# Patient Record
Sex: MALE | Race: BLACK OR AFRICAN AMERICAN | Employment: OTHER
[De-identification: names, ages, dates, MRNs, and addresses within clinical notes are randomized per-mention and may not be internally consistent; named-entity substitution may affect disease eponyms.]

---

## 2017-01-11 ENCOUNTER — OFFICE VISIT (OUTPATIENT)
Dept: INTERNAL MEDICINE | Facility: CLINIC | Age: 62
End: 2017-01-11

## 2017-01-11 VITALS
OXYGEN SATURATION: 99 % | SYSTOLIC BLOOD PRESSURE: 133 MMHG | HEIGHT: 70 IN | BODY MASS INDEX: 25.97 KG/M2 | WEIGHT: 181.4 LBS | DIASTOLIC BLOOD PRESSURE: 73 MMHG | RESPIRATION RATE: 12 BRPM | HEART RATE: 87 BPM

## 2017-01-11 DIAGNOSIS — I10 ESSENTIAL HYPERTENSION: ICD-10-CM

## 2017-01-11 DIAGNOSIS — I63.9 OCCIPITAL CEREBRAL INFARCTION (HCC): ICD-10-CM

## 2017-01-11 DIAGNOSIS — H54.2X22 VISUAL IMPAIRMENT SEVERE BILATERALLY: ICD-10-CM

## 2017-01-11 DIAGNOSIS — K92.0 GASTROINTESTINAL HEMORRHAGE WITH HEMATEMESIS: ICD-10-CM

## 2017-01-11 PROCEDURE — 99214 OFFICE O/P EST MOD 30 MIN: CPT | Performed by: INTERNAL MEDICINE

## 2017-01-11 ASSESSMENT — ENCOUNTER SYMPTOMS: BLURRED VISION: 1

## 2017-01-11 ASSESSMENT — PATIENT HEALTH QUESTIONNAIRE - PHQ9
2. FEELING DOWN, DEPRESSED OR HOPELESS: 1
1. LITTLE INTEREST OR PLEASURE IN DOING THINGS: 1
SUM OF ALL RESPONSES TO PHQ9 QUESTIONS 1 & 2: 2
SUM OF ALL RESPONSES TO PHQ QUESTIONS 1-9: 2

## 2017-01-14 PROBLEM — R94.31 PROLONGED Q-T INTERVAL ON ECG: Status: ACTIVE | Noted: 2017-01-14

## 2017-01-24 ENCOUNTER — TELEPHONE (OUTPATIENT)
Dept: INTERNAL MEDICINE | Facility: CLINIC | Age: 62
End: 2017-01-24

## 2017-02-03 ENCOUNTER — OFFICE VISIT (OUTPATIENT)
Dept: INTERNAL MEDICINE | Facility: CLINIC | Age: 62
End: 2017-02-03

## 2017-02-03 VITALS
HEART RATE: 84 BPM | BODY MASS INDEX: 25.8 KG/M2 | OXYGEN SATURATION: 94 % | RESPIRATION RATE: 12 BRPM | DIASTOLIC BLOOD PRESSURE: 63 MMHG | SYSTOLIC BLOOD PRESSURE: 106 MMHG | WEIGHT: 179.8 LBS

## 2017-02-03 DIAGNOSIS — K92.2 GASTROINTESTINAL HEMORRHAGE, UNSPECIFIED GASTROINTESTINAL HEMORRHAGE TYPE: Primary | ICD-10-CM

## 2017-02-03 DIAGNOSIS — D62 ACUTE BLOOD LOSS ANEMIA: ICD-10-CM

## 2017-02-03 PROCEDURE — G8484 FLU IMMUNIZE NO ADMIN: HCPCS | Performed by: INTERNAL MEDICINE

## 2017-02-03 PROCEDURE — G8598 ASA/ANTIPLAT THER USED: HCPCS | Performed by: INTERNAL MEDICINE

## 2017-02-03 PROCEDURE — G8427 DOCREV CUR MEDS BY ELIG CLIN: HCPCS | Performed by: INTERNAL MEDICINE

## 2017-02-03 PROCEDURE — 1111F DSCHRG MED/CURRENT MED MERGE: CPT | Performed by: INTERNAL MEDICINE

## 2017-02-03 PROCEDURE — 99214 OFFICE O/P EST MOD 30 MIN: CPT | Performed by: INTERNAL MEDICINE

## 2017-02-03 PROCEDURE — 4004F PT TOBACCO SCREEN RCVD TLK: CPT | Performed by: INTERNAL MEDICINE

## 2017-02-03 PROCEDURE — G8419 CALC BMI OUT NRM PARAM NOF/U: HCPCS | Performed by: INTERNAL MEDICINE

## 2017-02-03 PROCEDURE — 3017F COLORECTAL CA SCREEN DOC REV: CPT | Performed by: INTERNAL MEDICINE

## 2017-02-03 ASSESSMENT — PATIENT HEALTH QUESTIONNAIRE - PHQ9
SUM OF ALL RESPONSES TO PHQ9 QUESTIONS 1 & 2: 0
SUM OF ALL RESPONSES TO PHQ QUESTIONS 1-9: 0
2. FEELING DOWN, DEPRESSED OR HOPELESS: 0
1. LITTLE INTEREST OR PLEASURE IN DOING THINGS: 0

## 2017-02-03 ASSESSMENT — ENCOUNTER SYMPTOMS
ABDOMINAL PAIN: 0
CHOKING: 1

## 2017-02-06 ENCOUNTER — TELEPHONE (OUTPATIENT)
Dept: INTERNAL MEDICINE | Facility: CLINIC | Age: 62
End: 2017-02-06

## 2017-02-06 DIAGNOSIS — D50.0 BLOOD LOSS ANEMIA: Primary | ICD-10-CM

## 2017-02-14 ENCOUNTER — HOSPITAL ENCOUNTER (OUTPATIENT)
Age: 62
Setting detail: SPECIMEN
Discharge: HOME | End: 2017-02-14
Payer: COMMERCIAL

## 2017-02-14 LAB
HCT VFR BLD CALC: 24.8 % (ref 41–53)
HEMOGLOBIN: 7.5 G/DL (ref 13.5–17.5)
MCH RBC QN AUTO: 28.1 PG (ref 26–34)
MCHC RBC AUTO-ENTMCNC: 30.3 G/DL (ref 31–37)
MCV RBC AUTO: 92.8 FL (ref 80–100)
PDW BLD-RTO: 17.3 % (ref 12.5–15.4)
PLATELET # BLD: 316 K/UL (ref 140–450)
PMV BLD AUTO: 8.8 FL (ref 6–12)
RBC # BLD: 2.68 M/UL (ref 4.5–5.9)
WBC # BLD: 6.4 K/UL (ref 3.5–11)

## 2017-02-21 ENCOUNTER — HOSPITAL ENCOUNTER (OUTPATIENT)
Age: 62
Setting detail: SPECIMEN
Discharge: HOME | End: 2017-02-21
Payer: COMMERCIAL

## 2017-02-22 LAB
HCT VFR BLD CALC: 28 % (ref 41–53)
HEMOGLOBIN: 8.4 G/DL (ref 13.5–17.5)
MCH RBC QN AUTO: 27.6 PG (ref 26–34)
MCHC RBC AUTO-ENTMCNC: 30.1 G/DL (ref 31–37)
MCV RBC AUTO: 91.8 FL (ref 80–100)
PDW BLD-RTO: 18.1 % (ref 12.5–15.4)
PLATELET # BLD: 357 K/UL (ref 140–450)
PMV BLD AUTO: 8.6 FL (ref 6–12)
RBC # BLD: 3.05 M/UL (ref 4.5–5.9)
WBC # BLD: 5.9 K/UL (ref 3.5–11)

## 2017-03-02 ENCOUNTER — HOSPITAL ENCOUNTER (OUTPATIENT)
Age: 62
Setting detail: SPECIMEN
Discharge: HOME | End: 2017-03-02
Payer: COMMERCIAL

## 2017-03-02 LAB
HCT VFR BLD CALC: 32.4 % (ref 41–53)
HEMOGLOBIN: 9.9 G/DL (ref 13.5–17.5)
MCH RBC QN AUTO: 27.1 PG (ref 26–34)
MCHC RBC AUTO-ENTMCNC: 30.7 G/DL (ref 31–37)
MCV RBC AUTO: 88.1 FL (ref 80–100)
PDW BLD-RTO: 16.4 % (ref 12.5–15.4)
PLATELET # BLD: 308 K/UL (ref 140–450)
PMV BLD AUTO: 8.8 FL (ref 6–12)
RBC # BLD: 3.68 M/UL (ref 4.5–5.9)
WBC # BLD: 5.1 K/UL (ref 3.5–11)

## 2017-03-07 ENCOUNTER — HOSPITAL ENCOUNTER (OUTPATIENT)
Age: 62
Setting detail: SPECIMEN
Discharge: HOME | End: 2017-03-07
Payer: COMMERCIAL

## 2017-03-07 LAB
HCT VFR BLD CALC: 33.5 % (ref 41–53)
HEMOGLOBIN: 10.1 G/DL (ref 13.5–17.5)
MCH RBC QN AUTO: 26.8 PG (ref 26–34)
MCHC RBC AUTO-ENTMCNC: 30.1 G/DL (ref 31–37)
MCV RBC AUTO: 88.8 FL (ref 80–100)
PDW BLD-RTO: 17.2 % (ref 12.5–15.4)
PLATELET # BLD: 309 K/UL (ref 140–450)
PMV BLD AUTO: 8.9 FL (ref 6–12)
RBC # BLD: 3.78 M/UL (ref 4.5–5.9)
WBC # BLD: 5.5 K/UL (ref 3.5–11)

## 2017-03-14 ENCOUNTER — HOSPITAL ENCOUNTER (OUTPATIENT)
Age: 62
Setting detail: SPECIMEN
Discharge: HOME | End: 2017-03-14
Payer: COMMERCIAL

## 2017-03-14 LAB
HCT VFR BLD CALC: 32 % (ref 41–53)
HEMOGLOBIN: 9.9 G/DL (ref 13.5–17.5)
MCH RBC QN AUTO: 27.3 PG (ref 26–34)
MCHC RBC AUTO-ENTMCNC: 30.8 G/DL (ref 31–37)
MCV RBC AUTO: 88.5 FL (ref 80–100)
PDW BLD-RTO: 18.6 % (ref 12.5–15.4)
PLATELET # BLD: 260 K/UL (ref 140–450)
PMV BLD AUTO: 9.3 FL (ref 6–12)
RBC # BLD: 3.61 M/UL (ref 4.5–5.9)
WBC # BLD: 8.5 K/UL (ref 3.5–11)

## 2017-03-14 RX ORDER — OMEPRAZOLE 40 MG/1
40 CAPSULE, DELAYED RELEASE ORAL DAILY
Qty: 60 CAPSULE | Refills: 0 | Status: SHIPPED | OUTPATIENT
Start: 2017-03-14 | End: 2017-04-05 | Stop reason: SDUPTHER

## 2017-03-14 RX ORDER — ONDANSETRON HYDROCHLORIDE 8 MG/1
8 TABLET, FILM COATED ORAL EVERY 8 HOURS PRN
COMMUNITY
End: 2017-03-14 | Stop reason: SDUPTHER

## 2017-03-14 RX ORDER — ATORVASTATIN CALCIUM 80 MG/1
TABLET, FILM COATED ORAL
Qty: 30 TABLET | Refills: 3 | Status: SHIPPED | OUTPATIENT
Start: 2017-03-14 | End: 2017-04-05 | Stop reason: SDUPTHER

## 2017-03-14 RX ORDER — ONDANSETRON HYDROCHLORIDE 8 MG/1
8 TABLET, FILM COATED ORAL EVERY 8 HOURS PRN
Qty: 90 TABLET | Refills: 2 | Status: SHIPPED | OUTPATIENT
Start: 2017-03-14 | End: 2017-04-05 | Stop reason: SDUPTHER

## 2017-03-24 ENCOUNTER — TELEPHONE (OUTPATIENT)
Dept: INTERNAL MEDICINE | Age: 62
End: 2017-03-24

## 2017-03-25 ENCOUNTER — HOSPITAL ENCOUNTER (OUTPATIENT)
Age: 62
Setting detail: SPECIMEN
Discharge: HOME | End: 2017-03-25
Payer: COMMERCIAL

## 2017-03-25 LAB
ABSOLUTE EOS #: 0.2 K/UL (ref 0–0.4)
ABSOLUTE LYMPH #: 1.06 K/UL (ref 1–4.8)
ABSOLUTE MONO #: 0.2 K/UL (ref 0.1–0.8)
BASOPHILS # BLD: 0 % (ref 0–2)
BASOPHILS ABSOLUTE: 0 K/UL (ref 0–0.2)
DIFFERENTIAL TYPE: ABNORMAL
EOSINOPHILS RELATIVE PERCENT: 3 % (ref 1–4)
HCT VFR BLD CALC: 23.3 % (ref 41–53)
HEMOGLOBIN: 7.3 G/DL (ref 13.5–17.5)
LYMPHOCYTES # BLD: 16 % (ref 24–44)
MCH RBC QN AUTO: 28.4 PG (ref 26–34)
MCHC RBC AUTO-ENTMCNC: 31.3 G/DL (ref 31–37)
MCV RBC AUTO: 90.8 FL (ref 80–100)
MONOCYTES # BLD: 3 % (ref 1–7)
MORPHOLOGY: ABNORMAL
PDW BLD-RTO: 22.1 % (ref 12.5–15.4)
PLATELET # BLD: 322 K/UL (ref 140–450)
PLATELET ESTIMATE: ABNORMAL
PMV BLD AUTO: 7.9 FL (ref 6–12)
RBC # BLD: 2.57 M/UL (ref 4.5–5.9)
RBC # BLD: ABNORMAL 10*6/UL
SEG NEUTROPHILS: 78 % (ref 36–66)
SEGMENTED NEUTROPHILS ABSOLUTE COUNT: 5.14 K/UL (ref 1.8–7.7)
WBC # BLD: 6.6 K/UL (ref 3.5–11)
WBC # BLD: ABNORMAL 10*3/UL

## 2017-03-29 ENCOUNTER — HOSPITAL ENCOUNTER (OUTPATIENT)
Age: 62
Setting detail: SPECIMEN
Discharge: HOME | End: 2017-03-29
Payer: COMMERCIAL

## 2017-03-29 LAB
HCT VFR BLD CALC: 25.7 % (ref 41–53)
HEMOGLOBIN: 7.9 G/DL (ref 13.5–17.5)
MCH RBC QN AUTO: 28.2 PG (ref 26–34)
MCHC RBC AUTO-ENTMCNC: 30.6 G/DL (ref 31–37)
MCV RBC AUTO: 92.1 FL (ref 80–100)
PDW BLD-RTO: 21.9 % (ref 12.5–15.4)
PLATELET # BLD: 358 K/UL (ref 140–450)
PMV BLD AUTO: 8.3 FL (ref 6–12)
RBC # BLD: 2.79 M/UL (ref 4.5–5.9)
WBC # BLD: 6.1 K/UL (ref 3.5–11)

## 2017-04-05 RX ORDER — ONDANSETRON HYDROCHLORIDE 8 MG/1
8 TABLET, FILM COATED ORAL EVERY 8 HOURS PRN
Qty: 90 TABLET | Refills: 3 | Status: SHIPPED | OUTPATIENT
Start: 2017-04-05 | End: 2019-01-23

## 2017-04-05 RX ORDER — ATORVASTATIN CALCIUM 80 MG/1
TABLET, FILM COATED ORAL
Qty: 30 TABLET | Refills: 3 | Status: SHIPPED | OUTPATIENT
Start: 2017-04-05 | End: 2017-08-31 | Stop reason: SDUPTHER

## 2017-04-05 RX ORDER — OMEPRAZOLE 40 MG/1
40 CAPSULE, DELAYED RELEASE ORAL DAILY
Qty: 60 CAPSULE | Refills: 3 | Status: ON HOLD | OUTPATIENT
Start: 2017-04-05 | End: 2017-12-23 | Stop reason: HOSPADM

## 2017-04-05 RX ORDER — PANTOPRAZOLE SODIUM 40 MG/1
40 TABLET, DELAYED RELEASE ORAL 2 TIMES DAILY
Qty: 30 TABLET | Refills: 3 | Status: ON HOLD | OUTPATIENT
Start: 2017-04-05 | End: 2017-12-23

## 2017-04-19 ENCOUNTER — HOSPITAL ENCOUNTER (OUTPATIENT)
Age: 62
Setting detail: SPECIMEN
Discharge: HOME | End: 2017-04-19
Payer: COMMERCIAL

## 2017-04-19 DIAGNOSIS — D62 ACUTE BLOOD LOSS ANEMIA: ICD-10-CM

## 2017-04-19 DIAGNOSIS — K92.2 GASTROINTESTINAL HEMORRHAGE, UNSPECIFIED GASTROINTESTINAL HEMORRHAGE TYPE: ICD-10-CM

## 2017-04-19 LAB
HCT VFR BLD CALC: 26.6 % (ref 41–53)
HEMOGLOBIN: 7.7 G/DL (ref 13.5–17.5)
MCH RBC QN AUTO: 26.5 PG (ref 26–34)
MCHC RBC AUTO-ENTMCNC: 29 G/DL (ref 31–37)
MCV RBC AUTO: 91.5 FL (ref 80–100)
PDW BLD-RTO: 18.9 % (ref 12.5–15.4)
PLATELET # BLD: 346 K/UL (ref 140–450)
PMV BLD AUTO: 9 FL (ref 6–12)
RBC # BLD: 2.9 M/UL (ref 4.5–5.9)
WBC # BLD: 5.2 K/UL (ref 3.5–11)

## 2017-04-20 ENCOUNTER — TELEPHONE (OUTPATIENT)
Dept: INTERNAL MEDICINE | Age: 62
End: 2017-04-20

## 2017-04-26 ENCOUNTER — HOSPITAL ENCOUNTER (OUTPATIENT)
Age: 62
Setting detail: SPECIMEN
Discharge: HOME | End: 2017-04-26
Payer: COMMERCIAL

## 2017-04-26 LAB
HCT VFR BLD CALC: 30.5 % (ref 41–53)
HEMOGLOBIN: 9 G/DL (ref 13.5–17.5)
MCH RBC QN AUTO: 26.4 PG (ref 26–34)
MCHC RBC AUTO-ENTMCNC: 29.4 G/DL (ref 31–37)
MCV RBC AUTO: 89.7 FL (ref 80–100)
PDW BLD-RTO: 17.8 % (ref 12.5–15.4)
PLATELET # BLD: 322 K/UL (ref 140–450)
PMV BLD AUTO: 9.1 FL (ref 6–12)
RBC # BLD: 3.4 M/UL (ref 4.5–5.9)
WBC # BLD: 6.2 K/UL (ref 3.5–11)

## 2017-05-03 ENCOUNTER — HOSPITAL ENCOUNTER (OUTPATIENT)
Age: 62
Setting detail: SPECIMEN
Discharge: HOME | End: 2017-05-03
Payer: COMMERCIAL

## 2017-05-03 LAB
HCT VFR BLD CALC: 35.3 % (ref 41–53)
HEMOGLOBIN: 10.5 G/DL (ref 13.5–17.5)
MCH RBC QN AUTO: 26.7 PG (ref 26–34)
MCHC RBC AUTO-ENTMCNC: 29.7 G/DL (ref 31–37)
MCV RBC AUTO: 90.1 FL (ref 80–100)
PDW BLD-RTO: 19.4 % (ref 12.5–15.4)
PLATELET # BLD: 279 K/UL (ref 140–450)
PMV BLD AUTO: 9.4 FL (ref 6–12)
RBC # BLD: 3.92 M/UL (ref 4.5–5.9)
WBC # BLD: 5.4 K/UL (ref 3.5–11)

## 2017-05-08 ENCOUNTER — APPOINTMENT (OUTPATIENT)
Dept: GENERAL RADIOLOGY | Age: 62
End: 2017-05-08
Payer: COMMERCIAL

## 2017-05-08 ENCOUNTER — HOSPITAL ENCOUNTER (EMERGENCY)
Age: 62
Discharge: HOME | End: 2017-05-08
Attending: EMERGENCY MEDICINE
Payer: COMMERCIAL

## 2017-05-08 VITALS
SYSTOLIC BLOOD PRESSURE: 132 MMHG | WEIGHT: 195.6 LBS | TEMPERATURE: 99.1 F | OXYGEN SATURATION: 99 % | BODY MASS INDEX: 28.97 KG/M2 | HEIGHT: 69 IN | HEART RATE: 88 BPM | RESPIRATION RATE: 16 BRPM | DIASTOLIC BLOOD PRESSURE: 73 MMHG

## 2017-05-08 DIAGNOSIS — R07.89 RIGHT-SIDED CHEST WALL PAIN: Primary | ICD-10-CM

## 2017-05-08 LAB
ABSOLUTE EOS #: 0.1 K/UL (ref 0–0.4)
ABSOLUTE LYMPH #: 1.1 K/UL (ref 1–4.8)
ABSOLUTE MONO #: 0.7 K/UL (ref 0.2–0.8)
ANION GAP SERPL CALCULATED.3IONS-SCNC: 12 MMOL/L (ref 9–17)
BASOPHILS # BLD: 0 %
BASOPHILS ABSOLUTE: 0 K/UL (ref 0–0.2)
BUN BLDV-MCNC: 12 MG/DL (ref 8–23)
BUN/CREAT BLD: 11 (ref 9–20)
CALCIUM SERPL-MCNC: 8.8 MG/DL (ref 8.6–10.4)
CHLORIDE BLD-SCNC: 102 MMOL/L (ref 98–107)
CO2: 27 MMOL/L (ref 20–31)
CREAT SERPL-MCNC: 1.13 MG/DL (ref 0.7–1.2)
D-DIMER QUANTITATIVE: 0.67 MG/L FEU
DIFFERENTIAL TYPE: ABNORMAL
EOSINOPHILS RELATIVE PERCENT: 2 %
GFR AFRICAN AMERICAN: >60 ML/MIN
GFR NON-AFRICAN AMERICAN: >60 ML/MIN
GFR SERPL CREATININE-BSD FRML MDRD: ABNORMAL ML/MIN/{1.73_M2}
GFR SERPL CREATININE-BSD FRML MDRD: ABNORMAL ML/MIN/{1.73_M2}
GLUCOSE BLD-MCNC: 104 MG/DL (ref 70–99)
HCT VFR BLD CALC: 34.7 % (ref 41–53)
HEMOGLOBIN: 10.7 G/DL (ref 13.5–17.5)
LYMPHOCYTES # BLD: 16 %
MCH RBC QN AUTO: 26.9 PG (ref 26–34)
MCHC RBC AUTO-ENTMCNC: 30.8 G/DL (ref 31–37)
MCV RBC AUTO: 87.5 FL (ref 80–100)
MONOCYTES # BLD: 10 %
PDW BLD-RTO: 18.7 % (ref 11.5–14.5)
PLATELET # BLD: 251 K/UL (ref 130–400)
PLATELET ESTIMATE: ABNORMAL
PMV BLD AUTO: 8.6 FL (ref 6–12)
POTASSIUM SERPL-SCNC: 4.1 MMOL/L (ref 3.7–5.3)
RBC # BLD: 3.97 M/UL (ref 4.5–5.9)
RBC # BLD: ABNORMAL 10*6/UL
SEG NEUTROPHILS: 72 %
SEGMENTED NEUTROPHILS ABSOLUTE COUNT: 5 K/UL (ref 1.8–7.7)
SODIUM BLD-SCNC: 141 MMOL/L (ref 135–144)
WBC # BLD: 7 K/UL (ref 3.5–11)
WBC # BLD: ABNORMAL 10*3/UL

## 2017-05-08 PROCEDURE — 71101 X-RAY EXAM UNILAT RIBS/CHEST: CPT

## 2017-05-08 PROCEDURE — 85379 FIBRIN DEGRADATION QUANT: CPT

## 2017-05-08 PROCEDURE — 36415 COLL VENOUS BLD VENIPUNCTURE: CPT

## 2017-05-08 PROCEDURE — 99283 EMERGENCY DEPT VISIT LOW MDM: CPT

## 2017-05-08 PROCEDURE — 80048 BASIC METABOLIC PNL TOTAL CA: CPT

## 2017-05-08 PROCEDURE — 85025 COMPLETE CBC W/AUTO DIFF WBC: CPT

## 2017-05-08 RX ORDER — HYDROCODONE BITARTRATE AND ACETAMINOPHEN 5; 325 MG/1; MG/1
1 TABLET ORAL EVERY 6 HOURS PRN
Qty: 20 TABLET | Refills: 0 | Status: SHIPPED | OUTPATIENT
Start: 2017-05-08 | End: 2017-05-15

## 2017-05-08 RX ORDER — CYCLOBENZAPRINE HCL 10 MG
10 TABLET ORAL 3 TIMES DAILY PRN
Qty: 30 TABLET | Refills: 0 | Status: SHIPPED | OUTPATIENT
Start: 2017-05-08 | End: 2017-05-18

## 2017-05-08 ASSESSMENT — PAIN SCALES - GENERAL: PAINLEVEL_OUTOF10: 10

## 2017-05-09 ASSESSMENT — ENCOUNTER SYMPTOMS
ABDOMINAL PAIN: 0
RHINORRHEA: 0
COUGH: 0
WHEEZING: 0
DIARRHEA: 0
SORE THROAT: 0
SINUS PRESSURE: 0
SHORTNESS OF BREATH: 0
COLOR CHANGE: 0
NAUSEA: 0
CONSTIPATION: 0
VOMITING: 0

## 2017-09-01 RX ORDER — ATORVASTATIN CALCIUM 80 MG/1
TABLET, FILM COATED ORAL
Qty: 30 TABLET | Refills: 3 | Status: ON HOLD | OUTPATIENT
Start: 2017-09-01 | End: 2017-12-23

## 2017-09-01 RX ORDER — FERROUS SULFATE 325(65) MG
325 TABLET ORAL DAILY
Qty: 30 TABLET | Refills: 3 | Status: ON HOLD | OUTPATIENT
Start: 2017-09-01 | End: 2017-12-23

## 2017-12-21 ENCOUNTER — HOSPITAL ENCOUNTER (INPATIENT)
Age: 62
LOS: 2 days | Discharge: HOME | DRG: 378 | End: 2017-12-23
Attending: EMERGENCY MEDICINE | Admitting: INTERNAL MEDICINE
Payer: COMMERCIAL

## 2017-12-21 ENCOUNTER — APPOINTMENT (OUTPATIENT)
Dept: GENERAL RADIOLOGY | Age: 62
DRG: 378 | End: 2017-12-21
Payer: COMMERCIAL

## 2017-12-21 ENCOUNTER — ANESTHESIA EVENT (OUTPATIENT)
Dept: OPERATING ROOM | Age: 62
DRG: 378 | End: 2017-12-21
Payer: COMMERCIAL

## 2017-12-21 DIAGNOSIS — K29.01 ACUTE GASTRITIS WITH HEMORRHAGE, UNSPECIFIED GASTRITIS TYPE: ICD-10-CM

## 2017-12-21 DIAGNOSIS — D64.9 ANEMIA, UNSPECIFIED TYPE: ICD-10-CM

## 2017-12-21 DIAGNOSIS — K25.4 GASTROINTESTINAL HEMORRHAGE ASSOCIATED WITH GASTRIC ULCER: ICD-10-CM

## 2017-12-21 DIAGNOSIS — K92.2 UPPER GI BLEED: Primary | ICD-10-CM

## 2017-12-21 LAB
ABSOLUTE EOS #: 0.1 K/UL (ref 0–0.4)
ABSOLUTE IMMATURE GRANULOCYTE: ABNORMAL K/UL (ref 0–0.3)
ABSOLUTE LYMPH #: 1 K/UL (ref 1–4.8)
ABSOLUTE MONO #: 0.9 K/UL (ref 0.2–0.8)
ACETAMINOPHEN LEVEL: <10 UG/ML (ref 10–30)
ALBUMIN SERPL-MCNC: 4.1 G/DL (ref 3.5–5.2)
ALBUMIN/GLOBULIN RATIO: NORMAL (ref 1–2.5)
ALP BLD-CCNC: 78 U/L (ref 40–129)
ALT SERPL-CCNC: 12 U/L (ref 5–41)
ANION GAP SERPL CALCULATED.3IONS-SCNC: 13 MMOL/L (ref 9–17)
AST SERPL-CCNC: 24 U/L
BASOPHILS # BLD: 0 % (ref 0–2)
BASOPHILS ABSOLUTE: 0 K/UL (ref 0–0.2)
BILIRUB SERPL-MCNC: 0.34 MG/DL (ref 0.3–1.2)
BILIRUBIN DIRECT: 0.1 MG/DL
BILIRUBIN, INDIRECT: 0.24 MG/DL (ref 0–1)
BUN BLDV-MCNC: 15 MG/DL (ref 8–23)
BUN/CREAT BLD: 10 (ref 9–20)
CALCIUM SERPL-MCNC: 8.6 MG/DL (ref 8.6–10.4)
CHLORIDE BLD-SCNC: 98 MMOL/L (ref 98–107)
CO2: 24 MMOL/L (ref 20–31)
CREAT SERPL-MCNC: 1.51 MG/DL (ref 0.7–1.2)
DIFFERENTIAL TYPE: ABNORMAL
EOSINOPHILS RELATIVE PERCENT: 1 % (ref 1–4)
ETHANOL PERCENT: <0.01 %
ETHANOL: <10 MG/DL
GFR AFRICAN AMERICAN: 57 ML/MIN
GFR NON-AFRICAN AMERICAN: 47 ML/MIN
GFR SERPL CREATININE-BSD FRML MDRD: ABNORMAL ML/MIN/{1.73_M2}
GFR SERPL CREATININE-BSD FRML MDRD: ABNORMAL ML/MIN/{1.73_M2}
GLOBULIN: NORMAL G/DL (ref 1.5–3.8)
GLUCOSE BLD-MCNC: 140 MG/DL (ref 70–99)
HCT VFR BLD CALC: 20.9 % (ref 41–53)
HEMOGLOBIN: 6.3 G/DL (ref 13.5–17.5)
HEMOGLOBIN: 7.4 G/DL (ref 13.5–17.5)
HEMOGLOBIN: 7.6 G/DL (ref 13.5–17.5)
IMMATURE GRANULOCYTES: ABNORMAL %
INR BLD: 1
LACTIC ACID: 1.3 MMOL/L (ref 0.5–2.2)
LIPASE: 15 U/L (ref 13–60)
LYMPHOCYTES # BLD: 10 % (ref 24–44)
MAGNESIUM: 2.3 MG/DL (ref 1.6–2.6)
MCH RBC QN AUTO: 25.9 PG (ref 26–34)
MCHC RBC AUTO-ENTMCNC: 30.2 G/DL (ref 31–37)
MCV RBC AUTO: 85.7 FL (ref 80–100)
MONOCYTES # BLD: 9 % (ref 1–7)
MORPHOLOGY: ABNORMAL
PARTIAL THROMBOPLASTIN TIME: 24 SEC (ref 23–31)
PDW BLD-RTO: 20.4 % (ref 11.5–14.5)
PLATELET # BLD: 399 K/UL (ref 130–400)
PLATELET ESTIMATE: ABNORMAL
PMV BLD AUTO: ABNORMAL FL (ref 6–12)
POTASSIUM SERPL-SCNC: 3.5 MMOL/L (ref 3.7–5.3)
PROTHROMBIN TIME: 10.6 SEC (ref 9.7–11.6)
RBC # BLD: 2.43 M/UL (ref 4.5–5.9)
RBC # BLD: ABNORMAL 10*6/UL
SALICYLATE LEVEL: <1 MG/DL (ref 3–10)
SEG NEUTROPHILS: 80 % (ref 36–66)
SEGMENTED NEUTROPHILS ABSOLUTE COUNT: 8 K/UL (ref 1.8–7.7)
SODIUM BLD-SCNC: 135 MMOL/L (ref 135–144)
TOTAL PROTEIN: 7.2 G/DL (ref 6.4–8.3)
TOXIC TRICYCLIC SC,BLOOD: NEGATIVE
WBC # BLD: 10 K/UL (ref 3.5–11)
WBC # BLD: ABNORMAL 10*3/UL

## 2017-12-21 PROCEDURE — 36430 TRANSFUSION BLD/BLD COMPNT: CPT

## 2017-12-21 PROCEDURE — 83690 ASSAY OF LIPASE: CPT

## 2017-12-21 PROCEDURE — 74022 RADEX COMPL AQT ABD SERIES: CPT

## 2017-12-21 PROCEDURE — 6360000002 HC RX W HCPCS: Performed by: NURSE PRACTITIONER

## 2017-12-21 PROCEDURE — 85018 HEMOGLOBIN: CPT

## 2017-12-21 PROCEDURE — 86901 BLOOD TYPING SEROLOGIC RH(D): CPT

## 2017-12-21 PROCEDURE — 86920 COMPATIBILITY TEST SPIN: CPT

## 2017-12-21 PROCEDURE — 2580000003 HC RX 258: Performed by: NURSE PRACTITIONER

## 2017-12-21 PROCEDURE — 1200000000 HC SEMI PRIVATE

## 2017-12-21 PROCEDURE — 6360000002 HC RX W HCPCS: Performed by: EMERGENCY MEDICINE

## 2017-12-21 PROCEDURE — 99285 EMERGENCY DEPT VISIT HI MDM: CPT

## 2017-12-21 PROCEDURE — 6370000000 HC RX 637 (ALT 250 FOR IP): Performed by: NURSE PRACTITIONER

## 2017-12-21 PROCEDURE — 85025 COMPLETE CBC W/AUTO DIFF WBC: CPT

## 2017-12-21 PROCEDURE — 36415 COLL VENOUS BLD VENIPUNCTURE: CPT

## 2017-12-21 PROCEDURE — 2580000003 HC RX 258: Performed by: INTERNAL MEDICINE

## 2017-12-21 PROCEDURE — 80307 DRUG TEST PRSMV CHEM ANLYZR: CPT

## 2017-12-21 PROCEDURE — 83735 ASSAY OF MAGNESIUM: CPT

## 2017-12-21 PROCEDURE — 83605 ASSAY OF LACTIC ACID: CPT

## 2017-12-21 PROCEDURE — 6370000000 HC RX 637 (ALT 250 FOR IP): Performed by: INTERNAL MEDICINE

## 2017-12-21 PROCEDURE — 99254 IP/OBS CNSLTJ NEW/EST MOD 60: CPT | Performed by: INTERNAL MEDICINE

## 2017-12-21 PROCEDURE — C9113 INJ PANTOPRAZOLE SODIUM, VIA: HCPCS | Performed by: NURSE PRACTITIONER

## 2017-12-21 PROCEDURE — 96375 TX/PRO/DX INJ NEW DRUG ADDON: CPT

## 2017-12-21 PROCEDURE — 85730 THROMBOPLASTIN TIME PARTIAL: CPT

## 2017-12-21 PROCEDURE — 85610 PROTHROMBIN TIME: CPT

## 2017-12-21 PROCEDURE — G0480 DRUG TEST DEF 1-7 CLASSES: HCPCS

## 2017-12-21 PROCEDURE — P9016 RBC LEUKOCYTES REDUCED: HCPCS

## 2017-12-21 PROCEDURE — 99222 1ST HOSP IP/OBS MODERATE 55: CPT | Performed by: INTERNAL MEDICINE

## 2017-12-21 PROCEDURE — C9113 INJ PANTOPRAZOLE SODIUM, VIA: HCPCS | Performed by: EMERGENCY MEDICINE

## 2017-12-21 PROCEDURE — 86850 RBC ANTIBODY SCREEN: CPT

## 2017-12-21 PROCEDURE — 80048 BASIC METABOLIC PNL TOTAL CA: CPT

## 2017-12-21 PROCEDURE — 86900 BLOOD TYPING SEROLOGIC ABO: CPT

## 2017-12-21 PROCEDURE — 2580000003 HC RX 258: Performed by: EMERGENCY MEDICINE

## 2017-12-21 PROCEDURE — 80076 HEPATIC FUNCTION PANEL: CPT

## 2017-12-21 PROCEDURE — 96365 THER/PROPH/DIAG IV INF INIT: CPT

## 2017-12-21 RX ORDER — ONDANSETRON 2 MG/ML
4 INJECTION INTRAMUSCULAR; INTRAVENOUS ONCE
Status: COMPLETED | OUTPATIENT
Start: 2017-12-21 | End: 2017-12-21

## 2017-12-21 RX ORDER — ACETAMINOPHEN 325 MG/1
650 TABLET ORAL EVERY 4 HOURS PRN
Status: DISCONTINUED | OUTPATIENT
Start: 2017-12-21 | End: 2017-12-23 | Stop reason: HOSPADM

## 2017-12-21 RX ORDER — SODIUM CHLORIDE 9 MG/ML
INJECTION, SOLUTION INTRAVENOUS CONTINUOUS
Status: DISCONTINUED | OUTPATIENT
Start: 2017-12-21 | End: 2017-12-23 | Stop reason: HOSPADM

## 2017-12-21 RX ORDER — SODIUM CHLORIDE 9 MG/ML
INJECTION, SOLUTION INTRAVENOUS CONTINUOUS
Status: CANCELLED | OUTPATIENT
Start: 2017-12-21

## 2017-12-21 RX ORDER — SODIUM CHLORIDE 0.9 % (FLUSH) 0.9 %
10 SYRINGE (ML) INJECTION EVERY 12 HOURS SCHEDULED
Status: CANCELLED | OUTPATIENT
Start: 2017-12-21

## 2017-12-21 RX ORDER — SODIUM CHLORIDE 0.9 % (FLUSH) 0.9 %
10 SYRINGE (ML) INJECTION EVERY 12 HOURS SCHEDULED
Status: DISCONTINUED | OUTPATIENT
Start: 2017-12-21 | End: 2017-12-23 | Stop reason: HOSPADM

## 2017-12-21 RX ORDER — 0.9 % SODIUM CHLORIDE 0.9 %
1000 INTRAVENOUS SOLUTION INTRAVENOUS ONCE
Status: COMPLETED | OUTPATIENT
Start: 2017-12-21 | End: 2017-12-21

## 2017-12-21 RX ORDER — SODIUM CHLORIDE 0.9 % (FLUSH) 0.9 %
10 SYRINGE (ML) INJECTION PRN
Status: DISCONTINUED | OUTPATIENT
Start: 2017-12-21 | End: 2017-12-23 | Stop reason: HOSPADM

## 2017-12-21 RX ORDER — CYANOCOBALAMIN 1000 UG/ML
1000 INJECTION INTRAMUSCULAR; SUBCUTANEOUS ONCE
Status: COMPLETED | OUTPATIENT
Start: 2017-12-21 | End: 2017-12-21

## 2017-12-21 RX ORDER — LANOLIN ALCOHOL/MO/W.PET/CERES
1000 CREAM (GRAM) TOPICAL DAILY
Status: DISCONTINUED | OUTPATIENT
Start: 2017-12-21 | End: 2017-12-23 | Stop reason: HOSPADM

## 2017-12-21 RX ORDER — MORPHINE SULFATE 4 MG/ML
4 INJECTION, SOLUTION INTRAMUSCULAR; INTRAVENOUS
Status: DISCONTINUED | OUTPATIENT
Start: 2017-12-21 | End: 2017-12-23 | Stop reason: HOSPADM

## 2017-12-21 RX ORDER — LIDOCAINE HYDROCHLORIDE 10 MG/ML
1 INJECTION, SOLUTION EPIDURAL; INFILTRATION; INTRACAUDAL; PERINEURAL
Status: CANCELLED | OUTPATIENT
Start: 2017-12-21 | End: 2017-12-21

## 2017-12-21 RX ORDER — ATORVASTATIN CALCIUM 80 MG/1
80 TABLET, FILM COATED ORAL DAILY
Status: DISCONTINUED | OUTPATIENT
Start: 2017-12-21 | End: 2017-12-23 | Stop reason: HOSPADM

## 2017-12-21 RX ORDER — SODIUM CHLORIDE 0.9 % (FLUSH) 0.9 %
10 SYRINGE (ML) INJECTION PRN
Status: CANCELLED | OUTPATIENT
Start: 2017-12-21

## 2017-12-21 RX ORDER — LANOLIN ALCOHOL/MO/W.PET/CERES
325 CREAM (GRAM) TOPICAL DAILY
Status: DISCONTINUED | OUTPATIENT
Start: 2017-12-21 | End: 2017-12-23 | Stop reason: HOSPADM

## 2017-12-21 RX ORDER — ONDANSETRON 2 MG/ML
4 INJECTION INTRAMUSCULAR; INTRAVENOUS EVERY 6 HOURS PRN
Status: DISCONTINUED | OUTPATIENT
Start: 2017-12-21 | End: 2017-12-23 | Stop reason: HOSPADM

## 2017-12-21 RX ORDER — MORPHINE SULFATE 2 MG/ML
2 INJECTION, SOLUTION INTRAMUSCULAR; INTRAVENOUS
Status: DISCONTINUED | OUTPATIENT
Start: 2017-12-21 | End: 2017-12-23 | Stop reason: HOSPADM

## 2017-12-21 RX ORDER — 0.9 % SODIUM CHLORIDE 0.9 %
250 INTRAVENOUS SOLUTION INTRAVENOUS ONCE
Status: COMPLETED | OUTPATIENT
Start: 2017-12-21 | End: 2017-12-21

## 2017-12-21 RX ORDER — SODIUM CHLORIDE, SODIUM LACTATE, POTASSIUM CHLORIDE, CALCIUM CHLORIDE 600; 310; 30; 20 MG/100ML; MG/100ML; MG/100ML; MG/100ML
INJECTION, SOLUTION INTRAVENOUS CONTINUOUS
Status: CANCELLED | OUTPATIENT
Start: 2017-12-21

## 2017-12-21 RX ADMIN — METOPROLOL TARTRATE 12.5 MG: 25 TABLET ORAL at 20:33

## 2017-12-21 RX ADMIN — POLYETHYLENE GLYCOL-3350 AND ELECTROLYTES 4000 ML: 236; 6.74; 5.86; 2.97; 22.74 POWDER, FOR SOLUTION ORAL at 15:26

## 2017-12-21 RX ADMIN — SODIUM CHLORIDE 80 MG: 9 INJECTION, SOLUTION INTRAVENOUS at 03:11

## 2017-12-21 RX ADMIN — METOPROLOL TARTRATE 12.5 MG: 25 TABLET ORAL at 08:23

## 2017-12-21 RX ADMIN — CYANOCOBALAMIN 1000 MCG: 1000 INJECTION, SOLUTION INTRAMUSCULAR at 04:00

## 2017-12-21 RX ADMIN — SODIUM CHLORIDE 250 ML: 9 INJECTION, SOLUTION INTRAVENOUS at 06:15

## 2017-12-21 RX ADMIN — MAGNESIUM OXIDE TAB 400 MG (241.3 MG ELEMENTAL MG) 400 MG: 400 (241.3 MG) TAB at 08:17

## 2017-12-21 RX ADMIN — FERROUS SULFATE TAB EC 325 MG (65 MG FE EQUIVALENT) 325 MG: 325 (65 FE) TABLET DELAYED RESPONSE at 15:26

## 2017-12-21 RX ADMIN — SODIUM CHLORIDE 8 MG/HR: 9 INJECTION, SOLUTION INTRAVENOUS at 03:58

## 2017-12-21 RX ADMIN — ATORVASTATIN CALCIUM 80 MG: 80 TABLET, FILM COATED ORAL at 08:17

## 2017-12-21 RX ADMIN — SODIUM CHLORIDE 1000 ML: 9 INJECTION, SOLUTION INTRAVENOUS at 03:11

## 2017-12-21 RX ADMIN — SODIUM CHLORIDE 8 MG/HR: 9 INJECTION, SOLUTION INTRAVENOUS at 19:26

## 2017-12-21 RX ADMIN — Medication 10 ML: at 08:16

## 2017-12-21 RX ADMIN — SODIUM CHLORIDE: 9 INJECTION, SOLUTION INTRAVENOUS at 20:12

## 2017-12-21 RX ADMIN — SODIUM CHLORIDE 8 MG/HR: 9 INJECTION, SOLUTION INTRAVENOUS at 07:00

## 2017-12-21 RX ADMIN — ONDANSETRON 4 MG: 2 INJECTION INTRAMUSCULAR; INTRAVENOUS at 03:10

## 2017-12-21 RX ADMIN — CYANOCOBALAMIN TAB 1000 MCG 1000 MCG: 1000 TAB at 08:17

## 2017-12-21 RX ADMIN — SODIUM CHLORIDE: 9 INJECTION, SOLUTION INTRAVENOUS at 08:13

## 2017-12-21 ASSESSMENT — ENCOUNTER SYMPTOMS
BLURRED VISION: 0
SORE THROAT: 0
SHORTNESS OF BREATH: 0
EYE DISCHARGE: 0
WHEEZING: 0
COUGH: 0
NAUSEA: 1
VOMITING: 1
DIARRHEA: 0
CONSTIPATION: 0
ABDOMINAL PAIN: 1
RESPIRATORY NEGATIVE: 1
BACK PAIN: 0

## 2017-12-21 NOTE — ED PROVIDER NOTES
 MI (myocardial infarction)     Poor historian        SURGICAL HISTORY           Procedure Laterality Date    CARDIAC SURGERY      COLON SURGERY Right 12/15/15    COLONOSCOPY  07/25/15    HYPERPLASTIC AND TUBULAR ADENOMA     COLONOSCOPY  12/15/15    SERRATED ADENOMA    COLONOSCOPY  03-10-16    Severe diverticulosis , adenomatous polyp    ENDOSCOPY, COLON, DIAGNOSTIC  2015    egd    ENDOSCOPY, COLON, DIAGNOSTIC  2016    EGD WITH BIOPYS    HEMICOLECTOMY Right 12/15/15    Dr. Andres Clark ENDOSCOPY  16    UPPER GASTROINTESTINAL ENDOSCOPY  11/13/15    NORMAL SMALL BOWEL MUCOSA    UPPER GASTROINTESTINAL ENDOSCOPY  3/8/16    Small gastric ulcer less than 1 cm in the body of the stomach, no visible vessel, no blood staining the stomach, we injected 5 cc of 1/80930 epi around it Duodenal avm, cauterized using bicab gold probe, not actively bleeding. those two lesion could cause bleeding but the fact that bile is very clean with no blood old or fresh make that less likely the source of bleeding          FAMILY HISTORY     History reviewed. No pertinent family history. Family Status   Relation Status    Mother Alive    Father         SOCIAL HISTORY      reports that he has been smoking Cigarettes. He has a 30.00 pack-year smoking history. He has never used smokeless tobacco. He reports that he drinks alcohol. He reports that he does not use drugs. REVIEW OF SYSTEMS    (2-9 systems for level 4, 10 or more for level 5)     Review of Systems   Constitutional: Positive for appetite change. HENT: Negative. Respiratory: Negative. Cardiovascular: Negative. Gastrointestinal: Positive for abdominal pain, nausea and vomiting. Genitourinary: Negative. Musculoskeletal: Negative. Skin: Negative. Neurological: Negative. Hematological: Negative. Except as noted above the remainder of the review of systems was reviewed and negative. Potassium 3.5 (*)     GFR Non- 47 (*)     GFR  57 (*)     All other components within normal limits   CBC WITH AUTO DIFFERENTIAL - Abnormal; Notable for the following:     RBC 2.43 (*)     Hemoglobin 6.3 (*)     Hematocrit 20.9 (*)     MCH 25.9 (*)     MCHC 30.2 (*)     RDW 20.4 (*)     All other components within normal limits   TOX SCR, BLD, ED - Abnormal; Notable for the following:     Salicylate Lvl <1 (*)     Acetaminophen Level <10 (*)     All other components within normal limits   LIPASE   HEPATIC FUNCTION PANEL   PROTIME-INR   APTT   MAGNESIUM   LACTIC ACID   TYPE AND SCREEN   PREPARE RBC (CROSSMATCH)   Results for Killian Pozo (MRN [id number]) as of 12/21/2017 03:30   Ref.  Range 12/21/2017 02:49   Sodium Latest Ref Range: 135 - 144 mmol/L 135   Potassium Latest Ref Range: 3.7 - 5.3 mmol/L 3.5 (L)   Chloride Latest Ref Range: 98 - 107 mmol/L 98   CO2 Latest Ref Range: 20 - 31 mmol/L 24   BUN Latest Ref Range: 8 - 23 mg/dL 15   Creatinine Latest Ref Range: 0.70 - 1.20 mg/dL 1.51 (H)   Bun/Cre Ratio Latest Ref Range: 9 - 20  10   Anion Gap Latest Ref Range: 9 - 17 mmol/L 13   GFR Non- Latest Ref Range: >60 mL/min 47 (L)   GFR  Latest Ref Range: >60 mL/min 57 (L)   Magnesium Latest Ref Range: 1.6 - 2.6 mg/dL 2.3   Lactic Acid Latest Ref Range: 0.5 - 2.2 mmol/L 1.3   Glucose Latest Ref Range: 70 - 99 mg/dL 140 (H)   Calcium Latest Ref Range: 8.6 - 10.4 mg/dL 8.6   Albumin/Globulin Ratio Latest Ref Range: 1.0 - 2.5  NOT REPORTED   Total Protein Latest Ref Range: 6.4 - 8.3 g/dL 7.2   GFR Comment Unknown Pend   GFR Staging Unknown NOT REPORTED   Albumin Latest Ref Range: 3.5 - 5.2 g/dL 4.1   Globulin Latest Ref Range: 1.5 - 3.8 g/dL NOT REPORTED   Alk Phos Latest Ref Range: 40 - 129 U/L 78   ALT Latest Ref Range: 5 - 41 U/L 12   AST Latest Ref Range: <40 U/L 24   Bilirubin Latest Ref Range: 0.3 - 1.2 mg/dL 0.34   Bilirubin, Direct Latest Ref Range: <0.31 mg/dL 0.10   Bilirubin, Indirect Latest Ref Range: 0.00 - 1.00 mg/dL 0.24   Lipase Latest Ref Range: 13 - 60 U/L 15   Toxic Tricyclic Sc,Blood Latest Ref Range: NEG  Pending   Ethanol Latest Ref Range: <10 mg/dL <10   Ethanol percent Latest Units: % <0.010   Acetaminophen Level Latest Ref Range: 10 - 30 ug/mL <96 (L)   Salicylate Lvl Latest Ref Range: 3 - 10 mg/dL <1 (L)   Prothrombin Time Latest Ref Range: 9.7 - 11.6 sec 10.6   INR Unknown 1.0   PTT Latest Ref Range: 23 - 31 sec 24.0     Results for Zeb Severs (MRN [id number]) as of 12/21/2017 03:37   Ref. Range 12/21/2017 02:49   WBC Latest Ref Range: 3.5 - 11.0 k/uL 10.0   RBC Latest Ref Range: 4.5 - 5.9 m/uL 2.43 (L)   Hemoglobin Quant Latest Ref Range: 13.5 - 17.5 g/dL 6.3 (LL)   Hematocrit Latest Ref Range: 41 - 53 % 20.9 (L)   MCV Latest Ref Range: 80 - 100 fL 85.7   MCH Latest Ref Range: 26 - 34 pg 25.9 (L)   MCHC Latest Ref Range: 31 - 37 g/dL 30.2 (L)   MPV Latest Ref Range: 6.0 - 12.0 fL NOT REPORTED   RDW Latest Ref Range: 11.5 - 14.5 % 20.4 (H)   Platelet Count Latest Ref Range: 130 - 400 k/uL 399     All other labs were within normal range or not returned as of this dictation. EMERGENCY DEPARTMENT COURSE and DIFFERENTIAL DIAGNOSIS/MDM:   Vitals:    Vitals:    12/21/17 0242   BP: (!) 115/53   Pulse: 102   Resp: 14   Temp: 98.2 °F (36.8 °C)   TempSrc: Oral   SpO2: 100%   Weight: 186 lb 12.8 oz (84.7 kg)   Height: 5' 10\" (1.778 m)     Patient is evaluated. He did receive IV fluids and was started on IV proton pump inhibitor therapy. Laboratory studies and baseline imaging studies are requested. Previous records were reviewed. Patient does have a history of recurrent gastritis with associated GI bleed. His hemoglobin has fallen to 6. He is typed and crossed.   Care is discussed with internal medicine to facilitate admission for further supportive care, transfusion, additional care as indicated with regard to his exacerbation of

## 2017-12-21 NOTE — CONSULTS
GI Consult Note:    Name: Vahe Olvera  MRN: [id number]     Marthabersalvadoride: [de-identified]  Room: [address] [place] [address]    Admit Date: 12/21/2017  PCP: Johanna Knight MD    Physician Requesting Consult: Karly Sue DO     Reason for Consult:  Nausea, vomiting and hematemesis. And anemia. Chief Complaint:     Chief Complaint   Patient presents with    Hematemesis       History Obtained From:     patient, electronic medical record, nursing staff. History of Present Illness:      Vahe Olvera is a  60 y.o.  male who presents with Hematemesis      Symptoms:  Ricky , 69-year-old gentleman seen at [facility].  He stated) admission he had several episodes of nausea vomiting. He had hematemesis. Blood was moderate amount. He denies dry heaves and retching. He has vague upper abdominal discomfort. Has a burning sensation. Also has a good symptoms. Denies taking NSAIDs or anticoagulation therapy. Bowel movements are dark. This patient has extensive past medical history. This patient had several EGDs and colonoscopies done in the past starting from 2015. He is known to have gastric ulcers and gastritis. In 2015 he was found to have polypoid lesion near the ileocecal valve, and had right hemicolectomy done. There was no malignancy identified.   Past Medical History:     Past Medical History:   Diagnosis Date    Adenomatous colon polyp 2016    Arthritis     CVA (cerebral vascular accident) ([address] 03/2016    Diverticulosis of colon     History of colon polyps 2016    Hyperlipidemia     Hypertension     MI (myocardial infarction) 2011    Poor historian         Past Surgical History:     Past Surgical History:   Procedure Laterality Date    CARDIAC SURGERY      COLON SURGERY Right 12/15/15    COLONOSCOPY  07/25/15    HYPERPLASTIC AND TUBULAR ADENOMA     COLONOSCOPY  12/15/15    SERRATED ADENOMA    COLONOSCOPY  03-10-16    Severe diverticulosis , adenomatous polyp    Cardiovascular: Normal rate, regular rhythm, normal heart sounds and intact distal pulses. No murmur heard. Pulmonary/Chest: Effort normal and breath sounds normal. No respiratory distress. He has no wheezes. He has no rales. Abdominal: Soft. Bowel sounds are normal. He exhibits no distension, no ascites and no mass. There is no hepatomegaly. There is no tenderness. There is no guarding. No hernia. No peripheral signs of ch. Liver disease   Genitourinary: Rectum normal.   Musculoskeletal: He exhibits no edema. Lymphadenopathy:     He has no cervical adenopathy. Neurological: He is alert and oriented to person, place, and time. No cranial nerve deficit. Skin: Skin is warm and dry. No rash noted. No erythema. Psychiatric: Thought content normal.   Nursing note and vitals reviewed.     Data:   CBC:   Lab Results   Component Value Date    WBC 10.0 12/21/2017    RBC 2.43 12/21/2017    RBC 4.28 09/20/2011    HGB 7.6 12/21/2017    HCT 20.9 12/21/2017    MCV 85.7 12/21/2017    MCH 25.9 12/21/2017    MCHC 30.2 12/21/2017    RDW 20.4 12/21/2017     12/21/2017     09/20/2011    MPV NOT REPORTED 12/21/2017     CBC with Differential:    Lab Results   Component Value Date    WBC 10.0 12/21/2017    RBC 2.43 12/21/2017    RBC 4.28 09/20/2011    HGB 7.6 12/21/2017    HCT 20.9 12/21/2017     12/21/2017     09/20/2011    MCV 85.7 12/21/2017    MCH 25.9 12/21/2017    MCHC 30.2 12/21/2017    RDW 20.4 12/21/2017    LYMPHOPCT 10 12/21/2017    MONOPCT 9 12/21/2017    BASOPCT 0 12/21/2017    MONOSABS 0.90 12/21/2017    LYMPHSABS 1.00 12/21/2017    EOSABS 0.10 12/21/2017    BASOSABS 0.00 12/21/2017    DIFFTYPE NOT REPORTED 12/21/2017     Hemoglobin/Hematocrit:    Lab Results   Component Value Date    HGB 7.6 12/21/2017    HCT 20.9 12/21/2017     CMP:    Lab Results   Component Value Date     12/21/2017    K 3.5 12/21/2017    CL 98 12/21/2017    CO2 24 12/21/2017    BUN 15 12/21/2017

## 2017-12-21 NOTE — H&P
not actively bleeding. those two lesion could cause bleeding but the fact that bile is very clean with no blood old or fresh make that less likely the source of bleeding         Medications Prior to Admission:     Prior to Admission medications    Medication Sig Start Date End Date Taking? Authorizing Provider   ferrous sulfate 325 (65 Fe) MG tablet Take 1 tablet by mouth daily 9/1/17  Yes Korey Langford MD   atorvastatin (LIPITOR) 80 MG tablet take 1 tablet by mouth once daily 9/1/17  Yes Korey Langford MD   metoprolol tartrate (LOPRESSOR) 25 MG tablet Take 0.5 tablets by mouth 2 times daily 9/1/17  Yes Korey Langford MD   magnesium oxide (MAG-OX) 400 (241.3 Mg) MG TABS tablet Take 1 tablet by mouth daily 9/1/17  Yes Korey Langford MD   pantoprazole (PROTONIX) 40 MG tablet Take 1 tablet by mouth 2 times daily 4/5/17  Yes Korey Langford MD   omeprazole (PRILOSEC) 40 MG delayed release capsule Take 1 capsule by mouth daily 4/5/17  Yes Korey Langford MD   ondansetron [facility]) 8 MG tablet Take 1 tablet by mouth every 8 hours as needed for Nausea or Vomiting 4/5/17  Yes Korey Langford MD   vitamin B-12 (CYANOCOBALAMIN) 500 MCG tablet Take 1,000 mcg by mouth daily    Yes Historical Provider, MD        Allergies:     Review of patient's allergies indicates no known allergies. Social History:     Tobacco:    reports that he has been smoking Cigarettes. He has a 30.00 pack-year smoking history. He has never used smokeless tobacco.  Alcohol:      reports that he drinks alcohol. Drug Use:  reports that he does not use drugs. Family History:     History reviewed. No pertinent family history. Review of Systems:     Positive and Negative as described in HPI. CONSTITUTIONAL:  negative for fevers, chills, sweats, fatigue, weight loss  HEENT:  negative for vision, hearing changes, runny nose, throat pain  RESPIRATORY:  negative for shortness of breath, cough, congestion, wheezing.   CARDIOVASCULAR: negative for chest pain, palpitations. GASTROINTESTINAL:  negative for diarrhea, constipation, abdominal pain; positive melena   GENITOURINARY:  negative for difficulty of urination, burning with urination, frequency   INTEGUMENT:  negative for rash, skin lesions, easy bruising   HEMATOLOGIC/LYMPHATIC:  negative for swelling/edema   ALLERGIC/IMMUNOLOGIC:  negative for urticaria , itching  ENDOCRINE:  negative increase in drinking, increase in urination, hot or cold intolerance  MUSCULOSKELETAL:  negative joint pains, muscle aches, swelling of joints  NEUROLOGICAL:  negative for headaches, dizziness, lightheadedness, numbness, pain, tingling extremities  BEHAVIOR/PSYCH:  negative for depression, anxiety    Physical Exam:   BP (!) 116/48   Pulse 89   Temp 98.1 °F (36.7 °C) (Oral)   Resp 18   Ht 5' 10\" (1.778 m)   Wt 186 lb 12.8 oz (84.7 kg)   SpO2 100%   BMI 26.80 kg/m²   Temp (24hrs), Av.3 °F (36.8 °C), Min:97.5 °F (36.4 °C), Max:99 °F (37.2 °C)    No results for input(s): POCGLU in the last 72 hours. Intake/Output Summary (Last 24 hours) at 17 1348  Last data filed at 17 0645   Gross per 24 hour   Intake            862.5 ml   Output                0 ml   Net            862.5 ml       General Appearance:  alert, well appearing, and in no acute distress  Mental status: oriented to person, place, and time with normal affect  Head:  normocephalic, atraumatic. Eye: no icterus, redness, pupils equal and reactive, extraocular eye movements intact, conjunctiva clear  Ear: normal external ear, no discharge, hearing intact  Nose:  no drainage noted  Mouth: mucous membranes moist  Neck: supple, no carotid bruits, thyroid not palpable  Lungs: Bilateral equal air entry, clear to ausculation, no wheezing, rales or rhonchi, normal effort  Cardiovascular: normal rate, regular rhythm, no murmur, gallop, rub.   Abdomen: Soft, nontender, nondistended, normal bowel sounds, no hepatomegaly or Hemoglobin 7.6 (L) 13.5 - 17.5 g/dL       Imaging/Diagnostics:    AAS:     Impression   No acute findings.       Right lung base subsegmental atelectasis or scarring. Assessment :      Primary Problem  GI bleed    Active Hospital Problems    Diagnosis Date Noted    Acute GI hemorrhage [K92.2] 02/24/2016    Benign essential HTN [I10]     Coronary artery disease involving native coronary artery of native heart without angina pectoris [I25.10]     Smoker [F17.200] 11/14/2015    Acute blood loss anemia [D62] 11/13/2015    GI bleed [K92.2]        Plan:     Patient status Admit as inpatient in the  Med/Surge    1. Gi eval  2. egd tomorrow  3. Serial h/h  4. reduce ivf rate  5. protonix drip  6. Smoking cessation    Consultations:   IP CONSULT TO INTERNAL MEDICINE  IP CONSULT TO GI     Patient is admitted as inpatient status because of co-morbidities listed above, severity of signs and symptoms as outlined, requirement for current medical therapies and most importantly because of direct risk to patient if care not provided in a hospital setting.     Bruce Blum, DO  12/21/2017  1:48 PM    Copy sent to Dr. Demar Franco MD

## 2017-12-22 ENCOUNTER — ANESTHESIA (OUTPATIENT)
Dept: OPERATING ROOM | Age: 62
DRG: 378 | End: 2017-12-22
Payer: COMMERCIAL

## 2017-12-22 VITALS
RESPIRATION RATE: 17 BRPM | SYSTOLIC BLOOD PRESSURE: 112 MMHG | OXYGEN SATURATION: 100 % | DIASTOLIC BLOOD PRESSURE: 59 MMHG

## 2017-12-22 LAB
ABSOLUTE EOS #: 0.1 K/UL (ref 0–0.4)
ABSOLUTE IMMATURE GRANULOCYTE: ABNORMAL K/UL (ref 0–0.3)
ABSOLUTE LYMPH #: 0.7 K/UL (ref 1–4.8)
ABSOLUTE MONO #: 0.6 K/UL (ref 0.2–0.8)
ALBUMIN SERPL-MCNC: 3.2 G/DL (ref 3.5–5.2)
ALBUMIN/GLOBULIN RATIO: ABNORMAL (ref 1–2.5)
ALP BLD-CCNC: 62 U/L (ref 40–129)
ALT SERPL-CCNC: 12 U/L (ref 5–41)
ANION GAP SERPL CALCULATED.3IONS-SCNC: 12 MMOL/L (ref 9–17)
AST SERPL-CCNC: 14 U/L
BASOPHILS # BLD: 1 % (ref 0–2)
BASOPHILS ABSOLUTE: 0 K/UL (ref 0–0.2)
BILIRUB SERPL-MCNC: 0.92 MG/DL (ref 0.3–1.2)
BUN BLDV-MCNC: 9 MG/DL (ref 8–23)
BUN/CREAT BLD: 8 (ref 9–20)
CALCIUM SERPL-MCNC: 7.9 MG/DL (ref 8.6–10.4)
CHLORIDE BLD-SCNC: 106 MMOL/L (ref 98–107)
CO2: 23 MMOL/L (ref 20–31)
CREAT SERPL-MCNC: 1.15 MG/DL (ref 0.7–1.2)
DIFFERENTIAL TYPE: ABNORMAL
EOSINOPHILS RELATIVE PERCENT: 1 % (ref 1–4)
GFR AFRICAN AMERICAN: >60 ML/MIN
GFR NON-AFRICAN AMERICAN: >60 ML/MIN
GFR SERPL CREATININE-BSD FRML MDRD: ABNORMAL ML/MIN/{1.73_M2}
GFR SERPL CREATININE-BSD FRML MDRD: ABNORMAL ML/MIN/{1.73_M2}
GLUCOSE BLD-MCNC: 104 MG/DL (ref 70–99)
HCT VFR BLD CALC: 24.6 % (ref 41–53)
HEMOGLOBIN: 6.7 G/DL (ref 13.5–17.5)
HEMOGLOBIN: 7.7 G/DL (ref 13.5–17.5)
HEMOGLOBIN: 8.2 G/DL (ref 13.5–17.5)
IMMATURE GRANULOCYTES: ABNORMAL %
LYMPHOCYTES # BLD: 11 % (ref 24–44)
MCH RBC QN AUTO: 26.7 PG (ref 26–34)
MCHC RBC AUTO-ENTMCNC: 31.1 G/DL (ref 31–37)
MCV RBC AUTO: 85.7 FL (ref 80–100)
MONOCYTES # BLD: 9 % (ref 1–7)
PDW BLD-RTO: 18 % (ref 11.5–14.5)
PLATELET # BLD: 302 K/UL (ref 130–400)
PLATELET ESTIMATE: ABNORMAL
PMV BLD AUTO: ABNORMAL FL (ref 6–12)
POTASSIUM SERPL-SCNC: 3.6 MMOL/L (ref 3.7–5.3)
RBC # BLD: 2.87 M/UL (ref 4.5–5.9)
RBC # BLD: ABNORMAL 10*6/UL
SEG NEUTROPHILS: 78 % (ref 36–66)
SEGMENTED NEUTROPHILS ABSOLUTE COUNT: 5.2 K/UL (ref 1.8–7.7)
SODIUM BLD-SCNC: 141 MMOL/L (ref 135–144)
TOTAL PROTEIN: 5.9 G/DL (ref 6.4–8.3)
WBC # BLD: 6.6 K/UL (ref 3.5–11)
WBC # BLD: ABNORMAL 10*3/UL

## 2017-12-22 PROCEDURE — 99232 SBSQ HOSP IP/OBS MODERATE 35: CPT | Performed by: INTERNAL MEDICINE

## 2017-12-22 PROCEDURE — 6360000002 HC RX W HCPCS: Performed by: NURSE PRACTITIONER

## 2017-12-22 PROCEDURE — 3700000000 HC ANESTHESIA ATTENDED CARE: Performed by: INTERNAL MEDICINE

## 2017-12-22 PROCEDURE — 0W3P8ZZ CONTROL BLEEDING IN GASTROINTESTINAL TRACT, VIA NATURAL OR ARTIFICIAL OPENING ENDOSCOPIC: ICD-10-PCS | Performed by: INTERNAL MEDICINE

## 2017-12-22 PROCEDURE — P9016 RBC LEUKOCYTES REDUCED: HCPCS

## 2017-12-22 PROCEDURE — 6360000002 HC RX W HCPCS: Performed by: NURSE ANESTHETIST, CERTIFIED REGISTERED

## 2017-12-22 PROCEDURE — 43255 EGD CONTROL BLEEDING ANY: CPT | Performed by: INTERNAL MEDICINE

## 2017-12-22 PROCEDURE — 7100000011 HC PHASE II RECOVERY - ADDTL 15 MIN: Performed by: INTERNAL MEDICINE

## 2017-12-22 PROCEDURE — 2580000003 HC RX 258: Performed by: NURSE PRACTITIONER

## 2017-12-22 PROCEDURE — 36430 TRANSFUSION BLD/BLD COMPNT: CPT

## 2017-12-22 PROCEDURE — 2780000010 HC IMPLANT OTHER: Performed by: INTERNAL MEDICINE

## 2017-12-22 PROCEDURE — 2580000003 HC RX 258: Performed by: NURSE ANESTHETIST, CERTIFIED REGISTERED

## 2017-12-22 PROCEDURE — 80053 COMPREHEN METABOLIC PANEL: CPT

## 2017-12-22 PROCEDURE — C9113 INJ PANTOPRAZOLE SODIUM, VIA: HCPCS | Performed by: NURSE PRACTITIONER

## 2017-12-22 PROCEDURE — 1200000000 HC SEMI PRIVATE

## 2017-12-22 PROCEDURE — 36415 COLL VENOUS BLD VENIPUNCTURE: CPT

## 2017-12-22 PROCEDURE — 2500000003 HC RX 250 WO HCPCS: Performed by: NURSE ANESTHETIST, CERTIFIED REGISTERED

## 2017-12-22 PROCEDURE — 85025 COMPLETE CBC W/AUTO DIFF WBC: CPT

## 2017-12-22 PROCEDURE — 6370000000 HC RX 637 (ALT 250 FOR IP): Performed by: INTERNAL MEDICINE

## 2017-12-22 PROCEDURE — 6370000000 HC RX 637 (ALT 250 FOR IP): Performed by: NURSE PRACTITIONER

## 2017-12-22 PROCEDURE — 7100000010 HC PHASE II RECOVERY - FIRST 15 MIN: Performed by: INTERNAL MEDICINE

## 2017-12-22 PROCEDURE — 2580000003 HC RX 258: Performed by: INTERNAL MEDICINE

## 2017-12-22 PROCEDURE — 86900 BLOOD TYPING SEROLOGIC ABO: CPT

## 2017-12-22 PROCEDURE — 3609017100 HC EGD: Performed by: INTERNAL MEDICINE

## 2017-12-22 PROCEDURE — 85018 HEMOGLOBIN: CPT

## 2017-12-22 PROCEDURE — 3700000001 HC ADD 15 MINUTES (ANESTHESIA): Performed by: INTERNAL MEDICINE

## 2017-12-22 DEVICE — WORKING LENGTH 235CM, WORKING CHANNEL 2.8MM
Type: IMPLANTABLE DEVICE | Site: STOMACH | Status: FUNCTIONAL
Brand: RESOLUTION 360 CLIP

## 2017-12-22 RX ORDER — PROPOFOL 10 MG/ML
INJECTION, EMULSION INTRAVENOUS PRN
Status: DISCONTINUED | OUTPATIENT
Start: 2017-12-22 | End: 2017-12-22 | Stop reason: SDUPTHER

## 2017-12-22 RX ORDER — 0.9 % SODIUM CHLORIDE 0.9 %
250 INTRAVENOUS SOLUTION INTRAVENOUS ONCE
Status: COMPLETED | OUTPATIENT
Start: 2017-12-22 | End: 2017-12-22

## 2017-12-22 RX ORDER — LIDOCAINE HYDROCHLORIDE 20 MG/ML
INJECTION, SOLUTION EPIDURAL; INFILTRATION; INTRACAUDAL; PERINEURAL PRN
Status: DISCONTINUED | OUTPATIENT
Start: 2017-12-22 | End: 2017-12-22 | Stop reason: SDUPTHER

## 2017-12-22 RX ORDER — SODIUM CHLORIDE, SODIUM LACTATE, POTASSIUM CHLORIDE, CALCIUM CHLORIDE 600; 310; 30; 20 MG/100ML; MG/100ML; MG/100ML; MG/100ML
INJECTION, SOLUTION INTRAVENOUS CONTINUOUS PRN
Status: DISCONTINUED | OUTPATIENT
Start: 2017-12-22 | End: 2017-12-22 | Stop reason: SDUPTHER

## 2017-12-22 RX ADMIN — ATORVASTATIN CALCIUM 80 MG: 80 TABLET, FILM COATED ORAL at 12:16

## 2017-12-22 RX ADMIN — PROPOFOL 60 MG: 10 INJECTION, EMULSION INTRAVENOUS at 09:50

## 2017-12-22 RX ADMIN — CYANOCOBALAMIN TAB 1000 MCG 1000 MCG: 1000 TAB at 12:16

## 2017-12-22 RX ADMIN — PROPOFOL 20 MG: 10 INJECTION, EMULSION INTRAVENOUS at 09:54

## 2017-12-22 RX ADMIN — SODIUM CHLORIDE 8 MG/HR: 9 INJECTION, SOLUTION INTRAVENOUS at 21:55

## 2017-12-22 RX ADMIN — FERROUS SULFATE TAB EC 325 MG (65 MG FE EQUIVALENT) 325 MG: 325 (65 FE) TABLET DELAYED RESPONSE at 12:16

## 2017-12-22 RX ADMIN — SODIUM CHLORIDE, POTASSIUM CHLORIDE, SODIUM LACTATE AND CALCIUM CHLORIDE: 600; 310; 30; 20 INJECTION, SOLUTION INTRAVENOUS at 09:43

## 2017-12-22 RX ADMIN — METOPROLOL TARTRATE 12.5 MG: 25 TABLET ORAL at 20:41

## 2017-12-22 RX ADMIN — METOPROLOL TARTRATE 12.5 MG: 25 TABLET ORAL at 12:16

## 2017-12-22 RX ADMIN — PROPOFOL 60 MG: 10 INJECTION, EMULSION INTRAVENOUS at 09:46

## 2017-12-22 RX ADMIN — SODIUM CHLORIDE 8 MG/HR: 9 INJECTION, SOLUTION INTRAVENOUS at 08:51

## 2017-12-22 RX ADMIN — LIDOCAINE HYDROCHLORIDE 100 MG: 20 INJECTION, SOLUTION EPIDURAL; INFILTRATION; INTRACAUDAL; PERINEURAL at 09:46

## 2017-12-22 RX ADMIN — PROPOFOL 20 MG: 10 INJECTION, EMULSION INTRAVENOUS at 09:57

## 2017-12-22 RX ADMIN — MAGNESIUM OXIDE TAB 400 MG (241.3 MG ELEMENTAL MG) 400 MG: 400 (241.3 MG) TAB at 12:17

## 2017-12-22 RX ADMIN — SODIUM CHLORIDE 250 ML: 9 INJECTION, SOLUTION INTRAVENOUS at 01:29

## 2017-12-22 ASSESSMENT — PULMONARY FUNCTION TESTS
PIF_VALUE: 0

## 2017-12-22 NOTE — PROGRESS NOTES
Pt drinks small amount of bowel prep for EGD & colonoscopy for tomorrow. Informed pt of importance & reason for bowel prep. Pt states that he went into bathroom before change of shift & vomited after drinking bowel prep & refuses to drink any more. Offered to mix bowel prep with gingerale or Gatorade, & offered PRN Zofran. Pt continues to refuse to drink any more bowel prep for concern of repeated emesis. Dr. Pasquale Campoverde paged via [facility]. Awaiting orders or return call.

## 2017-12-22 NOTE — PROGRESS NOTES
Upon entering room during rounding at change of shift, informed pt that he is to be transferred to the [place] Unit, due to admitting orders. Pt requests to see supervisor wishing to stay on current unit. \"If I'm going to leave after the procedure tomorrow, I'd rather stay right here. \"  Marixa Crawford, house supervisor, notified & spoke with pt, who reiterated request to stay on unit. Transfer to Med-Surg unit canceled.

## 2017-12-22 NOTE — PLAN OF CARE
Problem: Falls - Risk of  Goal: Absence of falls  Outcome: Ongoing  Pt fall risk, fall band present, falling star, safety alarm activated and in use as needed. Hourly rounding performed. Pt encouraged to use call light. See Marilee Alvarado fall risk assessment.

## 2017-12-22 NOTE — OP NOTE
ESOPHAGOGASTRODUODENOSCOPY   ( EGD )  DATE OF PROCEDURE: 12/22/2017     SURGEON: Dominique Desai MD    ASSISTANT: None    PREOPERATIVE DIAGNOSIS: Hematemesis. Probable upper GI source. POSTOPERATIVE DIAGNOSIS: Small ulcer in the upper part of the stomach with fresh bleeding. This ulcer is buried in between the folds. OPERATION: Upper GI endoscopy , Endo Clip application. ANESTHESIA: Moderate Sedation     ESTIMATED BLOOD LOSS: None    COMPLICATIONS: None. SPECIMENS:  Was Not Obtained    HISTORY: The patient is a 60y.o. year old male with history of above preop diagnosis. I recommended esophagogastroduodenoscopy with possible biopsy and I explained the risk, benefits, expected outcome, and alternatives to the procedure. Risks included but are not limited to bleeding, infection, respiratory distress, hypotension, and perforation of the esophagus, stomach, or duodenum. Patient understands and is in agreement. PROCEDURE: The patient was given IV conscious sedation. The patient's SPO2 remained above 90% throughout the procedure. Cetacaine spray given. Patient placed in left lateral position. Olympus  videogastroscope was inserted orally under vision into the esophagus without difficulty and advanced into the stomach then through the pylorus up to the second part of duodenum. Findings:    Retropharyngeal area was grossly normal appearing    Esophagus: normal    Stomach:    Fundus and Cardia Examined in Retroflexed View: normal    Body: abnormal: In the upper part of the stomach there is an ulcer with fresh bleed. The ulcer is buried in between the folds. There was also fresh blood present in the body of the stomach which was aspirated. Polaroid picture was taken. I did apply 2 endoclips to the ulcer with perfect hemostasis. No further oozing of fresh blood noted.       Antrum: normal    Duodenum:     Descending: normal    Bulb: normal    While withdrawing the scope the butamben-tetracaine-benzocaine (CETACAINE) spray    PRN Errol Bumpers, MD   1 spray at 12/22/17 0949    [MAR Hold] atorvastatin (LIPITOR) tablet 80 mg  80 mg Oral Daily Chel D Delgrosso, CNP   80 mg at 12/21/17 0817    [MAR Hold] ferrous sulfate EC tablet 325 mg  325 mg Oral Daily Chel IDANIA Palencia CNP   325 mg at 12/21/17 1526    [MAR Hold] magnesium oxide (MAG-OX) tablet 400 mg  400 mg Oral Daily Chel IDANIA Palencia CNP   400 mg at 12/21/17 0817    [MAR Hold] metoprolol tartrate (LOPRESSOR) tablet 12.5 mg  12.5 mg Oral BID Chel Palencia CNP   12.5 mg at 12/21/17 2033    [MAR Hold] vitamin B-12 (CYANOCOBALAMIN) tablet 1,000 mcg  1,000 mcg Oral Daily Chel Palencia CNP   1,000 mcg at 12/21/17 0817    [MAR Hold] 0.9 % sodium chloride infusion   Intravenous Continuous Bruce RUSSO Blood, DO 75 mL/hr at 12/21/17 2012     [facility] Hold] sodium chloride flush 0.9 % injection 10 mL  10 mL Intravenous 2 times per day Chel Palencia CNP   10 mL at 12/21/17 0816    [MAR Hold] sodium chloride flush 0.9 % injection 10 mL  10 mL Intravenous PRN Chel Palencia CNP        [MAR Hold] acetaminophen (TYLENOL) tablet 650 mg  650 mg Oral Q4H PRN Chel Palencia CNP        [MAR Hold] morphine (PF) injection 2 mg  2 mg Intravenous Q2H PRN Chel Palencia CNP        Or    [MAR Hold] morphine injection 4 mg  4 mg Intravenous Q2H PRN Chel Palencia CNP        [MAR Hold] ondansetron (ZOFRAN) injection 4 mg  4 mg Intravenous Q6H PRN Chel Palencia CNP        [MAR Hold] pantoprazole (PROTONIX) 80 mg in sodium chloride 0.9 % 100 mL infusion  8 mg/hr Intravenous Continuous Chel Palencia CNP 10 mL/hr at 12/22/17 0851 8 mg/hr at 12/22/17 2309     Facility-Administered Medications Ordered in Other Encounters   Medication Dose Route Frequency Provider Last Rate Last Dose    lactated ringers infusion    Continuous PRN Beck Cam CRNA 100 mL/hr at 12/22/17 1127      propofol injection    PRN Fredy Campbell CRNA   20 mg at 12/22/17 0957    lidocaine PF 2 % injection    PRN Fredy Campbell, CRNA   100 mg at 12/22/17 5405    acetaminophen (OFIRMEV) infusion 1,000 mg  1,000 mg Intravenous Once Kavitha Jarvis MD           Allergies:   No Known Allergies            Problems with Sedation/Anesthesia in the past? no    REVIEW OF SYSTEMS:  12 point review of systems negative other than mentioned above.       PHYSICAL EXAM:    Vitals:  BP (!) 116/58   Pulse 77   Temp 98.2 °F (36.8 °C) (Oral)   Resp 16   Ht 5' 10\" (1.778 m)   Wt 186 lb 12.8 oz (84.7 kg)   SpO2 98%   BMI 26.80 kg/m²     Focused Exam related to procedure:    General appearance: NAD, conversant   Eyes: anicteric sclerae, moist conjunctivae; no lid-lag; PERRLA   Lungs: CTA, with normal respiratory effort and no intercostal retractions   CV: RRR, no MRGs   Abdomen: Soft, non-tender; no masses or HSM   Skin: Normal temperature, turgor and texture; no rash, ulcers or subcutaneous nodules     DATA:  CBC:   Lab Results   Component Value Date    WBC 6.6 12/22/2017    HGB 7.7 (L) 12/22/2017    HCT 24.6 (L) 12/22/2017    MCV 85.7 12/22/2017     12/22/2017     BUN/Cr:   Lab Results   Component Value Date    BUN 9 12/22/2017   ,   Lab Results   Component Value Date    CREATININE 1.15 12/22/2017     Potassium:   Lab Results   Component Value Date    K 3.6 (L) 12/22/2017     PT/INR:   Lab Results   Component Value Date    INR 1.0 12/21/2017    INR 1.1 01/12/2017    INR 1.0 12/09/2016    PROTIME 10.6 12/21/2017    PROTIME 11.4 01/12/2017    PROTIME 10.7 12/09/2016           Louis Ralph         Electronically signed by Patrick Hart MD  on 12/22/2017 at 10:04 AM

## 2017-12-22 NOTE — PROGRESS NOTES
Lab called with critical Hgb = 6.7. Pt denies blood in emesis or stool today. Dr. Nicky Villarreal paged & informed. Orders received for one unit of pRBC's. Pt only has one IV site with Protonix drip infusing. Pt refuses to have second IV start due to IV being restarted numerous times today. Pt allows blood transfusion & to have Protonix drip stopped until transfusion completed, & then restarted.

## 2017-12-22 NOTE — PROGRESS NOTES
[facility]    Progress Note    12/22/2017    11:49 AM    Name:   Yvette Jama  MRN:     [id number]     Ana Cristinalyside:      [de-identified]   Room:   [address] [place] [address] Day:  1  Admit Date:  12/21/2017  2:35 AM    PCP:   Angela Tenorio MD  Code Status:  Full Code    Subjective:     C/C:   Chief Complaint   Patient presents with    Hematemesis     Interval History Status: improved. Had egd today-clips placed on active bleeding gastric ulcer  No further bleeding    Brief History:     The patient is a 60 y.o. AA male who presents with Hematemesis   and he is admitted to the hospital for the management of  Hematemesis. This occurred 2 days ago, painless. No hematochezia, did have melena. No f/c/s/s/cp/sob    Review of Systems:     Constitutional:  negative for chills, fevers, sweats  Respiratory:  negative for cough, dyspnea on exertion, shortness of breath, wheezing  Cardiovascular:  negative for chest pain, chest pressure/discomfort, lower extremity edema, palpitations  Gastrointestinal:  negative for abdominal pain, constipation, diarrhea, nausea, vomiting  Neurological:  negative for dizziness, headache    Medications:      Allergies:  No Known Allergies    Current Meds:   Scheduled Meds:    sodium chloride  250 mL Intravenous Once    [MAR Hold] atorvastatin  80 mg Oral Daily    [MAR Hold] ferrous sulfate  325 mg Oral Daily    [MAR Hold] magnesium oxide  400 mg Oral Daily    [MAR Hold] metoprolol tartrate  12.5 mg Oral BID    [MAR Hold] vitamin B-12  1,000 mcg Oral Daily    [MAR Hold] sodium chloride flush  10 mL Intravenous 2 times per day     Continuous Infusions:    [MAR Hold] sodium chloride 75 mL/hr at 12/21/17 2012    [MAR Hold] pantoprozole (PROTONIX) infusion 8 mg/hr (12/22/17 0851)     PRN Meds: [CEB Hold] sodium chloride flush, [MAR Hold] acetaminophen, [MAR Hold] morphine **OR** [MAR Hold] morphine, [MAR Hold] ondansetron    Data:

## 2017-12-22 NOTE — ANESTHESIA PRE PROCEDURE
mg at 12/21/17 0817    [MAR Hold] metoprolol tartrate (LOPRESSOR) tablet 12.5 mg  12.5 mg Oral BID Chel Palencia CNP   12.5 mg at 12/21/17 2033    [MAR Hold] vitamin B-12 (CYANOCOBALAMIN) tablet 1,000 mcg  1,000 mcg Oral Daily Chel Palencia CNP   1,000 mcg at 12/21/17 0817    [MAR Hold] 0.9 % sodium chloride infusion   Intravenous Continuous Bruce RUSSO Blood, DO 75 mL/hr at 12/21/17 2012     [facility] Hold] sodium chloride flush 0.9 % injection 10 mL  10 mL Intravenous 2 times per day Chel Palencia CNP   10 mL at 12/21/17 0816    [MAR Hold] sodium chloride flush 0.9 % injection 10 mL  10 mL Intravenous PRN Chel Palencia CNP        [MAR Hold] acetaminophen (TYLENOL) tablet 650 mg  650 mg Oral Q4H PRN Chel Palencia CNP        [MAR Hold] morphine (PF) injection 2 mg  2 mg Intravenous Q2H PRN Chel Palencia CNP        Or    [MAR Hold] morphine injection 4 mg  4 mg Intravenous Q2H PRN Chel Palencia CNP        [MAR Hold] ondansetron (ZOFRAN) injection 4 mg  4 mg Intravenous Q6H PRN Chel Palencia CNP        [MAR Hold] pantoprazole (PROTONIX) 80 mg in sodium chloride 0.9 % 100 mL infusion  8 mg/hr Intravenous Continuous Chel Palencia CNP 10 mL/hr at 12/22/17 0851 8 mg/hr at 12/22/17 1502     Facility-Administered Medications Ordered in Other Encounters   Medication Dose Route Frequency Provider Last Rate Last Dose    acetaminophen (OFIRMEV) infusion 1,000 mg  1,000 mg Intravenous Once Claudia Morales MD           Allergies:  No Known Allergies    Problem List:    Patient Active Problem List   Diagnosis Code    Hypercholesteremia E78.00    GERD (gastroesophageal reflux disease) K21.9    Dizzy R42    Anemia D64.9    Epigastric pain R10.13    Upper GI bleed K92.2    Blood loss anemia D50.0    GI bleed K92.2    Sigmoid polyp D12.5    Gastritis K29.70    Acute blood loss anemia D62    Gastric ulcer K25.9    CKD (chronic kidney disease) stage 3, GFR 30-59 ml/min N18.3    Gastrointestinal hemorrhage K92.2    LEVI (acute kidney injury) ([facility] N17.9    Mild malnutrition (HCC) E44.1    Smoker F17.200    Colon polyp K63.5    Essential hypertension I10    Coronary artery disease involving native heart without angina pectoris I25.10    Hyperglycemia R73.9    Hypokalemia E87.6    Severe anemia D64.9    Acute lower GI bleeding K92.2    Coronary artery disease involving native coronary artery of native heart without angina pectoris I25.10    Benign essential HTN I10    Acute GI hemorrhage K92.2    Gastrointestinal hemorrhage with hematemesis K92.0    AVM (arteriovenous malformation) of duodenum, acquired K31.819    History of colon polyps Z86.010    Diverticulosis of colon K57.30    Adenomatous colon polyp D12.6    Hematemesis K92.0    Chronic fatigue R53.82    PUD (peptic ulcer disease) K27.9    Anemia D64.9    Diverticulosis of large intestine without hemorrhage K57.30    Arthritis of knee M17.10    Cerebellar cerebrovascular accident (CVA) without late effect ([facility] [facility] I63.9    Syncope and collapse R55    Multi-infarct state I69.30    Left homonymous hemianopsia H53.462    Occipital cerebral infarction (HCC) I63.9    Visual impairment severe bilaterally H54.2X22    Hypotension due to blood loss I95.89    Acute ischemic stroke (HCC) I63.9    Hypotension I95.9    Symptomatic anemia D64.9    History of GI bleed Z87.19    AVM (arteriovenous malformation) Q27.30    Iron deficiency anemia D50.9    Prolonged Q-T interval on ECG R94.31       Past Medical History:        Diagnosis Date    Adenomatous colon polyp 2016    Arthritis     CVA (cerebral vascular accident) ([facility] [address] 03/2016    Diverticulosis of colon     History of colon polyps 2016    Hyperlipidemia     Hypertension     MI (myocardial infarction) 2011    Poor historian        Past Surgical History:        Procedure Laterality Date    CARDIAC SURGERY      COLON SURGERY

## 2017-12-23 VITALS
BODY MASS INDEX: 26.74 KG/M2 | TEMPERATURE: 98.4 F | HEART RATE: 70 BPM | WEIGHT: 186.8 LBS | RESPIRATION RATE: 16 BRPM | OXYGEN SATURATION: 100 % | SYSTOLIC BLOOD PRESSURE: 98 MMHG | HEIGHT: 70 IN | DIASTOLIC BLOOD PRESSURE: 59 MMHG

## 2017-12-23 LAB
ABO/RH: NORMAL
ANTIBODY SCREEN: NEGATIVE
ARM BAND NUMBER: NORMAL
BLD PROD TYP BPU: NORMAL
BLD PROD TYP BPU: NORMAL
CROSSMATCH RESULT: NORMAL
CROSSMATCH RESULT: NORMAL
DISPENSE STATUS BLOOD BANK: NORMAL
DISPENSE STATUS BLOOD BANK: NORMAL
EXPIRATION DATE: NORMAL
HEMOGLOBIN: 7.5 G/DL (ref 13.5–17.5)
HEMOGLOBIN: 7.6 G/DL (ref 13.5–17.5)
TRANSFUSION STATUS: NORMAL
TRANSFUSION STATUS: NORMAL
UNIT DIVISION: 0
UNIT DIVISION: 0
UNIT NUMBER: NORMAL
UNIT NUMBER: NORMAL

## 2017-12-23 PROCEDURE — 6370000000 HC RX 637 (ALT 250 FOR IP): Performed by: NURSE PRACTITIONER

## 2017-12-23 PROCEDURE — 36415 COLL VENOUS BLD VENIPUNCTURE: CPT

## 2017-12-23 PROCEDURE — 2580000003 HC RX 258: Performed by: INTERNAL MEDICINE

## 2017-12-23 PROCEDURE — 2580000003 HC RX 258: Performed by: NURSE PRACTITIONER

## 2017-12-23 PROCEDURE — 6360000002 HC RX W HCPCS: Performed by: NURSE PRACTITIONER

## 2017-12-23 PROCEDURE — 85018 HEMOGLOBIN: CPT

## 2017-12-23 PROCEDURE — C9113 INJ PANTOPRAZOLE SODIUM, VIA: HCPCS | Performed by: NURSE PRACTITIONER

## 2017-12-23 PROCEDURE — 99232 SBSQ HOSP IP/OBS MODERATE 35: CPT | Performed by: INTERNAL MEDICINE

## 2017-12-23 RX ORDER — FERROUS SULFATE 325(65) MG
325 TABLET ORAL
Qty: 90 TABLET | Refills: 3 | Status: SHIPPED | OUTPATIENT
Start: 2017-12-23 | End: 2018-07-03

## 2017-12-23 RX ORDER — ATORVASTATIN CALCIUM 80 MG/1
TABLET, FILM COATED ORAL
Qty: 30 TABLET | Refills: 3 | Status: SHIPPED | OUTPATIENT
Start: 2017-12-23 | End: 2018-12-03

## 2017-12-23 RX ORDER — PANTOPRAZOLE SODIUM 40 MG/1
40 TABLET, DELAYED RELEASE ORAL 2 TIMES DAILY
Qty: 60 TABLET | Refills: 2 | Status: SHIPPED | OUTPATIENT
Start: 2017-12-23 | End: 2018-12-03 | Stop reason: ALTCHOICE

## 2017-12-23 RX ADMIN — SODIUM CHLORIDE 8 MG/HR: 9 INJECTION, SOLUTION INTRAVENOUS at 08:39

## 2017-12-23 RX ADMIN — MAGNESIUM OXIDE TAB 400 MG (241.3 MG ELEMENTAL MG) 400 MG: 400 (241.3 MG) TAB at 08:43

## 2017-12-23 RX ADMIN — METOPROLOL TARTRATE 12.5 MG: 25 TABLET ORAL at 08:43

## 2017-12-23 RX ADMIN — SODIUM CHLORIDE: 9 INJECTION, SOLUTION INTRAVENOUS at 08:40

## 2017-12-23 RX ADMIN — FERROUS SULFATE TAB EC 325 MG (65 MG FE EQUIVALENT) 325 MG: 325 (65 FE) TABLET DELAYED RESPONSE at 11:22

## 2017-12-23 RX ADMIN — CYANOCOBALAMIN TAB 1000 MCG 1000 MCG: 1000 TAB at 08:43

## 2017-12-23 RX ADMIN — ATORVASTATIN CALCIUM 80 MG: 80 TABLET, FILM COATED ORAL at 08:43

## 2017-12-23 NOTE — PROGRESS NOTES
accident) [facility]); Diverticulosis of colon; History of colon polyps; Hyperlipidemia; Hypertension; MI (myocardial infarction); and Poor historian. Social History:   reports that he has been smoking Cigarettes. He has a 30.00 pack-year smoking history. He has never used smokeless tobacco. He reports that he drinks alcohol. He reports that he does not use drugs. Family History: History reviewed. No pertinent family history. Vitals:  BP (!) 126/54   Pulse 69   Temp 98.6 °F (37 °C) (Oral)   Resp 16   Ht 5' 10\" (1.778 m)   Wt 186 lb 12.8 oz (84.7 kg)   SpO2 95%   BMI 26.80 kg/m²   Temp (24hrs), Av.5 °F (36.9 °C), Min:98.1 °F (36.7 °C), Max:98.8 °F (37.1 °C)    No results for input(s): POCGLU in the last 72 hours. I/O (24Hr): Intake/Output Summary (Last 24 hours) at 17 1052  Last data filed at 17 6843   Gross per 24 hour   Intake           1571.2 ml   Output                0 ml   Net           1571.2 ml       Labs:    Hematology:  Recent Labs      17   0249   17   0834  17   1536  17   0004  17   0753   WBC  10.0   --   6.6   --    --    --    RBC  2.43*   --   2.87*   --    --    --    HGB  6.3*   < >  7.7*  8.2*  7.6*  7.5*   HCT  20.9*   --   24.6*   --    --    --    MCV  85.7   --   85.7   --    --    --    MCH  25.9*   --   26.7   --    --    --    MCHC  30.2*   --   31.1   --    --    --    RDW  20.4*   --   18.0*   --    --    --    PLT  399   --   302   --    --    --    MPV  NOT REPORTED   --   NOT REPORTED   --    --    --    INR  1.0   --    --    --    --    --     < > = values in this interval not displayed.      Chemistry:  Recent Labs      17   0249  17   0834   NA  135  141   K  3.5*  3.6*   CL  98  106   CO2  24  23   GLUCOSE  140*  104*   BUN  15  9   CREATININE  1.51*  1.15   MG  2.3   --    ANIONGAP  13  12   LABGLOM  47*  >60   GFRAA  57*  >60   CALCIUM  8.6  7.9*     Recent Labs      17   7659 PROT  7.2  5.9*   LABALBU  4.1  3.2*   AST  24  14   ALT  12  12   ALKPHOS  78  62   BILITOT  0.34  0.92   BILIDIR  0.10   --    LIPASE  15   --      Lab Results   Component Value Date/Time    SPECIAL NOT REPORTED 12/08/2016 06:35 PM     Lab Results   Component Value Date/Time    CULTURE NO GROWTH 01/13/2016 05:33 PM    CULTURE  01/13/2016 05:33 PM     Performed at [address], [address] [place] [address] [phone]     Physical Examination:        General appearance:  alert, cooperative and no distress  Mental Status:  oriented to person, place and time and normal affect  Lungs:  clear to auscultation bilaterally, normal effort  Heart:  regular rate and rhythm, no murmur  Abdomen:  soft, nontender, nondistended, normal bowel sounds, no masses, hepatomegaly, splenomegaly  Extremities:  no edema, redness, tenderness in the calves  Skin:  no gross lesions, rashes, induration    Assessment:        Primary Problem  Acute gastric ulcer with hemorrhage    Active Hospital Problems    Diagnosis Date Noted    Acute gastric ulcer with hemorrhage [K25.0] 02/24/2016    Benign essential HTN [I10]     Coronary artery disease involving native coronary artery of native heart without angina pectoris [I25.10]     Smoker [F17.200] 11/14/2015    Acute blood loss anemia [D62] 11/13/2015       Plan:        Principal Problem:    Acute gastric ulcer with hemorrhage: Status post EGD and endo clip placement. No more bleeding. Possibly advancement of diet and discharge today    Acute blood loss anemia on top of chronic anemia    History of stroke: will benefit from aspirin once hemoglobin improves.  Recheck in [place]    Smoker    Coronary artery disease involving native coronary artery of native heart without angina pectoris    Benign essential HTN        Adireddy Sherlynn Frankel, MD  12/23/2017  10:52 AM

## 2017-12-23 NOTE — PLAN OF CARE
Problem: Falls - Risk of  Goal: Absence of falls  Outcome: Ongoing  Pt fall risk, fall band present, falling star, safety alarm activated and in use as needed. Hourly rounding performed. Pt encouraged to use call light. See Jaspreet Olivo fall risk assessment.

## 2017-12-23 NOTE — DISCHARGE SUMMARY
[facility]    Discharge Summary     Patient ID: Alva Merida  :  [birth year]   MRN: [id number]     ACCOUNT:  [de-identified]   Patient's PCP: Gissell Spence MD  Admit Date: 2017   Discharge Date: 2017     Length of Stay: 2  Code Status:  Full Code  Admitting Physician: Jean Robledo MD  Discharge Physician: Jean Robledo MD     Active Discharge Diagnoses:     Primary Problem  Acute gastric ulcer with hemorrhage      [place] Problems    Diagnosis Date Noted    Acute gastric ulcer with hemorrhage [K25.0] 2016    Benign essential HTN [I10]     Coronary artery disease involving native coronary artery of native heart without angina pectoris [I25.10]     Smoker [F17.200] 2015    Acute blood loss anemia [D62] 2015       Admission Condition:  poor     Discharged Condition: fair    Hospital Stay:     Hospital Course: The patient is a 60 y.o. AA male who presents with Hematemesis. This occurred 2 days ago, painless.  No hematochezia, did have melena. He was noted to have severely low hemoglobin needing transfusion. EGD showed small ulcer with active bleeding that needed endo clip placement. No more active bleeding after EGD. He has low hemoglobin since stroke in 2016. Around 7 to 9. Advised ferrous sulfate, pantoprazole for home. Needs re assessment to start aspirin in next 2 weeks. CBC also advised to be done in 2 weeks. Denied to have colonoscope done during admission     Significant therapeutic interventions: EGD:     Esophagus: normal     Stomach:    Fundus and Cardia Examined in Retroflexed View: normal    Body: abnormal: In the upper part of the stomach there is an ulcer with fresh bleed. The ulcer is buried in between the folds. There was also fresh blood present in the body of the stomach which was aspirated. Polaroid picture was taken.   I did apply 2 endoclips to the ulcer with perfect hemostasis.      No further oozing of fresh blood noted.       Antrum: normal     Duodenum:     Descending: normal    Bulb: normal    Significant Diagnostic Studies:   Xr Acute Abd Series Chest 1 Vw    Result Date: 12/21/2017  EXAMINATION: ACUTE ABDOMINAL SERIES WITH SINGLE  VIEW OF THE CHEST, 12/21/2017 2:50 am COMPARISON: None HISTORY: ORDERING SYSTEM PROVIDED HISTORY: abd pain, nv, gi bleed TECHNOLOGIST PROVIDED HISTORY: Reason for exam:->abd pain, nv, gi bleed Ordering Physician Provided Reason for Exam: vomiting blood Acuity: Unknown Type of Exam: Unknown FINDINGS: Medial right lung base discoid opacity. Otherwise, lungs are clear. Cardiomediastinal silhouette is within normal limits. No pneumothorax or significant pleural effusion. Degenerative changes of the spine and shoulders. Bowel gas pattern is nonspecific/nonobstructive. No pneumatosis, pneumobilia or evidence of free air. Cholecystectomy clips are present. Moderate volume of retained fecal material is present. Mild lumbar spondylosis. No acute osseous abnormality. No suspicious abdominal/pelvic calcifications. No acute findings. Right lung base subsegmental atelectasis or scarring. Consultations:    Consults:     Final Specialist Recommendations/Findings:   IP CONSULT TO INTERNAL MEDICINE  IP CONSULT TO GI      The patient was seen and examined on day of discharge and this discharge summary is in conjunction with any daily progress note from day of discharge. Discharge plan:     Disposition: Home    Physician Follow Up:    Nitin Hicks MD  [address] [place] [address]  [phone]    Schedule an appointment as soon as possible for a visit  As needed, If symptoms worsen    Yvette Drafts, [place], [address]  [address] [address]  [phone]    Schedule an appointment as soon as possible for a visit in 2 weeks  for biopsy follow-ups    CBC lab draw    Go to  First week of January       Requiring Further Evaluation/Follow Up POST HOSPITALIZATION/Incidental Findings: None    Diet: cardiac diet    Activity: As tolerated    Instructions to Patient: None    Discharge Medications:      Medication List      CHANGE how you take these medications    ferrous sulfate 325 (65 Fe) MG tablet  Take 1 tablet by mouth 3 times daily (with meals)  What changed:  when to take this        CONTINUE taking these medications    atorvastatin 80 MG tablet  Commonly known as:  LIPITOR  take 1 tablet by mouth once daily     metoprolol tartrate 25 MG tablet  Commonly known as:  LOPRESSOR  Take 0.5 tablets by mouth 2 times daily     ondansetron 8 MG tablet  Commonly known as:  ZOFRAN  Take 1 tablet by mouth every 8 hours as needed for Nausea or Vomiting     pantoprazole 40 MG tablet  Commonly known as:  PROTONIX  Take 1 tablet by mouth 2 times daily     vitamin B-12 500 MCG tablet  Commonly known as:  CYANOCOBALAMIN        STOP taking these medications    magnesium oxide 400 (241.3 Mg) MG Tabs tablet  Commonly known as:  MAG-OX     omeprazole 40 MG delayed release capsule  Commonly known as:  PRILOSEC           Where to Get Your Medications      You can get these medications from any pharmacy    Bring a paper prescription for each of these medications  · atorvastatin 80 MG tablet  · ferrous sulfate 325 (65 Fe) MG tablet  · metoprolol tartrate 25 MG tablet  · pantoprazole 40 MG tablet         Time Spent on discharge is  20 mins in patient examination, evaluation, counseling as well as medication reconciliation, prescriptions for required medications, discharge plan and follow up. Electronically signed by   Shoshana Hernandez MD  12/23/2017  4:12 PM      Thank you Dr. Collins Brock MD for the opportunity to be involved in this patient's care.

## 2017-12-23 NOTE — PLAN OF CARE
Problem: Falls - Risk of  Goal: Absence of falls  Outcome: Ongoing  Patient is fall risk per fall scale. Falling star on door. Fall sticker on armband. Hourly rounding performed. Personal belongings and call light within reach. Bed in low position.

## 2018-01-18 ENCOUNTER — TELEPHONE (OUTPATIENT)
Dept: INTERNAL MEDICINE | Age: 63
End: 2018-01-18

## 2018-01-26 ENCOUNTER — TELEPHONE (OUTPATIENT)
Dept: GASTROENTEROLOGY | Age: 63
End: 2018-01-26

## 2018-02-28 ENCOUNTER — TELEPHONE (OUTPATIENT)
Dept: GASTROENTEROLOGY | Age: 63
End: 2018-02-28

## 2018-03-16 RX ORDER — FERROUS SULFATE 325(65) MG
TABLET ORAL
Qty: 30 TABLET | Refills: 3 | Status: SHIPPED | OUTPATIENT
Start: 2018-03-16 | End: 2018-06-15 | Stop reason: SDUPTHER

## 2018-06-15 ENCOUNTER — OFFICE VISIT (OUTPATIENT)
Dept: INTERNAL MEDICINE | Age: 63
End: 2018-06-15
Payer: COMMERCIAL

## 2018-06-15 VITALS
HEART RATE: 88 BPM | DIASTOLIC BLOOD PRESSURE: 85 MMHG | SYSTOLIC BLOOD PRESSURE: 150 MMHG | BODY MASS INDEX: 29.18 KG/M2 | WEIGHT: 197 LBS | HEIGHT: 69 IN

## 2018-06-15 DIAGNOSIS — Z86.73 CEREBELLAR CEREBROVASCULAR ACCIDENT (CVA) WITHOUT LATE EFFECT: ICD-10-CM

## 2018-06-15 DIAGNOSIS — I10 BENIGN ESSENTIAL HTN: Primary | ICD-10-CM

## 2018-06-15 PROCEDURE — 4004F PT TOBACCO SCREEN RCVD TLK: CPT | Performed by: INTERNAL MEDICINE

## 2018-06-15 PROCEDURE — 99214 OFFICE O/P EST MOD 30 MIN: CPT | Performed by: INTERNAL MEDICINE

## 2018-06-15 PROCEDURE — 3017F COLORECTAL CA SCREEN DOC REV: CPT | Performed by: INTERNAL MEDICINE

## 2018-06-15 PROCEDURE — G8419 CALC BMI OUT NRM PARAM NOF/U: HCPCS | Performed by: INTERNAL MEDICINE

## 2018-06-15 PROCEDURE — G8427 DOCREV CUR MEDS BY ELIG CLIN: HCPCS | Performed by: INTERNAL MEDICINE

## 2018-06-15 PROCEDURE — G8599 NO ASA/ANTIPLAT THER USE RNG: HCPCS | Performed by: INTERNAL MEDICINE

## 2018-06-15 ASSESSMENT — PATIENT HEALTH QUESTIONNAIRE - PHQ9
1. LITTLE INTEREST OR PLEASURE IN DOING THINGS: 0
2. FEELING DOWN, DEPRESSED OR HOPELESS: 0
SUM OF ALL RESPONSES TO PHQ QUESTIONS 1-9: 0
SUM OF ALL RESPONSES TO PHQ9 QUESTIONS 1 & 2: 0

## 2018-07-03 RX ORDER — FERROUS SULFATE 325(65) MG
TABLET ORAL
Qty: 30 TABLET | Refills: 3 | Status: SHIPPED | OUTPATIENT
Start: 2018-07-03 | End: 2018-12-03 | Stop reason: SDUPTHER

## 2018-07-23 RX ORDER — ATORVASTATIN CALCIUM 80 MG/1
TABLET, FILM COATED ORAL
Qty: 30 TABLET | Refills: 3 | Status: SHIPPED | OUTPATIENT
Start: 2018-07-23 | End: 2018-12-03 | Stop reason: SDUPTHER

## 2018-07-23 NOTE — TELEPHONE ENCOUNTER
involving native heart without angina pectoris     Hyperglycemia     Hypokalemia     Severe anemia     Acute lower GI bleeding     Coronary artery disease involving native coronary artery of native heart without angina pectoris     Benign essential HTN     Acute gastric ulcer with hemorrhage     Gastrointestinal hemorrhage with hematemesis     AVM (arteriovenous malformation) of duodenum, acquired     History of colon polyps     Diverticulosis of colon     Adenomatous colon polyp     Hematemesis     Chronic fatigue     PUD (peptic ulcer disease)     Anemia     Diverticulosis of large intestine without hemorrhage     Arthritis of knee     Cerebellar cerebrovascular accident (CVA) without late effect (HCC)     Syncope and collapse     Multi-infarct state     Left homonymous hemianopsia     Occipital cerebral infarction ([address]     Visual impairment severe bilaterally     Hypotension due to blood loss     Acute ischemic stroke (HCC)     Hypotension     Symptomatic anemia     History of GI bleed     AVM (arteriovenous malformation)     Iron deficiency anemia     Prolonged Q-T interval on ECG

## 2018-09-11 NOTE — TELEPHONE ENCOUNTER
Next Visit Date:  Future Appointments  Date Time Provider [place] Elisabeth   9/17/2018 10:00 AM Brionna Aguilar MD [facility] [address] Maintenance   Topic Date Due    Hepatitis C screen  1955    HIV screen  04/04/1969    Pneumococcal med risk (1 of 1 - PPSV23) 04/04/1973    Shingles Vaccine (1 of 2 - 2 Dose Series) 04/04/2004    Low dose CT lung screening  04/04/2009    Colon Cancer Screen FIT/FOBT  03/31/2017    Flu vaccine (1) 09/01/2018    DTaP/Tdap/Td vaccine (1 - Tdap) 01/24/2019 (Originally 4/4/1973)    Potassium monitoring  12/22/2018    Creatinine monitoring  12/22/2018    Diabetes screen  12/08/2019    Lipid screen  12/09/2021       Hemoglobin A1C (%)   Date Value   12/08/2016 5.2   10/10/2016 5.3   12/16/2015 5.4             ( goal A1C is < 7)   No results found for: LABMICR  LDL Cholesterol (mg/dL)   Date Value   12/09/2016 57       (goal LDL is <100)   AST (U/L)   Date Value   12/22/2017 14     ALT (U/L)   Date Value   12/22/2017 12     BUN (mg/dL)   Date Value   12/22/2017 9     BP Readings from Last 3 Encounters:   06/15/18 (!) 150/85   12/23/17 (!) 98/59   12/22/17 (!) 112/59          (goal 120/80)    All Future Testing planned in [facility]  Lab Frequency Next Occurrence   CBC Once 01/04/2018   Hematocrit                 Patient Active Problem List:     Hypercholesteremia     GERD (gastroesophageal reflux disease)     Dizzy     Anemia     Epigastric pain     Upper GI bleed     Blood loss anemia     GI bleed     Sigmoid polyp     Gastritis     Acute blood loss anemia     Gastric ulcer     CKD (chronic kidney disease) stage 3, GFR 30-59 ml/min     Gastrointestinal hemorrhage     LEVI (acute kidney injury) ([address]     Mild malnutrition (HCC)     Smoker     Colon polyp     Essential hypertension     Coronary artery disease involving native heart without angina pectoris     Hyperglycemia     Hypokalemia     Severe anemia     Acute lower GI bleeding     Coronary artery disease

## 2018-11-07 ENCOUNTER — APPOINTMENT (OUTPATIENT)
Dept: CT IMAGING | Age: 63
End: 2018-11-07
Payer: MEDICARE

## 2018-11-07 ENCOUNTER — APPOINTMENT (OUTPATIENT)
Dept: GENERAL RADIOLOGY | Age: 63
End: 2018-11-07
Payer: MEDICARE

## 2018-11-07 ENCOUNTER — HOSPITAL ENCOUNTER (EMERGENCY)
Age: 63
Discharge: HOME | End: 2018-11-07
Attending: EMERGENCY MEDICINE
Payer: MEDICARE

## 2018-11-07 VITALS
WEIGHT: 185 LBS | HEART RATE: 81 BPM | HEIGHT: 69 IN | RESPIRATION RATE: 17 BRPM | SYSTOLIC BLOOD PRESSURE: 152 MMHG | OXYGEN SATURATION: 96 % | DIASTOLIC BLOOD PRESSURE: 86 MMHG | BODY MASS INDEX: 27.4 KG/M2 | TEMPERATURE: 97.5 F

## 2018-11-07 DIAGNOSIS — R51.9 ACUTE NONINTRACTABLE HEADACHE, UNSPECIFIED HEADACHE TYPE: Primary | ICD-10-CM

## 2018-11-07 DIAGNOSIS — T50.905A ADVERSE EFFECT OF DRUG, INITIAL ENCOUNTER: ICD-10-CM

## 2018-11-07 LAB
ABSOLUTE EOS #: 0.1 K/UL (ref 0–0.4)
ABSOLUTE IMMATURE GRANULOCYTE: ABNORMAL K/UL (ref 0–0.3)
ABSOLUTE LYMPH #: 0.9 K/UL (ref 1–4.8)
ABSOLUTE MONO #: 0.8 K/UL (ref 0.2–0.8)
ACETAMINOPHEN LEVEL: <10 UG/ML (ref 10–30)
ALBUMIN SERPL-MCNC: 4.1 G/DL (ref 3.5–5.2)
ALBUMIN/GLOBULIN RATIO: NORMAL (ref 1–2.5)
ALP BLD-CCNC: 105 U/L (ref 40–129)
ALT SERPL-CCNC: 17 U/L (ref 5–41)
ANION GAP SERPL CALCULATED.3IONS-SCNC: 13 MMOL/L (ref 9–17)
AST SERPL-CCNC: 19 U/L
BASOPHILS # BLD: 0 % (ref 0–2)
BASOPHILS ABSOLUTE: 0 K/UL (ref 0–0.2)
BILIRUB SERPL-MCNC: 0.78 MG/DL (ref 0.3–1.2)
BILIRUBIN DIRECT: 0.14 MG/DL
BILIRUBIN, INDIRECT: 0.64 MG/DL (ref 0–1)
BUN BLDV-MCNC: 8 MG/DL (ref 8–23)
BUN/CREAT BLD: 8 (ref 9–20)
CALCIUM SERPL-MCNC: 8.9 MG/DL (ref 8.6–10.4)
CHLORIDE BLD-SCNC: 100 MMOL/L (ref 98–107)
CO2: 26 MMOL/L (ref 20–31)
CREAT SERPL-MCNC: 0.97 MG/DL (ref 0.7–1.2)
DIFFERENTIAL TYPE: ABNORMAL
EKG ATRIAL RATE: 89 BPM
EKG P AXIS: 48 DEGREES
EKG P-R INTERVAL: 160 MS
EKG Q-T INTERVAL: 418 MS
EKG QRS DURATION: 96 MS
EKG QTC CALCULATION (BAZETT): 508 MS
EKG R AXIS: 11 DEGREES
EKG T AXIS: 32 DEGREES
EKG VENTRICULAR RATE: 89 BPM
EOSINOPHILS RELATIVE PERCENT: 1 % (ref 1–4)
ETHANOL PERCENT: <0.01 %
ETHANOL: <10 MG/DL
FIO2: 21
GFR AFRICAN AMERICAN: >60 ML/MIN
GFR NON-AFRICAN AMERICAN: >60 ML/MIN
GFR SERPL CREATININE-BSD FRML MDRD: ABNORMAL ML/MIN/{1.73_M2}
GFR SERPL CREATININE-BSD FRML MDRD: ABNORMAL ML/MIN/{1.73_M2}
GLOBULIN: NORMAL G/DL (ref 1.5–3.8)
GLUCOSE BLD-MCNC: 132 MG/DL (ref 70–99)
GLUCOSE BLD-MCNC: 132 MG/DL (ref 75–110)
HCT VFR BLD CALC: 44.3 % (ref 41–53)
HEMOGLOBIN: 14.7 G/DL (ref 13.5–17.5)
IMMATURE GRANULOCYTES: ABNORMAL %
INR BLD: 1
LYMPHOCYTES # BLD: 10 % (ref 24–44)
MCH RBC QN AUTO: 30.5 PG (ref 26–34)
MCHC RBC AUTO-ENTMCNC: 33.1 G/DL (ref 31–37)
MCV RBC AUTO: 92.1 FL (ref 80–100)
MONOCYTES # BLD: 9 % (ref 1–7)
MYOGLOBIN: 63 NG/ML (ref 28–72)
NEGATIVE BASE EXCESS, ART: ABNORMAL (ref 0–2)
NRBC AUTOMATED: ABNORMAL PER 100 WBC
O2 DEVICE/FLOW/%: ABNORMAL
PATIENT TEMP: 37
PDW BLD-RTO: 16.9 % (ref 11.5–14.5)
PLATELET # BLD: 226 K/UL (ref 130–400)
PLATELET ESTIMATE: ABNORMAL
PMV BLD AUTO: 7.8 FL (ref 6–12)
POC HCO3: 28.6 MMOL/L (ref 22–27)
POC O2 SATURATION: 89 %
POC PCO2 TEMP: ABNORMAL MM HG
POC PCO2: 50 MM HG (ref 32–45)
POC PH TEMP: ABNORMAL
POC PH: 7.37 (ref 7.35–7.45)
POC PO2 TEMP: ABNORMAL MM HG
POC PO2: 60 MM HG (ref 75–95)
POSITIVE BASE EXCESS, ART: 3 (ref 0–2)
POTASSIUM SERPL-SCNC: 4 MMOL/L (ref 3.7–5.3)
PROTHROMBIN TIME: 10.8 SEC (ref 9.7–11.6)
RBC # BLD: 4.81 M/UL (ref 4.5–5.9)
RBC # BLD: ABNORMAL 10*6/UL
SALICYLATE LEVEL: <1 MG/DL (ref 3–10)
SEG NEUTROPHILS: 80 % (ref 36–66)
SEGMENTED NEUTROPHILS ABSOLUTE COUNT: 7.1 K/UL (ref 1.8–7.7)
SODIUM BLD-SCNC: 139 MMOL/L (ref 135–144)
TCO2 (CALC), ART: 30 MMOL/L (ref 23–28)
TOTAL PROTEIN: 8 G/DL (ref 6.4–8.3)
TOXIC TRICYCLIC SC,BLOOD: NEGATIVE
TROPONIN INTERP: NORMAL
TROPONIN T: <0.03 NG/ML
TSH SERPL DL<=0.05 MIU/L-ACNC: 1.39 MIU/L (ref 0.3–5)
WBC # BLD: 8.9 K/UL (ref 3.5–11)
WBC # BLD: ABNORMAL 10*3/UL

## 2018-11-07 PROCEDURE — 93005 ELECTROCARDIOGRAM TRACING: CPT

## 2018-11-07 PROCEDURE — 85610 PROTHROMBIN TIME: CPT

## 2018-11-07 PROCEDURE — 71045 X-RAY EXAM CHEST 1 VIEW: CPT

## 2018-11-07 PROCEDURE — 99284 EMERGENCY DEPT VISIT MOD MDM: CPT

## 2018-11-07 PROCEDURE — 83874 ASSAY OF MYOGLOBIN: CPT

## 2018-11-07 PROCEDURE — 6360000002 HC RX W HCPCS: Performed by: NURSE PRACTITIONER

## 2018-11-07 PROCEDURE — 85025 COMPLETE CBC W/AUTO DIFF WBC: CPT

## 2018-11-07 PROCEDURE — 80307 DRUG TEST PRSMV CHEM ANLYZR: CPT

## 2018-11-07 PROCEDURE — 70450 CT HEAD/BRAIN W/O DYE: CPT

## 2018-11-07 PROCEDURE — 82803 BLOOD GASES ANY COMBINATION: CPT

## 2018-11-07 PROCEDURE — 80048 BASIC METABOLIC PNL TOTAL CA: CPT

## 2018-11-07 PROCEDURE — 80076 HEPATIC FUNCTION PANEL: CPT

## 2018-11-07 PROCEDURE — 82947 ASSAY GLUCOSE BLOOD QUANT: CPT

## 2018-11-07 PROCEDURE — 84443 ASSAY THYROID STIM HORMONE: CPT

## 2018-11-07 PROCEDURE — 96374 THER/PROPH/DIAG INJ IV PUSH: CPT

## 2018-11-07 PROCEDURE — 36600 WITHDRAWAL OF ARTERIAL BLOOD: CPT

## 2018-11-07 PROCEDURE — 84484 ASSAY OF TROPONIN QUANT: CPT

## 2018-11-07 PROCEDURE — G0480 DRUG TEST DEF 1-7 CLASSES: HCPCS

## 2018-11-07 RX ORDER — ONDANSETRON 2 MG/ML
4 INJECTION INTRAMUSCULAR; INTRAVENOUS ONCE
Status: COMPLETED | OUTPATIENT
Start: 2018-11-07 | End: 2018-11-07

## 2018-11-07 RX ADMIN — ONDANSETRON 4 MG: 2 INJECTION, SOLUTION INTRAMUSCULAR; INTRAVENOUS at 13:44

## 2018-11-07 ASSESSMENT — ENCOUNTER SYMPTOMS
NAUSEA: 0
WHEEZING: 0
VOMITING: 0
ABDOMINAL PAIN: 0
SORE THROAT: 0
RHINORRHEA: 0
CONSTIPATION: 0
SHORTNESS OF BREATH: 0
COUGH: 0
COLOR CHANGE: 0
DIARRHEA: 0
SINUS PRESSURE: 0

## 2018-11-07 ASSESSMENT — PAIN SCALES - GENERAL: PAINLEVEL_OUTOF10: 8

## 2018-11-07 NOTE — ED PROVIDER NOTES
eMERGENCY dEPARTMENT eNCOUnter   Attending Attestation     Pt Name: Estefani Romero  MRN: [id number]  [place] 1955  Date of evaluation: 11/7/18   Estefani Romero is a 58 y.o. male with CC: Headache    MDM:   Presents for altered mental status after taking a pain medication. Patient had a right-sided headache. Took a pain med, and developed altered mental status with generalized weakness. Nursing note states he took and OTC medication. Patient and wife state that the medication likely contained an opioid. No focal findings of stroke. Initially presented altered. On my exam after ED w/u, patient is alert. No focal deficits. No facial asymmetry. Negative for pronator drift. CTAB. RRR. I reviewed lab studies. Normal hemoglobin. Negative troponin. Unremarkable electrolytes. I reviewed imaging studies. Unremarkable CT Head. CXR findings of CHF. Wife states patient has h/o related issues. Old records show EF of 45 - 50%. Patient has been ambulatory. Comfortable going home. Suspects medication reaction. Agrees to avoid in the future. Educated both on s/s stroke. Return immediately for any concerns. CRITICAL CARE:       EKG: All EKG's are interpreted by the Emergency Department Physician who either signs or Co-signs this chart in the absence of a cardiologist.      RADIOLOGY:All plain film, CT, MRI, and formal ultrasound images (except ED bedside ultrasound) are read by the radiologist, see reports below, unless otherwise noted in MDM or here. XR CHEST PORTABLE   Final Result   Bilateral airspace disease and small effusions, consistent with edema. CT Head WO Contrast   Final Result   1. No acute intracranial process identified. 2. Remote bilateral cerebellar and bilateral occipital lobe infarcts. The above findings were discussed with Dr. Florencio Mcgovern at 2 p.m. on 11/07/2018. LABS: All lab results were reviewed by myself, and all abnormals are listed below.   Labs Reviewed   CBC WITH AUTO DIFFERENTIAL - Abnormal; Notable for the following:        Result Value    RDW 16.9 (*)     Seg Neutrophils 80 (*)     Lymphocytes 10 (*)     Monocytes 9 (*)     Absolute Lymph # 0.90 (*)     All other components within normal limits   BASIC METABOLIC PANEL - Abnormal; Notable for the following:     Glucose 132 (*)     Bun/Cre Ratio 8 (*)     All other components within normal limits   TOX SCR, BLD, ED - Abnormal; Notable for the following:     Salicylate Lvl <1 (*)     Acetaminophen Level <10 (*)     All other components within normal limits   POC PANEL (G3)-ART - Abnormal; Notable for the following:     POC pCO2 50 (*)     POC PO2 60 (*)     TCO2 (calc), Art 30 (*)     POC HCO3 28.6 (*)     Positive Base Excess, Art 3 (*)     All other components within normal limits   POC GLUCOSE FINGERSTICK - Abnormal; Notable for the following:     POC Glucose 132 (*)     All other components within normal limits   TROP/MYOGLOBIN   PROTIME-INR   TSH WITH REFLEX   HEPATIC FUNCTION PANEL   URINE DRUG SCREEN   URINE RT REFLEX TO CULTURE           I personally evaluated and examined the patient in conjunction with the APC and agree with the assessment, treatment plan, and disposition of the patient as recorded by the APC.    Dorothy Barkley MD  Attending Emergency Physician          Stephane Christianson MD  11/07/18 6027

## 2018-12-03 ENCOUNTER — OFFICE VISIT (OUTPATIENT)
Dept: PRIMARY CARE CLINIC | Age: 63
End: 2018-12-03
Payer: MEDICARE

## 2018-12-03 ENCOUNTER — HOSPITAL ENCOUNTER (OUTPATIENT)
Age: 63
Discharge: HOME | End: 2018-12-03
Payer: MEDICARE

## 2018-12-03 VITALS
HEIGHT: 69 IN | WEIGHT: 190 LBS | TEMPERATURE: 97.2 F | RESPIRATION RATE: 20 BRPM | DIASTOLIC BLOOD PRESSURE: 96 MMHG | BODY MASS INDEX: 28.14 KG/M2 | HEART RATE: 50 BPM | SYSTOLIC BLOOD PRESSURE: 147 MMHG | OXYGEN SATURATION: 96 %

## 2018-12-03 DIAGNOSIS — E44.1 MILD MALNUTRITION (HCC): ICD-10-CM

## 2018-12-03 DIAGNOSIS — Z79.899 CURRENT USE OF PROTON PUMP INHIBITOR: ICD-10-CM

## 2018-12-03 DIAGNOSIS — Z71.89 COUNSELING FOR LIVING WILL: ICD-10-CM

## 2018-12-03 DIAGNOSIS — R41.3 MEMORY LOSS OF UNKNOWN CAUSE: ICD-10-CM

## 2018-12-03 DIAGNOSIS — R41.3 MEMORY LOSS OF UNKNOWN CAUSE: Primary | ICD-10-CM

## 2018-12-03 LAB
FOLATE: 9.9 NG/ML
MAGNESIUM: 2.1 MG/DL (ref 1.6–2.6)
VITAMIN B-12: 183 PG/ML (ref 232–1245)

## 2018-12-03 PROCEDURE — 82746 ASSAY OF FOLIC ACID SERUM: CPT

## 2018-12-03 PROCEDURE — 36415 COLL VENOUS BLD VENIPUNCTURE: CPT

## 2018-12-03 PROCEDURE — 99213 OFFICE O/P EST LOW 20 MIN: CPT | Performed by: NURSE PRACTITIONER

## 2018-12-03 PROCEDURE — 82607 VITAMIN B-12: CPT

## 2018-12-03 PROCEDURE — 83735 ASSAY OF MAGNESIUM: CPT

## 2018-12-03 RX ORDER — FERROUS SULFATE 325(65) MG
TABLET ORAL
Qty: 30 TABLET | Refills: 3 | Status: SHIPPED | OUTPATIENT
Start: 2018-12-03 | End: 2019-08-30 | Stop reason: ALTCHOICE

## 2018-12-03 RX ORDER — OMEPRAZOLE 20 MG/1
20 CAPSULE, DELAYED RELEASE ORAL DAILY
Status: ON HOLD | COMMUNITY
End: 2020-07-12 | Stop reason: HOSPADM

## 2018-12-03 RX ORDER — ATORVASTATIN CALCIUM 80 MG/1
TABLET, FILM COATED ORAL
Qty: 30 TABLET | Refills: 3 | Status: ON HOLD | OUTPATIENT
Start: 2018-12-03 | End: 2019-01-25

## 2018-12-03 ASSESSMENT — ENCOUNTER SYMPTOMS
VISUAL CHANGE: 0
CHEST TIGHTNESS: 0

## 2018-12-04 ENCOUNTER — TELEPHONE (OUTPATIENT)
Dept: PRIMARY CARE CLINIC | Age: 63
End: 2018-12-04

## 2018-12-04 DIAGNOSIS — E53.8 VITAMIN B12 DEFICIENCY (NON ANEMIC): Primary | ICD-10-CM

## 2018-12-04 RX ORDER — LANOLIN ALCOHOL/MO/W.PET/CERES
1000 CREAM (GRAM) TOPICAL DAILY
Qty: 30 TABLET | Refills: 1 | Status: SHIPPED | OUTPATIENT
Start: 2018-12-04 | End: 2019-02-01 | Stop reason: SDUPTHER

## 2018-12-31 ENCOUNTER — OFFICE VISIT (OUTPATIENT)
Dept: PRIMARY CARE CLINIC | Age: 63
End: 2018-12-31
Payer: MEDICARE

## 2018-12-31 VITALS
HEIGHT: 69 IN | DIASTOLIC BLOOD PRESSURE: 84 MMHG | SYSTOLIC BLOOD PRESSURE: 128 MMHG | BODY MASS INDEX: 27.7 KG/M2 | HEART RATE: 84 BPM | WEIGHT: 187 LBS

## 2018-12-31 DIAGNOSIS — Z23 NEED FOR TDAP VACCINATION: ICD-10-CM

## 2018-12-31 DIAGNOSIS — R41.3 MEMORY LOSS: ICD-10-CM

## 2018-12-31 DIAGNOSIS — E53.8 VITAMIN B 12 DEFICIENCY: Primary | ICD-10-CM

## 2018-12-31 PROCEDURE — G8484 FLU IMMUNIZE NO ADMIN: HCPCS | Performed by: NURSE PRACTITIONER

## 2018-12-31 PROCEDURE — G8599 NO ASA/ANTIPLAT THER USE RNG: HCPCS | Performed by: NURSE PRACTITIONER

## 2018-12-31 PROCEDURE — 90715 TDAP VACCINE 7 YRS/> IM: CPT | Performed by: NURSE PRACTITIONER

## 2018-12-31 PROCEDURE — G8419 CALC BMI OUT NRM PARAM NOF/U: HCPCS | Performed by: NURSE PRACTITIONER

## 2018-12-31 PROCEDURE — 99213 OFFICE O/P EST LOW 20 MIN: CPT | Performed by: NURSE PRACTITIONER

## 2018-12-31 PROCEDURE — G8427 DOCREV CUR MEDS BY ELIG CLIN: HCPCS | Performed by: NURSE PRACTITIONER

## 2018-12-31 PROCEDURE — 90471 IMMUNIZATION ADMIN: CPT | Performed by: NURSE PRACTITIONER

## 2018-12-31 PROCEDURE — 4004F PT TOBACCO SCREEN RCVD TLK: CPT | Performed by: NURSE PRACTITIONER

## 2018-12-31 PROCEDURE — 3017F COLORECTAL CA SCREEN DOC REV: CPT | Performed by: NURSE PRACTITIONER

## 2018-12-31 NOTE — PROGRESS NOTES
Hypertension Visit Information    BP Readings from Last 3 Encounters:   12/03/18 (!) 147/96   11/07/18 (!) 152/86   06/15/18 (!) 150/85       LDL Cholesterol (mg/dL)   Date Value   12/09/2016 57     HDL (mg/dL)   Date Value   12/09/2016 17 (L)     BUN (mg/dL)   Date Value   11/07/2018 8     CREATININE (mg/dL)   Date Value   11/07/2018 0.97     Glucose (mg/dL)   Date Value   11/07/2018 132 (H)   09/20/2011 116 (H)              Have you changed or started any medications since your last visit including any over-the-counter medicines, vitamins, or herbal medicines? no   Are you having any side effects from any of your medications? -  no  Have you stopped taking any of your medications? Is so, why? -  no    Have you seen any other physician or provider since your last visit? No  Have you had any other diagnostic tests since your last visit? Yes - Records Obtained, Pt had labs done on 12/3/18  Have you been seen in the emergency room and/or had an admission to a hospital since we last saw you? No  Have you had your routine dental cleaning in the past 6 months? no    Have you activated your [employer] account? If not, what are your barriers?  Yes     Patient Care Team:  CESAR Massey - CNP as PCP - General (Family Medicine)  Luis Lr MD as Consulting Physician (Gastroenterology)  Makenna Kidd MD as Consulting Physician (Hematology and Oncology)    Medical History Review  Past Medical, Family, and Social History reviewed and does contribute to the patient presenting condition    Health Maintenance   Topic Date Due    Hepatitis C screen  1955    HIV screen  04/04/1969    Low dose CT lung screening  04/04/2009    DTaP/Tdap/Td vaccine (1 - Tdap) 01/24/2019 (Originally 4/4/1973)    Flu vaccine (1) 09/01/2019 (Originally 9/1/2018)    Pneumococcal med risk (1 of 1 - PPSV23) 09/01/2019 (Originally 4/4/1973)    Shingles Vaccine (1 of 2 - 2 Dose Series) 09/01/2019 (Originally 4/4/2004)    Colon cancer
Hypertensive end-organ damage includes CAD/MI and CVA. [place] Review of Systems   Cardiovascular: Negative for chest pain. Neurological: Positive for focal weakness. Negative for dizziness and syncope. Psychiatric/Behavioral: Positive for confusion and memory loss. Prior to Visit Medications    Medication Sig Taking? Authorizing Provider   vitamin B-12 (CYANOCOBALAMIN) 1000 MCG tablet Take 1 tablet by mouth daily Yes Diego Spatz, APRN - CNP   omeprazole (PRILOSEC) 20 MG delayed release capsule Take 20 mg by mouth daily Yes Historical Provider, MD   atorvastatin (LIPITOR) 80 MG tablet take 1 tablet by mouth once daily Yes Diego Spatz, APRN - CNP   magnesium oxide (MAG-OX) 400 (241.3 Mg) MG TABS tablet take 1 tablet by mouth once daily Yes Diego Spatz, APRN - CNP   ferrous sulfate 325 (65 Fe) MG tablet take 1 tablet by mouth once daily Yes Diego Spatz, APRN - CNP   metoprolol tartrate (LOPRESSOR) 25 MG tablet take 1/2 tablet by mouth twice a day Yes Kvng Villafuerte MD   ondansetron (ZOFRAN) 8 MG tablet Take 1 tablet by mouth every 8 hours as needed for Nausea or Vomiting Yes Kvng Villafuerte MD        Social History   Substance Use Topics    Smoking status: Current Every Day Smoker     Packs/day: 1.00     Years: 30.00     Types: Cigarettes    Smokeless tobacco: Never Used    Alcohol use Yes      Comment: q3-4 months        Vitals:    12/31/18 0932   BP: 128/84   Site: Right Upper Arm   Position: Sitting   Cuff Size: Medium Adult   Pulse: 84   Weight: 187 lb (84.8 kg)   Height: 5' 9\" (1.753 m)     Estimated body mass index is 27.62 kg/m² as calculated from the following:    Height as of this encounter: 5' 9\" (1.753 m). Weight as of this encounter: 187 lb (84.8 kg). Lab Results   Component Value Date    [id number] 183 (L) 12/03/2018     Lab Results   Component Value Date    MG 2.1 12/03/2018     No results for input(s): WBC, HGB, HCT, MCV, PLT in the last 720 hours.     Physical

## 2019-01-23 ENCOUNTER — APPOINTMENT (OUTPATIENT)
Dept: CT IMAGING | Age: 64
DRG: 066 | End: 2019-01-23
Payer: MEDICARE

## 2019-01-23 ENCOUNTER — HOSPITAL ENCOUNTER (INPATIENT)
Age: 64
LOS: 2 days | Discharge: HOME | DRG: 066 | End: 2019-01-25
Attending: EMERGENCY MEDICINE | Admitting: INTERNAL MEDICINE
Payer: MEDICARE

## 2019-01-23 DIAGNOSIS — I63.9 OCCIPITAL STROKE (HCC): Primary | ICD-10-CM

## 2019-01-23 LAB
ABSOLUTE EOS #: 0 K/UL (ref 0–0.4)
ABSOLUTE IMMATURE GRANULOCYTE: ABNORMAL K/UL (ref 0–0.3)
ABSOLUTE LYMPH #: 1 K/UL (ref 1–4.8)
ABSOLUTE MONO #: 0.7 K/UL (ref 0.2–0.8)
ALBUMIN SERPL-MCNC: 3.5 G/DL (ref 3.5–5.2)
ALBUMIN/GLOBULIN RATIO: ABNORMAL (ref 1–2.5)
ALP BLD-CCNC: 90 U/L (ref 40–129)
ALT SERPL-CCNC: 12 U/L (ref 5–41)
ANION GAP SERPL CALCULATED.3IONS-SCNC: 13 MMOL/L (ref 9–17)
AST SERPL-CCNC: 13 U/L
BASOPHILS # BLD: 1 % (ref 0–2)
BASOPHILS ABSOLUTE: 0.1 K/UL (ref 0–0.2)
BILIRUB SERPL-MCNC: 0.68 MG/DL (ref 0.3–1.2)
BUN BLDV-MCNC: 11 MG/DL (ref 8–23)
BUN/CREAT BLD: 10 (ref 9–20)
CALCIUM SERPL-MCNC: 8.4 MG/DL (ref 8.6–10.4)
CHLORIDE BLD-SCNC: 102 MMOL/L (ref 98–107)
CO2: 24 MMOL/L (ref 20–31)
CREAT SERPL-MCNC: 1.06 MG/DL (ref 0.7–1.2)
DIFFERENTIAL TYPE: ABNORMAL
EKG ATRIAL RATE: 70 BPM
EKG P AXIS: 57 DEGREES
EKG P-R INTERVAL: 162 MS
EKG Q-T INTERVAL: 408 MS
EKG QRS DURATION: 92 MS
EKG QTC CALCULATION (BAZETT): 440 MS
EKG R AXIS: 42 DEGREES
EKG T AXIS: 4 DEGREES
EKG VENTRICULAR RATE: 70 BPM
EOSINOPHILS RELATIVE PERCENT: 0 % (ref 1–4)
GFR AFRICAN AMERICAN: >60 ML/MIN
GFR NON-AFRICAN AMERICAN: >60 ML/MIN
GFR SERPL CREATININE-BSD FRML MDRD: ABNORMAL ML/MIN/{1.73_M2}
GFR SERPL CREATININE-BSD FRML MDRD: ABNORMAL ML/MIN/{1.73_M2}
GLUCOSE BLD-MCNC: 103 MG/DL (ref 70–99)
GLUCOSE BLD-MCNC: 109 MG/DL (ref 75–110)
HCT VFR BLD CALC: 45.1 % (ref 41–53)
HEMOGLOBIN: 14.8 G/DL (ref 13.5–17.5)
IMMATURE GRANULOCYTES: ABNORMAL %
INR BLD: 1.1
LYMPHOCYTES # BLD: 12 % (ref 24–44)
MAGNESIUM: 2 MG/DL (ref 1.6–2.6)
MCH RBC QN AUTO: 30.2 PG (ref 26–34)
MCHC RBC AUTO-ENTMCNC: 32.9 G/DL (ref 31–37)
MCV RBC AUTO: 91.8 FL (ref 80–100)
MONOCYTES # BLD: 8 % (ref 1–7)
NRBC AUTOMATED: ABNORMAL PER 100 WBC
PARTIAL THROMBOPLASTIN TIME: 28.9 SEC (ref 23–31)
PDW BLD-RTO: 16.4 % (ref 11.5–14.5)
PLATELET # BLD: 220 K/UL (ref 130–400)
PLATELET ESTIMATE: ABNORMAL
PMV BLD AUTO: 8.4 FL (ref 6–12)
POTASSIUM SERPL-SCNC: 3.9 MMOL/L (ref 3.7–5.3)
PROTHROMBIN TIME: 11.6 SEC (ref 9.7–11.6)
RBC # BLD: 4.91 M/UL (ref 4.5–5.9)
RBC # BLD: ABNORMAL 10*6/UL
SEDIMENTATION RATE, ERYTHROCYTE: 5 MM (ref 0–15)
SEG NEUTROPHILS: 79 % (ref 36–66)
SEGMENTED NEUTROPHILS ABSOLUTE COUNT: 6.8 K/UL (ref 1.8–7.7)
SODIUM BLD-SCNC: 139 MMOL/L (ref 135–144)
TOTAL PROTEIN: 7.1 G/DL (ref 6.4–8.3)
TROPONIN INTERP: NORMAL
TROPONIN T: NORMAL NG/ML
TROPONIN, HIGH SENSITIVITY: <6 NG/L (ref 0–22)
TSH SERPL DL<=0.05 MIU/L-ACNC: 1.34 MIU/L (ref 0.3–5)
WBC # BLD: 8.6 K/UL (ref 3.5–11)
WBC # BLD: ABNORMAL 10*3/UL

## 2019-01-23 PROCEDURE — 99285 EMERGENCY DEPT VISIT HI MDM: CPT

## 2019-01-23 PROCEDURE — 6360000002 HC RX W HCPCS: Performed by: EMERGENCY MEDICINE

## 2019-01-23 PROCEDURE — 2580000003 HC RX 258: Performed by: INTERNAL MEDICINE

## 2019-01-23 PROCEDURE — 85651 RBC SED RATE NONAUTOMATED: CPT

## 2019-01-23 PROCEDURE — 6360000002 HC RX W HCPCS: Performed by: INTERNAL MEDICINE

## 2019-01-23 PROCEDURE — 84443 ASSAY THYROID STIM HORMONE: CPT

## 2019-01-23 PROCEDURE — 83735 ASSAY OF MAGNESIUM: CPT

## 2019-01-23 PROCEDURE — 6370000000 HC RX 637 (ALT 250 FOR IP): Performed by: INTERNAL MEDICINE

## 2019-01-23 PROCEDURE — 99223 1ST HOSP IP/OBS HIGH 75: CPT | Performed by: INTERNAL MEDICINE

## 2019-01-23 PROCEDURE — 85025 COMPLETE CBC W/AUTO DIFF WBC: CPT

## 2019-01-23 PROCEDURE — 70496 CT ANGIOGRAPHY HEAD: CPT

## 2019-01-23 PROCEDURE — 2580000003 HC RX 258: Performed by: EMERGENCY MEDICINE

## 2019-01-23 PROCEDURE — 85730 THROMBOPLASTIN TIME PARTIAL: CPT

## 2019-01-23 PROCEDURE — 80053 COMPREHEN METABOLIC PANEL: CPT

## 2019-01-23 PROCEDURE — 93005 ELECTROCARDIOGRAM TRACING: CPT

## 2019-01-23 PROCEDURE — 84484 ASSAY OF TROPONIN QUANT: CPT

## 2019-01-23 PROCEDURE — 70498 CT ANGIOGRAPHY NECK: CPT

## 2019-01-23 PROCEDURE — 1200000000 HC SEMI PRIVATE

## 2019-01-23 PROCEDURE — 70450 CT HEAD/BRAIN W/O DYE: CPT

## 2019-01-23 PROCEDURE — 96374 THER/PROPH/DIAG INJ IV PUSH: CPT

## 2019-01-23 PROCEDURE — 85610 PROTHROMBIN TIME: CPT

## 2019-01-23 PROCEDURE — 6360000004 HC RX CONTRAST MEDICATION: Performed by: EMERGENCY MEDICINE

## 2019-01-23 PROCEDURE — 82947 ASSAY GLUCOSE BLOOD QUANT: CPT

## 2019-01-23 RX ORDER — CLOPIDOGREL BISULFATE 75 MG/1
75 TABLET ORAL DAILY
Status: DISCONTINUED | OUTPATIENT
Start: 2019-01-23 | End: 2019-01-24

## 2019-01-23 RX ORDER — SODIUM CHLORIDE 0.9 % (FLUSH) 0.9 %
10 SYRINGE (ML) INJECTION PRN
Status: DISCONTINUED | OUTPATIENT
Start: 2019-01-23 | End: 2019-01-23

## 2019-01-23 RX ORDER — ONDANSETRON 2 MG/ML
4 INJECTION INTRAMUSCULAR; INTRAVENOUS ONCE
Status: COMPLETED | OUTPATIENT
Start: 2019-01-23 | End: 2019-01-23

## 2019-01-23 RX ORDER — ATORVASTATIN CALCIUM 80 MG/1
80 TABLET, FILM COATED ORAL NIGHTLY
Status: DISCONTINUED | OUTPATIENT
Start: 2019-01-23 | End: 2019-01-25 | Stop reason: HOSPADM

## 2019-01-23 RX ORDER — SODIUM CHLORIDE 0.9 % (FLUSH) 0.9 %
10 SYRINGE (ML) INJECTION EVERY 12 HOURS SCHEDULED
Status: DISCONTINUED | OUTPATIENT
Start: 2019-01-23 | End: 2019-01-25 | Stop reason: HOSPADM

## 2019-01-23 RX ORDER — ACETAMINOPHEN 325 MG/1
650 TABLET ORAL EVERY 4 HOURS PRN
Status: DISCONTINUED | OUTPATIENT
Start: 2019-01-23 | End: 2019-01-25 | Stop reason: HOSPADM

## 2019-01-23 RX ORDER — SODIUM CHLORIDE 9 MG/ML
INJECTION, SOLUTION INTRAVENOUS CONTINUOUS
Status: DISCONTINUED | OUTPATIENT
Start: 2019-01-23 | End: 2019-01-25 | Stop reason: HOSPADM

## 2019-01-23 RX ORDER — ONDANSETRON 2 MG/ML
4 INJECTION INTRAMUSCULAR; INTRAVENOUS EVERY 6 HOURS PRN
Status: DISCONTINUED | OUTPATIENT
Start: 2019-01-23 | End: 2019-01-25 | Stop reason: HOSPADM

## 2019-01-23 RX ORDER — 0.9 % SODIUM CHLORIDE 0.9 %
80 INTRAVENOUS SOLUTION INTRAVENOUS ONCE
Status: COMPLETED | OUTPATIENT
Start: 2019-01-23 | End: 2019-01-23

## 2019-01-23 RX ORDER — ONDANSETRON 2 MG/ML
4 INJECTION INTRAMUSCULAR; INTRAVENOUS EVERY 6 HOURS PRN
Status: DISCONTINUED | OUTPATIENT
Start: 2019-01-23 | End: 2019-01-23 | Stop reason: SDUPTHER

## 2019-01-23 RX ORDER — CLOPIDOGREL BISULFATE 75 MG/1
300 TABLET ORAL ONCE
Status: DISCONTINUED | OUTPATIENT
Start: 2019-01-23 | End: 2019-01-23

## 2019-01-23 RX ORDER — MORPHINE SULFATE 4 MG/ML
4 INJECTION, SOLUTION INTRAMUSCULAR; INTRAVENOUS ONCE
Status: COMPLETED | OUTPATIENT
Start: 2019-01-23 | End: 2019-01-23

## 2019-01-23 RX ORDER — BISACODYL 10 MG
10 SUPPOSITORY, RECTAL RECTAL DAILY PRN
Status: DISCONTINUED | OUTPATIENT
Start: 2019-01-23 | End: 2019-01-25 | Stop reason: HOSPADM

## 2019-01-23 RX ORDER — ONDANSETRON 4 MG/1
4 TABLET, ORALLY DISINTEGRATING ORAL EVERY 6 HOURS PRN
Status: DISCONTINUED | OUTPATIENT
Start: 2019-01-23 | End: 2019-01-25 | Stop reason: HOSPADM

## 2019-01-23 RX ORDER — SODIUM CHLORIDE 0.9 % (FLUSH) 0.9 %
10 SYRINGE (ML) INJECTION PRN
Status: DISCONTINUED | OUTPATIENT
Start: 2019-01-23 | End: 2019-01-25 | Stop reason: HOSPADM

## 2019-01-23 RX ADMIN — SODIUM CHLORIDE: 9 INJECTION, SOLUTION INTRAVENOUS at 22:17

## 2019-01-23 RX ADMIN — IOPAMIDOL 75 ML: 755 INJECTION, SOLUTION INTRAVENOUS at 15:28

## 2019-01-23 RX ADMIN — Medication 10 ML: at 15:28

## 2019-01-23 RX ADMIN — MORPHINE SULFATE 4 MG: 4 INJECTION, SOLUTION INTRAMUSCULAR; INTRAVENOUS at 15:41

## 2019-01-23 RX ADMIN — ENOXAPARIN SODIUM 40 MG: 40 INJECTION SUBCUTANEOUS at 19:10

## 2019-01-23 RX ADMIN — CLOPIDOGREL BISULFATE 75 MG: 75 TABLET ORAL at 19:10

## 2019-01-23 RX ADMIN — ATORVASTATIN CALCIUM 80 MG: 80 TABLET, FILM COATED ORAL at 22:17

## 2019-01-23 RX ADMIN — SODIUM CHLORIDE 80 ML: 9 INJECTION, SOLUTION INTRAVENOUS at 15:28

## 2019-01-23 RX ADMIN — ONDANSETRON HYDROCHLORIDE 4 MG: 2 INJECTION, SOLUTION INTRAVENOUS at 15:49

## 2019-01-23 ASSESSMENT — ENCOUNTER SYMPTOMS
SHORTNESS OF BREATH: 0
VOMITING: 0
VISUAL CHANGE: 1
NAUSEA: 0
TROUBLE SWALLOWING: 0

## 2019-01-23 ASSESSMENT — PAIN SCALES - GENERAL: PAINLEVEL_OUTOF10: 7

## 2019-01-23 ASSESSMENT — PAIN DESCRIPTION - LOCATION: LOCATION: HEAD

## 2019-01-24 ENCOUNTER — APPOINTMENT (OUTPATIENT)
Dept: MRI IMAGING | Age: 64
DRG: 066 | End: 2019-01-24
Payer: MEDICARE

## 2019-01-24 PROBLEM — H53.9 VISUAL DISTURBANCE: Status: ACTIVE | Noted: 2019-01-24

## 2019-01-24 LAB
ALBUMIN SERPL-MCNC: 3.4 G/DL (ref 3.5–5.2)
ALBUMIN/GLOBULIN RATIO: ABNORMAL (ref 1–2.5)
ALP BLD-CCNC: 88 U/L (ref 40–129)
ALT SERPL-CCNC: 11 U/L (ref 5–41)
ANION GAP SERPL CALCULATED.3IONS-SCNC: 12 MMOL/L (ref 9–17)
AST SERPL-CCNC: 11 U/L
BILIRUB SERPL-MCNC: 0.86 MG/DL (ref 0.3–1.2)
BUN BLDV-MCNC: 11 MG/DL (ref 8–23)
BUN/CREAT BLD: 10 (ref 9–20)
CALCIUM SERPL-MCNC: 8.2 MG/DL (ref 8.6–10.4)
CHLORIDE BLD-SCNC: 107 MMOL/L (ref 98–107)
CHOLESTEROL/HDL RATIO: 5.1
CHOLESTEROL: 107 MG/DL
CO2: 24 MMOL/L (ref 20–31)
CREAT SERPL-MCNC: 1.05 MG/DL (ref 0.7–1.2)
FOLATE: 13.4 NG/ML
GFR AFRICAN AMERICAN: >60 ML/MIN
GFR NON-AFRICAN AMERICAN: >60 ML/MIN
GFR SERPL CREATININE-BSD FRML MDRD: ABNORMAL ML/MIN/{1.73_M2}
GFR SERPL CREATININE-BSD FRML MDRD: ABNORMAL ML/MIN/{1.73_M2}
GLUCOSE BLD-MCNC: 102 MG/DL (ref 70–99)
HCT VFR BLD CALC: 44.8 % (ref 41–53)
HDLC SERPL-MCNC: 21 MG/DL
HEMOGLOBIN: 14.9 G/DL (ref 13.5–17.5)
HOMOCYSTEINE: 10.9 UMOL/L
LDL CHOLESTEROL: 54 MG/DL (ref 0–130)
LV EF: 45 %
LVEF MODALITY: NORMAL
MCH RBC QN AUTO: 30.3 PG (ref 26–34)
MCHC RBC AUTO-ENTMCNC: 33.2 G/DL (ref 31–37)
MCV RBC AUTO: 91.1 FL (ref 80–100)
NRBC AUTOMATED: ABNORMAL PER 100 WBC
PDW BLD-RTO: 16.5 % (ref 11.5–14.5)
PLATELET # BLD: 201 K/UL (ref 130–400)
PMV BLD AUTO: 8.6 FL (ref 6–12)
POTASSIUM SERPL-SCNC: 3.9 MMOL/L (ref 3.7–5.3)
RBC # BLD: 4.92 M/UL (ref 4.5–5.9)
SODIUM BLD-SCNC: 143 MMOL/L (ref 135–144)
TOTAL PROTEIN: 6.8 G/DL (ref 6.4–8.3)
TRIGL SERPL-MCNC: 159 MG/DL
VITAMIN B-12: 314 PG/ML (ref 232–1245)
VLDLC SERPL CALC-MCNC: ABNORMAL MG/DL (ref 1–30)
WBC # BLD: 7.6 K/UL (ref 3.5–11)

## 2019-01-24 PROCEDURE — 36415 COLL VENOUS BLD VENIPUNCTURE: CPT

## 2019-01-24 PROCEDURE — 1200000000 HC SEMI PRIVATE

## 2019-01-24 PROCEDURE — 99232 SBSQ HOSP IP/OBS MODERATE 35: CPT | Performed by: INTERNAL MEDICINE

## 2019-01-24 PROCEDURE — 80061 LIPID PANEL: CPT

## 2019-01-24 PROCEDURE — 97116 GAIT TRAINING THERAPY: CPT

## 2019-01-24 PROCEDURE — 85610 PROTHROMBIN TIME: CPT

## 2019-01-24 PROCEDURE — 85730 THROMBOPLASTIN TIME PARTIAL: CPT

## 2019-01-24 PROCEDURE — 82746 ASSAY OF FOLIC ACID SERUM: CPT

## 2019-01-24 PROCEDURE — 99222 1ST HOSP IP/OBS MODERATE 55: CPT | Performed by: PSYCHIATRY & NEUROLOGY

## 2019-01-24 PROCEDURE — 6370000000 HC RX 637 (ALT 250 FOR IP): Performed by: INTERNAL MEDICINE

## 2019-01-24 PROCEDURE — 97535 SELF CARE MNGMENT TRAINING: CPT

## 2019-01-24 PROCEDURE — 93306 TTE W/DOPPLER COMPLETE: CPT

## 2019-01-24 PROCEDURE — 80053 COMPREHEN METABOLIC PANEL: CPT

## 2019-01-24 PROCEDURE — 97166 OT EVAL MOD COMPLEX 45 MIN: CPT

## 2019-01-24 PROCEDURE — 2580000003 HC RX 258: Performed by: INTERNAL MEDICINE

## 2019-01-24 PROCEDURE — 86147 CARDIOLIPIN ANTIBODY EA IG: CPT

## 2019-01-24 PROCEDURE — 70551 MRI BRAIN STEM W/O DYE: CPT

## 2019-01-24 PROCEDURE — 82607 VITAMIN B-12: CPT

## 2019-01-24 PROCEDURE — 85027 COMPLETE CBC AUTOMATED: CPT

## 2019-01-24 PROCEDURE — 83090 ASSAY OF HOMOCYSTEINE: CPT

## 2019-01-24 PROCEDURE — 83036 HEMOGLOBIN GLYCOSYLATED A1C: CPT

## 2019-01-24 PROCEDURE — 6370000000 HC RX 637 (ALT 250 FOR IP): Performed by: PSYCHIATRY & NEUROLOGY

## 2019-01-24 PROCEDURE — 85613 RUSSELL VIPER VENOM DILUTED: CPT

## 2019-01-24 PROCEDURE — 6360000002 HC RX W HCPCS: Performed by: INTERNAL MEDICINE

## 2019-01-24 PROCEDURE — 97162 PT EVAL MOD COMPLEX 30 MIN: CPT

## 2019-01-24 RX ORDER — ASPIRIN 81 MG/1
81 TABLET, CHEWABLE ORAL DAILY
Status: DISCONTINUED | OUTPATIENT
Start: 2019-01-24 | End: 2019-01-25 | Stop reason: HOSPADM

## 2019-01-24 RX ADMIN — ENOXAPARIN SODIUM 40 MG: 40 INJECTION SUBCUTANEOUS at 08:50

## 2019-01-24 RX ADMIN — CLOPIDOGREL BISULFATE 75 MG: 75 TABLET ORAL at 08:50

## 2019-01-24 RX ADMIN — ASPIRIN 81 MG 81 MG: 81 TABLET ORAL at 16:31

## 2019-01-24 RX ADMIN — ATORVASTATIN CALCIUM 80 MG: 80 TABLET, FILM COATED ORAL at 20:17

## 2019-01-24 RX ADMIN — Medication 10 ML: at 03:38

## 2019-01-24 RX ADMIN — Medication 10 ML: at 08:50

## 2019-01-24 RX ADMIN — SODIUM CHLORIDE: 9 INJECTION, SOLUTION INTRAVENOUS at 16:35

## 2019-01-24 ASSESSMENT — PAIN SCALES - GENERAL
PAINLEVEL_OUTOF10: 0
PAINLEVEL_OUTOF10: 0

## 2019-01-25 VITALS
HEART RATE: 75 BPM | HEIGHT: 69 IN | TEMPERATURE: 97.9 F | WEIGHT: 183 LBS | RESPIRATION RATE: 16 BRPM | DIASTOLIC BLOOD PRESSURE: 79 MMHG | OXYGEN SATURATION: 93 % | BODY MASS INDEX: 27.11 KG/M2 | SYSTOLIC BLOOD PRESSURE: 150 MMHG

## 2019-01-25 PROCEDURE — 97535 SELF CARE MNGMENT TRAINING: CPT | Performed by: NURSE PRACTITIONER

## 2019-01-25 PROCEDURE — 97116 GAIT TRAINING THERAPY: CPT

## 2019-01-25 PROCEDURE — 95816 EEG AWAKE AND DROWSY: CPT | Performed by: PSYCHIATRY & NEUROLOGY

## 2019-01-25 PROCEDURE — 6360000002 HC RX W HCPCS: Performed by: INTERNAL MEDICINE

## 2019-01-25 PROCEDURE — 97530 THERAPEUTIC ACTIVITIES: CPT | Performed by: NURSE PRACTITIONER

## 2019-01-25 PROCEDURE — 99232 SBSQ HOSP IP/OBS MODERATE 35: CPT | Performed by: FAMILY MEDICINE

## 2019-01-25 PROCEDURE — 95816 EEG AWAKE AND DROWSY: CPT

## 2019-01-25 PROCEDURE — 97530 THERAPEUTIC ACTIVITIES: CPT

## 2019-01-25 PROCEDURE — 99223 1ST HOSP IP/OBS HIGH 75: CPT | Performed by: INTERNAL MEDICINE

## 2019-01-25 PROCEDURE — 6370000000 HC RX 637 (ALT 250 FOR IP): Performed by: PSYCHIATRY & NEUROLOGY

## 2019-01-25 RX ORDER — ATORVASTATIN CALCIUM 80 MG/1
TABLET, FILM COATED ORAL
Qty: 30 TABLET | Refills: 3 | Status: SHIPPED | OUTPATIENT
Start: 2019-01-25 | End: 2020-01-06 | Stop reason: SDUPTHER

## 2019-01-25 RX ORDER — LISINOPRIL 5 MG/1
5 TABLET ORAL DAILY
Qty: 30 TABLET | Refills: 5 | Status: SHIPPED | OUTPATIENT
Start: 2019-01-25 | End: 2020-01-06 | Stop reason: SDUPTHER

## 2019-01-25 RX ORDER — ASPIRIN 81 MG/1
81 TABLET, CHEWABLE ORAL DAILY
Qty: 30 TABLET | Refills: 3 | Status: SHIPPED | OUTPATIENT
Start: 2019-01-26 | End: 2020-01-06 | Stop reason: SDUPTHER

## 2019-01-25 RX ADMIN — ENOXAPARIN SODIUM 40 MG: 40 INJECTION SUBCUTANEOUS at 09:09

## 2019-01-25 RX ADMIN — ASPIRIN 81 MG 81 MG: 81 TABLET ORAL at 09:08

## 2019-01-25 ASSESSMENT — ENCOUNTER SYMPTOMS
ABDOMINAL PAIN: 0
COUGH: 0
SHORTNESS OF BREATH: 0
DIARRHEA: 0
SINUS PAIN: 0
VOICE CHANGE: 0
WHEEZING: 0
CONSTIPATION: 0
SINUS PRESSURE: 0
VOMITING: 0
NAUSEA: 0
ABDOMINAL DISTENTION: 0
SORE THROAT: 0
APNEA: 0
ANAL BLEEDING: 0
BLOOD IN STOOL: 0
CHEST TIGHTNESS: 0

## 2019-01-25 ASSESSMENT — PAIN SCALES - GENERAL: PAINLEVEL_OUTOF10: 0

## 2019-01-28 ENCOUNTER — TELEPHONE (OUTPATIENT)
Dept: PRIMARY CARE CLINIC | Age: 64
End: 2019-01-28

## 2019-01-28 LAB
ESTIMATED AVERAGE GLUCOSE: 126 MG/DL
HBA1C MFR BLD: 6 % (ref 4–6)

## 2019-01-29 LAB
ANTICARDIOLIPIN IGA ANTIBODY: 4 APU
ANTICARDIOLIPIN IGG ANTIBODY: 3.1 GPU
CARDIOLIPIN AB IGM: 17.4 MPU
DILUTE RUSSELL VIPER VENOM TIME: NORMAL
INR BLD: 1.1
LUPUS ANTICOAG: NORMAL
PARTIAL THROMBOPLASTIN TIME: 30.1 SEC (ref 23–31)
PROTHROMBIN TIME: 11 SEC (ref 9.7–11.6)

## 2019-02-01 DIAGNOSIS — E53.8 VITAMIN B12 DEFICIENCY (NON ANEMIC): ICD-10-CM

## 2019-02-01 RX ORDER — PSYLLIUM HUSK 3.4 G/7G
POWDER ORAL
Qty: 30 TABLET | Refills: 1 | Status: SHIPPED | OUTPATIENT
Start: 2019-02-01 | End: 2019-05-06 | Stop reason: SDUPTHER

## 2019-02-07 ENCOUNTER — TELEPHONE (OUTPATIENT)
Dept: NEUROLOGY | Age: 64
End: 2019-02-07

## 2019-02-07 ENCOUNTER — TELEPHONE (OUTPATIENT)
Dept: PRIMARY CARE CLINIC | Age: 64
End: 2019-02-07

## 2019-02-27 ENCOUNTER — TELEPHONE (OUTPATIENT)
Dept: PRIMARY CARE CLINIC | Age: 64
End: 2019-02-27

## 2019-05-06 DIAGNOSIS — E53.8 VITAMIN B12 DEFICIENCY (NON ANEMIC): ICD-10-CM

## 2019-05-06 RX ORDER — PSYLLIUM HUSK 3.4 G/7G
POWDER ORAL
Qty: 30 TABLET | Refills: 1 | Status: SHIPPED | OUTPATIENT
Start: 2019-05-06 | End: 2019-09-09 | Stop reason: SDUPTHER

## 2019-05-06 NOTE — TELEPHONE ENCOUNTER
Health Maintenance   Topic Date Due    AAA screen  1955    Hepatitis C screen  1955    HIV screen  04/04/1969    Shingles Vaccine (1 of 2) 04/04/2004    Low dose CT lung screening  04/04/2009    Colon cancer screen colonoscopy  03/10/2019    Pneumococcal 65+ years Vaccine (1 of 2 - PCV13) 04/04/2019    Flu vaccine (Season Ended) 09/01/2019    A1C test (Diabetic or Prediabetic)  01/24/2020    Potassium monitoring  01/24/2020    Creatinine monitoring  01/24/2020    Lipid screen  01/24/2024    DTaP/Tdap/Td vaccine (2 - Td) 12/31/2028             (applicable per patient's age: Cancer Screenings, Depression Screening, Fall Risk Screening, Immunizations)    Hemoglobin A1C (%)   Date Value   01/24/2019 6.0   12/08/2016 5.2   10/10/2016 5.3     LDL Cholesterol (mg/dL)   Date Value   01/24/2019 54     AST (U/L)   Date Value   01/24/2019 11     ALT (U/L)   Date Value   01/24/2019 11     BUN (mg/dL)   Date Value   01/24/2019 11      (goal A1C is < 7)   (goal LDL is <100) need 30-50% reduction from baseline     BP Readings from Last 3 Encounters:   01/25/19 (!) 150/79   12/31/18 128/84   12/03/18 (!) 147/96    (goal /80)      All Future Testing planned in [facility]:  Lab Frequency Next Occurrence       Next Visit Date:  No future appointments.          Patient Active Problem List:     Hypercholesteremia     GERD (gastroesophageal reflux disease)     Dizzy     Anemia     Epigastric pain     Upper GI bleed     Blood loss anemia     GI bleed     Sigmoid polyp     Gastritis     Acute blood loss anemia     Gastric ulcer     Gastrointestinal hemorrhage     LEVI (acute kidney injury) ([address]     Mild malnutrition (HCC)     Smoker     Colon polyp     Essential hypertension     Coronary artery disease involving native heart without angina pectoris     Hyperglycemia     Hypokalemia     Severe anemia     Acute lower GI bleeding     Coronary artery disease involving native coronary artery of native heart without angina pectoris     Benign essential HTN     Acute gastric ulcer with hemorrhage     Gastrointestinal hemorrhage with hematemesis     AVM (arteriovenous malformation) of duodenum, acquired     History of colon polyps     Diverticulosis of colon     Adenomatous colon polyp     Hematemesis     Chronic fatigue     PUD (peptic ulcer disease)     Anemia     Diverticulosis of large intestine without hemorrhage     Arthritis of knee     Cerebellar cerebrovascular accident (CVA) without late effect     Syncope and collapse     Multi-infarct state     Left homonymous hemianopsia     Occipital stroke (HCC)     Visual impairment severe bilaterally     Hypotension due to blood loss     Acute ischemic stroke (HCC)     Hypotension     Symptomatic anemia     History of GI bleed     AVM (arteriovenous malformation)     Iron deficiency anemia     Prolonged Q-T interval on ECG     Memory loss of unknown cause     Visual disturbance

## 2019-08-30 ENCOUNTER — TELEPHONE (OUTPATIENT)
Dept: ONCOLOGY | Age: 64
End: 2019-08-30

## 2019-08-30 ENCOUNTER — OFFICE VISIT (OUTPATIENT)
Dept: PRIMARY CARE CLINIC | Age: 64
End: 2019-08-30
Payer: MEDICARE

## 2019-08-30 VITALS
WEIGHT: 192 LBS | HEART RATE: 77 BPM | SYSTOLIC BLOOD PRESSURE: 142 MMHG | BODY MASS INDEX: 28.35 KG/M2 | OXYGEN SATURATION: 95 % | TEMPERATURE: 98.2 F | DIASTOLIC BLOOD PRESSURE: 82 MMHG

## 2019-08-30 DIAGNOSIS — Z86.2 HISTORY OF ANEMIA: ICD-10-CM

## 2019-08-30 DIAGNOSIS — R53.83 FATIGUE, UNSPECIFIED TYPE: ICD-10-CM

## 2019-08-30 DIAGNOSIS — R76.0 ANTICARDIOLIPIN ANTIBODY POSITIVE: ICD-10-CM

## 2019-08-30 DIAGNOSIS — I63.9 OCCIPITAL STROKE (HCC): Primary | ICD-10-CM

## 2019-08-30 DIAGNOSIS — Z87.891 PERSONAL HISTORY OF TOBACCO USE: ICD-10-CM

## 2019-08-30 PROCEDURE — G0296 VISIT TO DETERM LDCT ELIG: HCPCS | Performed by: NURSE PRACTITIONER

## 2019-08-30 PROCEDURE — 99214 OFFICE O/P EST MOD 30 MIN: CPT | Performed by: NURSE PRACTITIONER

## 2019-08-30 ASSESSMENT — ENCOUNTER SYMPTOMS
EYE REDNESS: 0
NAUSEA: 0
SHORTNESS OF BREATH: 0
PHOTOPHOBIA: 0
TROUBLE SWALLOWING: 0
DIARRHEA: 0
EYE PAIN: 0
CHANGE IN BOWEL HABIT: 0
ABDOMINAL PAIN: 0
VOMITING: 0
WHEEZING: 0
CHEST TIGHTNESS: 0
APNEA: 0
BLOOD IN STOOL: 0

## 2019-08-30 ASSESSMENT — PATIENT HEALTH QUESTIONNAIRE - PHQ9
1. LITTLE INTEREST OR PLEASURE IN DOING THINGS: 0
SUM OF ALL RESPONSES TO PHQ9 QUESTIONS 1 & 2: 0
SUM OF ALL RESPONSES TO PHQ QUESTIONS 1-9: 0
SUM OF ALL RESPONSES TO PHQ QUESTIONS 1-9: 0
2. FEELING DOWN, DEPRESSED OR HOPELESS: 0

## 2019-08-30 NOTE — PROGRESS NOTES
Musculoskeletal: Negative for myalgias and neck pain. Skin: Negative for rash. Neurological: Negative for seizures, numbness and headaches. Psychiatric/Behavioral: Positive for confusion. Negative for suicidal ideas. The patient is not nervous/anxious. Prior to Visit Medications    Medication Sig Taking? Authorizing Provider   DAVIAN VITAMIN B-12 TR 1000 MCG TBCR take 1 tablet by mouth once daily as directed Yes CESAR Acosta CNP   atorvastatin (LIPITOR) 80 MG tablet take 1 tablet by mouth once daily Yes Anabel Souza MD   metoprolol tartrate (LOPRESSOR) 25 MG tablet Take 0.5 tablets by mouth 2 times daily Yes Anabel Souza MD   aspirin 81 MG chewable tablet Take 1 tablet by mouth daily Yes Anabel Souza MD   lisinopril (PRINIVIL;ZESTRIL) 5 MG tablet Take 1 tablet by mouth daily Yes Anabel Souza MD   omeprazole (PRILOSEC) 20 MG delayed release capsule Take 20 mg by mouth daily Yes Historical Provider, MD   magnesium oxide (MAG-OX) 400 (241.3 Mg) MG TABS tablet take 1 tablet by mouth once daily Yes CESAR Acosta CNP        Social History     Tobacco Use    Smoking status: Current Every Day Smoker     Packs/day: 1.00     Years: 30.00     Pack years: 30.00     Types: Cigarettes    Smokeless tobacco: Never Used   Substance Use Topics    Alcohol use: Yes     Comment: q3-4 months        Vitals:    08/30/19 1037   BP: (!) 142/82   Site: Left Upper Arm   Position: Sitting   Cuff Size: Medium Adult   Pulse: 77   Temp: 98.2 °F (36.8 °C)   TempSrc: Oral   SpO2: 95%   Weight: 192 lb (87.1 kg)     Estimated body mass index is 28.35 kg/m² as calculated from the following:    Height as of 1/23/19: 5' 9\" (1.753 m). Weight as of this encounter: 192 lb (87.1 kg).     DIAGNOSTIC FINDINGS:  CBC:  Lab Results   Component Value Date    WBC 7.6 01/24/2019    HGB 14.9 01/24/2019     01/24/2019     09/20/2011       BMP:    Lab Results   Component Value Date     01/24/2019    K 3.9 Ferritin    Vitamin B12 & Folate    Comprehensive Metabolic Panel, Fasting   5. Personal history of tobacco use  IN VISIT TO DISCUSS LUNG CA SCREEN W LDCT    CT Lung Screen (Annual)          PLAN:  No orders of the defined types were placed in this encounter. 1.  5/8 stopbang score. If Anemia studies normal, check for ATILIO in regards to fatigue  2. Needs to see specialists since stroke in Jan. Referrals redone for Neurology and Hematology. 3. No previous Lung CA screening, smoking since his 20's. Agreeable to CT screening. FOLLOW UP AND INSTRUCTIONS:  Return in about 2 months (around 10/30/2019) for Fatigue, HTN. · Ricky received counseling on the following healthy behaviors:medication adherence and tobacco cessation    · Discussed use, benefit, and side effects of prescribed medications. Barriers to  medication compliance addressed. All patient questions answered. Pt  verbalized understanding of all instructions given. · Patient given educational materials - see patient instructions      · Patient advised to contact scheduling offices for any referrals or imaging orders  placed today if they have not been contacted in 48 hours. Return in about 2 months (around 10/30/2019) for Fatigue, HTN. An electronic signature was used to authenticate this note. --Toni Salazar, CESAR - CNP on 8/30/2019 at 12:30 PM  Visit Information    Have you changed or started any medications since your last visit including any over-the-counter medicines, vitamins, or herbal medicines? no   Are you having any side effects from any of your medications? -  no  Have you stopped taking any of your medications? Is so, why? -  no    Have you seen any other physician or provider since your last visit? No  Have you had any other diagnostic tests since your last visit? No  Have you been seen in the emergency room and/or had an admission to a hospital since we last saw you?  No  Have you had your routine dental to continue lung cancer screening effectiveness     Risks associated with radiation from annual LDCT- Radiation exposure is about the same as for a mammogram, which is about 1/3 of the annual background radiation exposure from everyday life. Starting screening at age 54 is not likely to increase cancer risk from radiation exposure. Patients with comorbidities resulting in life expectancy of < 10 years, or that would preclude treatment of an abnormality identified on CT, should not be screened due to lack of benefit.     To obtain maximal benefit from this screening, smoking cessation and long-term abstinence from smoking is critical

## 2019-09-07 DIAGNOSIS — E53.8 VITAMIN B12 DEFICIENCY (NON ANEMIC): ICD-10-CM

## 2019-09-09 RX ORDER — PSYLLIUM HUSK 3.4 G/7G
POWDER ORAL
Qty: 30 TABLET | Refills: 1 | Status: SHIPPED | OUTPATIENT
Start: 2019-09-09 | End: 2020-01-06 | Stop reason: SDUPTHER

## 2019-10-02 ENCOUNTER — HOSPITAL ENCOUNTER (OUTPATIENT)
Facility: MEDICAL CENTER | Age: 64
End: 2019-10-02
Payer: MEDICARE

## 2019-10-17 ENCOUNTER — TELEPHONE (OUTPATIENT)
Dept: ONCOLOGY | Age: 64
End: 2019-10-17

## 2019-12-18 ENCOUNTER — APPOINTMENT (OUTPATIENT)
Dept: MRI IMAGING | Age: 64
End: 2019-12-18
Payer: MEDICARE

## 2019-12-18 ENCOUNTER — APPOINTMENT (OUTPATIENT)
Dept: CT IMAGING | Age: 64
End: 2019-12-18
Payer: MEDICARE

## 2019-12-18 ENCOUNTER — HOSPITAL ENCOUNTER (EMERGENCY)
Age: 64
Discharge: ANOTHER HOSPITAL | End: 2019-12-18
Attending: EMERGENCY MEDICINE
Payer: MEDICARE

## 2019-12-18 ENCOUNTER — HOSPITAL ENCOUNTER (OUTPATIENT)
Age: 64
Setting detail: OBSERVATION
Discharge: HOME | End: 2019-12-19
Attending: EMERGENCY MEDICINE | Admitting: INTERNAL MEDICINE
Payer: MEDICARE

## 2019-12-18 VITALS
SYSTOLIC BLOOD PRESSURE: 136 MMHG | BODY MASS INDEX: 26.48 KG/M2 | OXYGEN SATURATION: 98 % | HEIGHT: 70 IN | HEART RATE: 81 BPM | DIASTOLIC BLOOD PRESSURE: 65 MMHG | TEMPERATURE: 98 F | RESPIRATION RATE: 19 BRPM | WEIGHT: 185 LBS

## 2019-12-18 DIAGNOSIS — H54.3 VISION LOSS, BILATERAL: Primary | ICD-10-CM

## 2019-12-18 PROBLEM — I63.89 LEFT TEMPORAL LOBE INFARCTION (HCC): Status: ACTIVE | Noted: 2019-12-18

## 2019-12-18 PROBLEM — I63.9 CEREBELLAR INFARCT (HCC): Status: ACTIVE | Noted: 2019-12-18

## 2019-12-18 PROBLEM — R29.90 STROKE-LIKE SYMPTOM: Status: ACTIVE | Noted: 2019-12-18

## 2019-12-18 PROBLEM — H54.7 VISION LOSS: Status: ACTIVE | Noted: 2019-12-18

## 2019-12-18 LAB
ABSOLUTE EOS #: 0.07 K/UL (ref 0–0.44)
ABSOLUTE IMMATURE GRANULOCYTE: 0.01 K/UL (ref 0–0.3)
ABSOLUTE LYMPH #: 1.04 K/UL (ref 1.1–3.7)
ABSOLUTE MONO #: 0.81 K/UL (ref 0.1–1.2)
ANION GAP SERPL CALCULATED.3IONS-SCNC: 8 MMOL/L (ref 9–17)
BASOPHILS # BLD: 0 % (ref 0–2)
BASOPHILS ABSOLUTE: <0.03 K/UL (ref 0–0.2)
BUN BLDV-MCNC: 13 MG/DL (ref 8–23)
BUN/CREAT BLD: 10 (ref 9–20)
CALCIUM SERPL-MCNC: 9.5 MG/DL (ref 8.6–10.4)
CHLORIDE BLD-SCNC: 102 MMOL/L (ref 98–107)
CO2: 31 MMOL/L (ref 20–31)
CREAT SERPL-MCNC: 1.26 MG/DL (ref 0.7–1.2)
DIFFERENTIAL TYPE: ABNORMAL
EKG ATRIAL RATE: 89 BPM
EKG P AXIS: 61 DEGREES
EKG P-R INTERVAL: 164 MS
EKG Q-T INTERVAL: 382 MS
EKG QRS DURATION: 96 MS
EKG QTC CALCULATION (BAZETT): 464 MS
EKG R AXIS: 37 DEGREES
EKG T AXIS: -6 DEGREES
EKG VENTRICULAR RATE: 89 BPM
EOSINOPHILS RELATIVE PERCENT: 1 % (ref 1–4)
GFR AFRICAN AMERICAN: >60 ML/MIN
GFR NON-AFRICAN AMERICAN: 57 ML/MIN
GFR SERPL CREATININE-BSD FRML MDRD: ABNORMAL ML/MIN/{1.73_M2}
GFR SERPL CREATININE-BSD FRML MDRD: ABNORMAL ML/MIN/{1.73_M2}
GLUCOSE BLD-MCNC: 115 MG/DL (ref 75–110)
GLUCOSE BLD-MCNC: 94 MG/DL (ref 70–99)
HCT VFR BLD CALC: 44 % (ref 40.7–50.3)
HEMOGLOBIN: 14.2 G/DL (ref 13–17)
IMMATURE GRANULOCYTES: 0 %
INR BLD: 1.1
LYMPHOCYTES # BLD: 13 % (ref 24–43)
MCH RBC QN AUTO: 30.6 PG (ref 25.2–33.5)
MCHC RBC AUTO-ENTMCNC: 32.3 G/DL (ref 28–38)
MCV RBC AUTO: 94.8 FL (ref 82.6–102.9)
MONOCYTES # BLD: 10 % (ref 3–12)
NRBC AUTOMATED: ABNORMAL PER 100 WBC
PDW BLD-RTO: 14.5 % (ref 11.8–14.4)
PLATELET # BLD: 224 K/UL (ref 138–453)
PLATELET ESTIMATE: ABNORMAL
PMV BLD AUTO: 10.3 FL (ref 8.1–13.5)
POTASSIUM SERPL-SCNC: 4 MMOL/L (ref 3.7–5.3)
PROTHROMBIN TIME: 10.8 SEC (ref 9.7–11.6)
RBC # BLD: 4.64 M/UL (ref 4.21–5.77)
RBC # BLD: ABNORMAL 10*6/UL
SEG NEUTROPHILS: 76 % (ref 36–65)
SEGMENTED NEUTROPHILS ABSOLUTE COUNT: 6.22 K/UL (ref 1.5–8.1)
SODIUM BLD-SCNC: 141 MMOL/L (ref 135–144)
TROPONIN INTERP: NORMAL
TROPONIN T: NORMAL NG/ML
TROPONIN, HIGH SENSITIVITY: 11 NG/L (ref 0–22)
WBC # BLD: 8.2 K/UL (ref 3.5–11.3)
WBC # BLD: ABNORMAL 10*3/UL

## 2019-12-18 PROCEDURE — 6360000004 HC RX CONTRAST MEDICATION: Performed by: EMERGENCY MEDICINE

## 2019-12-18 PROCEDURE — 85610 PROTHROMBIN TIME: CPT

## 2019-12-18 PROCEDURE — 84484 ASSAY OF TROPONIN QUANT: CPT

## 2019-12-18 PROCEDURE — 99204 OFFICE O/P NEW MOD 45 MIN: CPT | Performed by: PSYCHIATRY & NEUROLOGY

## 2019-12-18 PROCEDURE — 93010 ELECTROCARDIOGRAM REPORT: CPT | Performed by: INTERNAL MEDICINE

## 2019-12-18 PROCEDURE — 97162 PT EVAL MOD COMPLEX 30 MIN: CPT

## 2019-12-18 PROCEDURE — 99285 EMERGENCY DEPT VISIT HI MDM: CPT

## 2019-12-18 PROCEDURE — G0378 HOSPITAL OBSERVATION PER HR: HCPCS

## 2019-12-18 PROCEDURE — 70450 CT HEAD/BRAIN W/O DYE: CPT

## 2019-12-18 PROCEDURE — 2580000003 HC RX 258: Performed by: EMERGENCY MEDICINE

## 2019-12-18 PROCEDURE — 80048 BASIC METABOLIC PNL TOTAL CA: CPT

## 2019-12-18 PROCEDURE — 85025 COMPLETE CBC W/AUTO DIFF WBC: CPT

## 2019-12-18 PROCEDURE — 6370000000 HC RX 637 (ALT 250 FOR IP): Performed by: EMERGENCY MEDICINE

## 2019-12-18 PROCEDURE — 99219 PR INITIAL OBSERVATION CARE/DAY 50 MINUTES: CPT | Performed by: PSYCHIATRY & NEUROLOGY

## 2019-12-18 PROCEDURE — 2580000003 HC RX 258: Performed by: NURSE PRACTITIONER

## 2019-12-18 PROCEDURE — 2580000003 HC RX 258: Performed by: STUDENT IN AN ORGANIZED HEALTH CARE EDUCATION/TRAINING PROGRAM

## 2019-12-18 PROCEDURE — 93005 ELECTROCARDIOGRAM TRACING: CPT | Performed by: EMERGENCY MEDICINE

## 2019-12-18 PROCEDURE — 99291 CRITICAL CARE FIRST HOUR: CPT

## 2019-12-18 PROCEDURE — 70498 CT ANGIOGRAPHY NECK: CPT

## 2019-12-18 PROCEDURE — 82947 ASSAY GLUCOSE BLOOD QUANT: CPT

## 2019-12-18 PROCEDURE — 93005 ELECTROCARDIOGRAM TRACING: CPT | Performed by: STUDENT IN AN ORGANIZED HEALTH CARE EDUCATION/TRAINING PROGRAM

## 2019-12-18 PROCEDURE — 97116 GAIT TRAINING THERAPY: CPT

## 2019-12-18 PROCEDURE — 6370000000 HC RX 637 (ALT 250 FOR IP): Performed by: NURSE PRACTITIONER

## 2019-12-18 PROCEDURE — 70551 MRI BRAIN STEM W/O DYE: CPT

## 2019-12-18 PROCEDURE — 99219 PR INITIAL OBSERVATION CARE/DAY 50 MINUTES: CPT | Performed by: INTERNAL MEDICINE

## 2019-12-18 RX ORDER — SODIUM CHLORIDE 0.9 % (FLUSH) 0.9 %
10 SYRINGE (ML) INJECTION PRN
Status: DISCONTINUED | OUTPATIENT
Start: 2019-12-18 | End: 2019-12-19 | Stop reason: HOSPADM

## 2019-12-18 RX ORDER — NICOTINE 21 MG/24HR
1 PATCH, TRANSDERMAL 24 HOURS TRANSDERMAL DAILY PRN
Status: DISCONTINUED | OUTPATIENT
Start: 2019-12-18 | End: 2019-12-19 | Stop reason: HOSPADM

## 2019-12-18 RX ORDER — PANTOPRAZOLE SODIUM 40 MG/1
40 TABLET, DELAYED RELEASE ORAL
Status: DISCONTINUED | OUTPATIENT
Start: 2019-12-18 | End: 2019-12-19 | Stop reason: HOSPADM

## 2019-12-18 RX ORDER — 0.9 % SODIUM CHLORIDE 0.9 %
80 INTRAVENOUS SOLUTION INTRAVENOUS ONCE
Status: COMPLETED | OUTPATIENT
Start: 2019-12-18 | End: 2019-12-18

## 2019-12-18 RX ORDER — ACETAMINOPHEN 325 MG/1
650 TABLET ORAL EVERY 4 HOURS PRN
Status: DISCONTINUED | OUTPATIENT
Start: 2019-12-18 | End: 2019-12-19 | Stop reason: HOSPADM

## 2019-12-18 RX ORDER — SODIUM CHLORIDE 9 MG/ML
INJECTION, SOLUTION INTRAVENOUS CONTINUOUS
Status: DISCONTINUED | OUTPATIENT
Start: 2019-12-18 | End: 2019-12-19 | Stop reason: HOSPADM

## 2019-12-18 RX ORDER — LISINOPRIL 10 MG/1
5 TABLET ORAL DAILY
Status: DISCONTINUED | OUTPATIENT
Start: 2019-12-18 | End: 2019-12-19 | Stop reason: HOSPADM

## 2019-12-18 RX ORDER — 0.9 % SODIUM CHLORIDE 0.9 %
500 INTRAVENOUS SOLUTION INTRAVENOUS ONCE
Status: COMPLETED | OUTPATIENT
Start: 2019-12-18 | End: 2019-12-18

## 2019-12-18 RX ORDER — ASPIRIN 81 MG/1
81 TABLET, CHEWABLE ORAL DAILY
Status: DISCONTINUED | OUTPATIENT
Start: 2019-12-18 | End: 2019-12-19 | Stop reason: HOSPADM

## 2019-12-18 RX ORDER — ASPIRIN 81 MG/1
324 TABLET, CHEWABLE ORAL ONCE
Status: COMPLETED | OUTPATIENT
Start: 2019-12-18 | End: 2019-12-18

## 2019-12-18 RX ORDER — CLOPIDOGREL BISULFATE 75 MG/1
300 TABLET ORAL ONCE
Status: COMPLETED | OUTPATIENT
Start: 2019-12-18 | End: 2019-12-18

## 2019-12-18 RX ORDER — 0.9 % SODIUM CHLORIDE 0.9 %
1000 INTRAVENOUS SOLUTION INTRAVENOUS ONCE
Status: DISCONTINUED | OUTPATIENT
Start: 2019-12-18 | End: 2019-12-18

## 2019-12-18 RX ORDER — SODIUM CHLORIDE 0.9 % (FLUSH) 0.9 %
10 SYRINGE (ML) INJECTION EVERY 12 HOURS SCHEDULED
Status: DISCONTINUED | OUTPATIENT
Start: 2019-12-18 | End: 2019-12-19 | Stop reason: HOSPADM

## 2019-12-18 RX ORDER — SODIUM CHLORIDE 0.9 % (FLUSH) 0.9 %
10 SYRINGE (ML) INJECTION PRN
Status: DISCONTINUED | OUTPATIENT
Start: 2019-12-18 | End: 2019-12-18 | Stop reason: HOSPADM

## 2019-12-18 RX ORDER — SODIUM CHLORIDE 9 MG/ML
1000 INJECTION, SOLUTION INTRAVENOUS CONTINUOUS
Status: DISCONTINUED | OUTPATIENT
Start: 2019-12-18 | End: 2019-12-18 | Stop reason: HOSPADM

## 2019-12-18 RX ORDER — ONDANSETRON 2 MG/ML
4 INJECTION INTRAMUSCULAR; INTRAVENOUS EVERY 6 HOURS PRN
Status: DISCONTINUED | OUTPATIENT
Start: 2019-12-18 | End: 2019-12-19 | Stop reason: HOSPADM

## 2019-12-18 RX ORDER — 0.9 % SODIUM CHLORIDE 0.9 %
1000 INTRAVENOUS SOLUTION INTRAVENOUS ONCE
Status: COMPLETED | OUTPATIENT
Start: 2019-12-18 | End: 2019-12-18

## 2019-12-18 RX ORDER — ATORVASTATIN CALCIUM 80 MG/1
80 TABLET, FILM COATED ORAL DAILY
Status: DISCONTINUED | OUTPATIENT
Start: 2019-12-18 | End: 2019-12-19 | Stop reason: HOSPADM

## 2019-12-18 RX ADMIN — MAGNESIUM GLUCONATE 500 MG ORAL TABLET 400 MG: 500 TABLET ORAL at 14:10

## 2019-12-18 RX ADMIN — CLOPIDOGREL BISULFATE 300 MG: 75 TABLET ORAL at 01:56

## 2019-12-18 RX ADMIN — SODIUM CHLORIDE 80 ML: 9 INJECTION, SOLUTION INTRAVENOUS at 00:48

## 2019-12-18 RX ADMIN — SODIUM CHLORIDE 500 ML: 0.9 INJECTION, SOLUTION INTRAVENOUS at 06:00

## 2019-12-18 RX ADMIN — SODIUM CHLORIDE 1000 ML: 9 INJECTION, SOLUTION INTRAVENOUS at 00:56

## 2019-12-18 RX ADMIN — LISINOPRIL 5 MG: 10 TABLET ORAL at 16:54

## 2019-12-18 RX ADMIN — Medication 10 ML: at 00:48

## 2019-12-18 RX ADMIN — IOPAMIDOL 75 ML: 755 INJECTION, SOLUTION INTRAVENOUS at 00:48

## 2019-12-18 RX ADMIN — ATORVASTATIN CALCIUM 80 MG: 80 TABLET, FILM COATED ORAL at 14:10

## 2019-12-18 RX ADMIN — SODIUM CHLORIDE 1000 ML: 9 INJECTION, SOLUTION INTRAVENOUS at 01:55

## 2019-12-18 RX ADMIN — METOPROLOL TARTRATE 12.5 MG: 25 TABLET ORAL at 13:13

## 2019-12-18 RX ADMIN — SODIUM CHLORIDE: 9 INJECTION, SOLUTION INTRAVENOUS at 13:13

## 2019-12-18 RX ADMIN — ASPIRIN 81 MG: 81 TABLET, CHEWABLE ORAL at 14:11

## 2019-12-18 RX ADMIN — ASPIRIN 81 MG 324 MG: 81 TABLET ORAL at 01:55

## 2019-12-18 RX ADMIN — PANTOPRAZOLE SODIUM 40 MG: 40 TABLET, DELAYED RELEASE ORAL at 13:13

## 2019-12-18 ASSESSMENT — ENCOUNTER SYMPTOMS
VOMITING: 0
EYE REDNESS: 0
EYE PAIN: 0
BLOOD IN STOOL: 0
NAUSEA: 0
FACIAL SWELLING: 0
SHORTNESS OF BREATH: 0
EYE DISCHARGE: 0
BACK PAIN: 0
PHOTOPHOBIA: 0
ABDOMINAL DISTENTION: 0
COUGH: 0
ABDOMINAL PAIN: 0
WHEEZING: 0

## 2019-12-19 VITALS
RESPIRATION RATE: 18 BRPM | SYSTOLIC BLOOD PRESSURE: 125 MMHG | HEART RATE: 68 BPM | OXYGEN SATURATION: 94 % | BODY MASS INDEX: 26.48 KG/M2 | TEMPERATURE: 97.5 F | DIASTOLIC BLOOD PRESSURE: 81 MMHG | HEIGHT: 70 IN | WEIGHT: 185 LBS

## 2019-12-19 PROBLEM — H54.3 VISION LOSS, BILATERAL: Status: ACTIVE | Noted: 2019-12-18

## 2019-12-19 PROBLEM — I65.21 INTERNAL CAROTID ARTERY STENOSIS, RIGHT: Status: ACTIVE | Noted: 2019-12-19

## 2019-12-19 PROBLEM — G45.9 TRANSIENT ISCHEMIC ATTACK (TIA): Status: ACTIVE | Noted: 2019-12-19

## 2019-12-19 PROBLEM — H26.9 CATARACTS, BILATERAL: Status: ACTIVE | Noted: 2019-12-19

## 2019-12-19 LAB
ALBUMIN SERPL-MCNC: 3.2 G/DL (ref 3.5–5.2)
ALBUMIN/GLOBULIN RATIO: 0.9 (ref 1–2.5)
ALP BLD-CCNC: 72 U/L (ref 40–129)
ALT SERPL-CCNC: 13 U/L (ref 5–41)
ANION GAP SERPL CALCULATED.3IONS-SCNC: 8 MMOL/L (ref 9–17)
AST SERPL-CCNC: 16 U/L
BILIRUB SERPL-MCNC: 0.72 MG/DL (ref 0.3–1.2)
BUN BLDV-MCNC: 10 MG/DL (ref 8–23)
BUN/CREAT BLD: ABNORMAL (ref 9–20)
CALCIUM SERPL-MCNC: 8 MG/DL (ref 8.6–10.4)
CHLORIDE BLD-SCNC: 110 MMOL/L (ref 98–107)
CO2: 22 MMOL/L (ref 20–31)
CREAT SERPL-MCNC: 0.96 MG/DL (ref 0.7–1.2)
GFR AFRICAN AMERICAN: >60 ML/MIN
GFR NON-AFRICAN AMERICAN: >60 ML/MIN
GFR SERPL CREATININE-BSD FRML MDRD: ABNORMAL ML/MIN/{1.73_M2}
GFR SERPL CREATININE-BSD FRML MDRD: ABNORMAL ML/MIN/{1.73_M2}
GLUCOSE BLD-MCNC: 99 MG/DL (ref 70–99)
HCT VFR BLD CALC: 41.7 % (ref 40.7–50.3)
HEMOGLOBIN: 13.6 G/DL (ref 13–17)
INR BLD: 1.1
MCH RBC QN AUTO: 30.5 PG (ref 25.2–33.5)
MCHC RBC AUTO-ENTMCNC: 32.6 G/DL (ref 28.4–34.8)
MCV RBC AUTO: 93.5 FL (ref 82.6–102.9)
NRBC AUTOMATED: 0 PER 100 WBC
PDW BLD-RTO: 14.6 % (ref 11.8–14.4)
PLATELET # BLD: 192 K/UL (ref 138–453)
PMV BLD AUTO: 10.5 FL (ref 8.1–13.5)
POTASSIUM SERPL-SCNC: 4.1 MMOL/L (ref 3.7–5.3)
PROTHROMBIN TIME: 11.1 SEC (ref 9–12)
RBC # BLD: 4.46 M/UL (ref 4.21–5.77)
SODIUM BLD-SCNC: 140 MMOL/L (ref 135–144)
TOTAL PROTEIN: 6.7 G/DL (ref 6.4–8.3)
TROPONIN INTERP: NORMAL
TROPONIN T: NORMAL NG/ML
TROPONIN, HIGH SENSITIVITY: 9 NG/L (ref 0–22)
WBC # BLD: 6.4 K/UL (ref 3.5–11.3)

## 2019-12-19 PROCEDURE — 84484 ASSAY OF TROPONIN QUANT: CPT

## 2019-12-19 PROCEDURE — 6370000000 HC RX 637 (ALT 250 FOR IP): Performed by: INTERNAL MEDICINE

## 2019-12-19 PROCEDURE — 36415 COLL VENOUS BLD VENIPUNCTURE: CPT

## 2019-12-19 PROCEDURE — 6370000000 HC RX 637 (ALT 250 FOR IP): Performed by: NURSE PRACTITIONER

## 2019-12-19 PROCEDURE — 99225 PR SBSQ OBSERVATION CARE/DAY 25 MINUTES: CPT | Performed by: NURSE PRACTITIONER

## 2019-12-19 PROCEDURE — 99225 PR SBSQ OBSERVATION CARE/DAY 25 MINUTES: CPT | Performed by: INTERNAL MEDICINE

## 2019-12-19 PROCEDURE — 85610 PROTHROMBIN TIME: CPT

## 2019-12-19 PROCEDURE — 80053 COMPREHEN METABOLIC PANEL: CPT

## 2019-12-19 PROCEDURE — 85027 COMPLETE CBC AUTOMATED: CPT

## 2019-12-19 PROCEDURE — G0378 HOSPITAL OBSERVATION PER HR: HCPCS

## 2019-12-19 RX ORDER — NICOTINE 21 MG/24HR
1 PATCH, TRANSDERMAL 24 HOURS TRANSDERMAL DAILY PRN
Qty: 30 PATCH | Refills: 3 | Status: ON HOLD | OUTPATIENT
Start: 2019-12-19 | End: 2020-07-10

## 2019-12-19 RX ORDER — CALCIUM CARBONATE 200(500)MG
1000 TABLET,CHEWABLE ORAL ONCE
Status: COMPLETED | OUTPATIENT
Start: 2019-12-19 | End: 2019-12-19

## 2019-12-19 RX ADMIN — ATORVASTATIN CALCIUM 80 MG: 80 TABLET, FILM COATED ORAL at 09:17

## 2019-12-19 RX ADMIN — METOPROLOL TARTRATE 12.5 MG: 25 TABLET ORAL at 09:19

## 2019-12-19 RX ADMIN — MAGNESIUM GLUCONATE 500 MG ORAL TABLET 400 MG: 500 TABLET ORAL at 09:18

## 2019-12-19 RX ADMIN — ANTACID TABLETS 1000 MG: 500 TABLET, CHEWABLE ORAL at 09:17

## 2019-12-19 RX ADMIN — LISINOPRIL 5 MG: 10 TABLET ORAL at 09:18

## 2019-12-19 RX ADMIN — PANTOPRAZOLE SODIUM 40 MG: 40 TABLET, DELAYED RELEASE ORAL at 09:17

## 2019-12-19 RX ADMIN — ASPIRIN 81 MG: 81 TABLET, CHEWABLE ORAL at 09:17

## 2019-12-19 ASSESSMENT — ENCOUNTER SYMPTOMS
NAUSEA: 0
COUGH: 0
VOMITING: 0
ABDOMINAL PAIN: 0
SHORTNESS OF BREATH: 0

## 2019-12-19 ASSESSMENT — PAIN SCALES - GENERAL: PAINLEVEL_OUTOF10: 0

## 2019-12-20 LAB
EKG ATRIAL RATE: 74 BPM
EKG P AXIS: 55 DEGREES
EKG P-R INTERVAL: 166 MS
EKG Q-T INTERVAL: 398 MS
EKG QRS DURATION: 92 MS
EKG QTC CALCULATION (BAZETT): 441 MS
EKG R AXIS: 14 DEGREES
EKG T AXIS: -7 DEGREES
EKG VENTRICULAR RATE: 74 BPM

## 2019-12-20 PROCEDURE — 93010 ELECTROCARDIOGRAM REPORT: CPT | Performed by: INTERNAL MEDICINE

## 2020-01-06 ENCOUNTER — OFFICE VISIT (OUTPATIENT)
Dept: PRIMARY CARE CLINIC | Age: 65
End: 2020-01-06
Payer: MEDICARE

## 2020-01-06 VITALS
OXYGEN SATURATION: 98 % | WEIGHT: 199 LBS | TEMPERATURE: 97 F | HEIGHT: 69 IN | DIASTOLIC BLOOD PRESSURE: 80 MMHG | BODY MASS INDEX: 29.47 KG/M2 | SYSTOLIC BLOOD PRESSURE: 143 MMHG | HEART RATE: 69 BPM

## 2020-01-06 PROCEDURE — 99214 OFFICE O/P EST MOD 30 MIN: CPT | Performed by: NURSE PRACTITIONER

## 2020-01-06 RX ORDER — ATORVASTATIN CALCIUM 80 MG/1
TABLET, FILM COATED ORAL
Qty: 30 TABLET | Refills: 5 | Status: ON HOLD | OUTPATIENT
Start: 2020-01-06 | End: 2020-01-27 | Stop reason: HOSPADM

## 2020-01-06 RX ORDER — LISINOPRIL 5 MG/1
5 TABLET ORAL DAILY
Qty: 30 TABLET | Refills: 5 | Status: ON HOLD | OUTPATIENT
Start: 2020-01-06 | End: 2020-07-12 | Stop reason: HOSPADM

## 2020-01-06 RX ORDER — ASPIRIN 81 MG/1
81 TABLET, CHEWABLE ORAL DAILY
Qty: 30 TABLET | Refills: 3 | Status: SHIPPED | OUTPATIENT
Start: 2020-01-06 | End: 2020-07-08

## 2020-01-06 ASSESSMENT — ENCOUNTER SYMPTOMS
PHOTOPHOBIA: 0
DIARRHEA: 0
SHORTNESS OF BREATH: 0
CHEST TIGHTNESS: 0
VISUAL CHANGE: 0
TROUBLE SWALLOWING: 0
APNEA: 0
WHEEZING: 0
ABDOMINAL PAIN: 0
EYE PAIN: 0
BLOOD IN STOOL: 0
VOMITING: 0
EYE REDNESS: 0
NAUSEA: 0

## 2020-01-06 ASSESSMENT — PATIENT HEALTH QUESTIONNAIRE - PHQ9
SUM OF ALL RESPONSES TO PHQ9 QUESTIONS 1 & 2: 0
2. FEELING DOWN, DEPRESSED OR HOPELESS: 0
SUM OF ALL RESPONSES TO PHQ QUESTIONS 1-9: 0
SUM OF ALL RESPONSES TO PHQ QUESTIONS 1-9: 0
1. LITTLE INTEREST OR PLEASURE IN DOING THINGS: 0

## 2020-01-06 NOTE — PROGRESS NOTES
CNP   aspirin 81 MG chewable tablet Take 1 tablet by mouth daily Yes CESAR Steinberg CNP   Cyanocobalamin ER (RA VITAMIN B-12 TR) 1000 MCG TBCR take 1 tablet by mouth once daily as directed Yes CESAR Steinberg CNP   omeprazole (PRILOSEC) 20 MG delayed release capsule Take 20 mg by mouth daily Yes Historical Provider, MD   magnesium oxide (MAG-OX) 400 (241.3 Mg) MG TABS tablet take 1 tablet by mouth once daily Yes CESAR Steinberg CNP   nicotine (NICODERM CQ) 21 MG/24HR Place 1 patch onto the skin daily as needed (if patient smokes)  Patient not taking: Reported on 1/6/2020  Jarett Rings, DO        Social History     Tobacco Use    Smoking status: Current Every Day Smoker     Packs/day: 1.00     Years: 30.00     Pack years: 30.00     Types: Cigarettes    Smokeless tobacco: Never Used   Substance Use Topics    Alcohol use: Not Currently        Vitals:    01/06/20 1229 01/06/20 1318   BP: (!) 155/82 (!) 143/80   Pulse: 74 69   Temp: 97 °F (36.1 °C)    TempSrc: Oral    SpO2: 98%    Weight: 199 lb (90.3 kg)    Height: 5' 9\" (1.753 m)      Estimated body mass index is 29.39 kg/m² as calculated from the following:    Height as of this encounter: 5' 9\" (1.753 m). Weight as of this encounter: 199 lb (90.3 kg).     DIAGNOSTIC FINDINGS:  CBC:  Lab Results   Component Value Date    WBC 6.4 12/19/2019    HGB 13.6 12/19/2019     12/19/2019     09/20/2011       BMP:    Lab Results   Component Value Date     12/19/2019    K 4.1 12/19/2019     12/19/2019    CO2 22 12/19/2019    BUN 10 12/19/2019    CREATININE 0.96 12/19/2019    GLUCOSE 99 12/19/2019    GLUCOSE 116 09/20/2011       HEMOGLOBIN A1C:   Lab Results   Component Value Date    LABA1C 6.0 01/24/2019       FASTING LIPID PANEL:  Lab Results   Component Value Date    CHOL 107 01/24/2019    HDL 21 (L) 01/24/2019    TRIG 159 (H) 01/24/2019     CT brain 12/2019:  Stable appearance of remote infarcts involving the Lack of insight into his health         ASSESSMENT     Diagnosis Orders   1. Occipital stroke ([facility] [address]     2. Vitamin B12 deficiency (non anemic)  Cyanocobalamin ER (RA VITAMIN B-12 TR) 1000 MCG TBCR    CBC Auto Differential   3. Essential hypertension  CBC Auto Differential   4. Vitamin B 12 deficiency  Kapil Cartagena MD, Hematology/Oncology, [facility]   5. Anticardiolipin antibody positive  Cyanocobalamin ER (RA VITAMIN B-12 TR) 1000 MCG TBCR    CBC Auto Differential    Beba Villegas MD, Hematology/Oncology, [facility]          PLAN:  Orders Placed This Encounter   Medications    atorvastatin (LIPITOR) 80 MG tablet     Sig: take 1 tablet by mouth once daily     Dispense:  30 tablet     Refill:  5    metoprolol tartrate (LOPRESSOR) 25 MG tablet     Sig: Take 0.5 tablets by mouth 2 times daily     Dispense:  60 tablet     Refill:  5    lisinopril (PRINIVIL;ZESTRIL) 5 MG tablet     Sig: Take 1 tablet by mouth daily     Dispense:  30 tablet     Refill:  5    aspirin 81 MG chewable tablet     Sig: Take 1 tablet by mouth daily     Dispense:  30 tablet     Refill:  3    Cyanocobalamin ER (RA VITAMIN B-12 TR) 1000 MCG TBCR     Sig: take 1 tablet by mouth once daily as directed     Dispense:  30 tablet     Refill:  3         1. Current smoker, not ready to quit smoking. BP not at goal, out of his medications. Refills provided  2. Needs to see Neurology and Hematology, referrals placed again  3. Consider vascular referral for carotid stenosis, ess than 75% occulusion at this time. Already prescribed a statin and daily ASA    FOLLOW UP AND INSTRUCTIONS:  Return in about 2 months (around 3/6/2020) for HTN. · Ricky received counseling on the following healthy behaviors:medication adherence and tobacco cessation    · Discussed use, benefit, and side effects of prescribed medications. Barriers to  medication compliance addressed. All patient questions answered.   Pt  verbalized understanding of all instructions given. · Patient given educational materials - see patient instructions      · Patient advised to contact scheduling offices for any referrals or imaging orders  placed today if they have not been contacted in 48 hours. Return in about 2 months (around 3/6/2020) for HTN. An electronic signature was used to authenticate this note.     --CESAR Huizar - CNP on 1/6/2020 at 4:55 PM

## 2020-01-06 NOTE — PATIENT INSTRUCTIONS
Jose  22., Neurology - MD [address]  [place] [facility]. [phone]

## 2020-01-08 ENCOUNTER — TELEPHONE (OUTPATIENT)
Dept: ONCOLOGY | Age: 65
End: 2020-01-08

## 2020-01-08 NOTE — TELEPHONE ENCOUNTER
LEFT A MESSAGE FOR EMERSON TO RETURN CALL TO SCHEDULE F/U APPOINTMENT THERE IS A CONSULT IN THE WORK QUE BUT PATIENT HAS BEEN SEEN BY DR Didi Maldonado NUMEROUS TIMES AWAINT CALL BACK

## 2020-01-23 ENCOUNTER — TELEPHONE (OUTPATIENT)
Dept: ONCOLOGY | Age: 65
End: 2020-01-23

## 2020-01-25 ENCOUNTER — APPOINTMENT (OUTPATIENT)
Dept: CT IMAGING | Age: 65
DRG: 077 | End: 2020-01-25
Payer: MEDICARE

## 2020-01-25 ENCOUNTER — APPOINTMENT (OUTPATIENT)
Dept: GENERAL RADIOLOGY | Age: 65
DRG: 077 | End: 2020-01-25
Payer: MEDICARE

## 2020-01-25 ENCOUNTER — HOSPITAL ENCOUNTER (INPATIENT)
Age: 65
LOS: 2 days | Discharge: HOME | DRG: 077 | End: 2020-01-27
Attending: EMERGENCY MEDICINE | Admitting: INTERNAL MEDICINE
Payer: MEDICARE

## 2020-01-25 PROBLEM — R41.82 ALTERED MENTAL STATUS, UNSPECIFIED: Status: ACTIVE | Noted: 2020-01-25

## 2020-01-25 LAB
-: NORMAL
-: NORMAL
ABSOLUTE EOS #: 0.04 K/UL (ref 0–0.44)
ABSOLUTE IMMATURE GRANULOCYTE: <0.03 K/UL (ref 0–0.3)
ABSOLUTE LYMPH #: 1.42 K/UL (ref 1.1–3.7)
ABSOLUTE MONO #: 0.88 K/UL (ref 0.1–1.2)
ALBUMIN SERPL-MCNC: 4.1 G/DL (ref 3.5–5.2)
ALBUMIN/GLOBULIN RATIO: 1 (ref 1–2.5)
ALLEN TEST: ABNORMAL
ALP BLD-CCNC: 95 U/L (ref 40–129)
ALT SERPL-CCNC: 18 U/L (ref 5–41)
AMMONIA: 82 UMOL/L (ref 16–60)
AMORPHOUS: NORMAL
AMPHETAMINE SCREEN URINE: NEGATIVE
ANION GAP SERPL CALCULATED.3IONS-SCNC: 12 MMOL/L (ref 9–17)
ANION GAP: 9 MMOL/L (ref 7–16)
AST SERPL-CCNC: 18 U/L
BACTERIA: NORMAL
BARBITURATE SCREEN URINE: NEGATIVE
BASOPHILS # BLD: 0 % (ref 0–2)
BASOPHILS ABSOLUTE: <0.03 K/UL (ref 0–0.2)
BENZODIAZEPINE SCREEN, URINE: NEGATIVE
BILIRUB SERPL-MCNC: 0.71 MG/DL (ref 0.3–1.2)
BILIRUBIN DIRECT: 0.18 MG/DL
BILIRUBIN URINE: NEGATIVE
BILIRUBIN, INDIRECT: 0.53 MG/DL (ref 0–1)
BNP INTERPRETATION: ABNORMAL
BUN BLDV-MCNC: 12 MG/DL (ref 8–23)
BUN/CREAT BLD: ABNORMAL (ref 9–20)
BUPRENORPHINE URINE: NORMAL
CALCIUM SERPL-MCNC: 8.7 MG/DL (ref 8.6–10.4)
CANNABINOID SCREEN URINE: NEGATIVE
CASTS UA: NORMAL /LPF (ref 0–8)
CHLORIDE BLD-SCNC: 103 MMOL/L (ref 98–107)
CO2: 24 MMOL/L (ref 20–31)
COCAINE METABOLITE, URINE: NEGATIVE
COLOR: YELLOW
COMMENT UA: ABNORMAL
CREAT SERPL-MCNC: 0.9 MG/DL (ref 0.7–1.2)
CRYSTALS, UA: NORMAL /HPF
DIFFERENTIAL TYPE: ABNORMAL
EOSINOPHILS RELATIVE PERCENT: 1 % (ref 1–4)
EPITHELIAL CELLS UA: NORMAL /HPF (ref 0–5)
FIO2: ABNORMAL
GFR AFRICAN AMERICAN: >60 ML/MIN
GFR NON-AFRICAN AMERICAN: >60 ML/MIN
GFR NON-AFRICAN AMERICAN: >60 ML/MIN
GFR SERPL CREATININE-BSD FRML MDRD: >60 ML/MIN
GFR SERPL CREATININE-BSD FRML MDRD: ABNORMAL ML/MIN/{1.73_M2}
GFR SERPL CREATININE-BSD FRML MDRD: ABNORMAL ML/MIN/{1.73_M2}
GFR SERPL CREATININE-BSD FRML MDRD: NORMAL ML/MIN/{1.73_M2}
GLOBULIN: NORMAL G/DL (ref 1.5–3.8)
GLUCOSE BLD-MCNC: 101 MG/DL (ref 74–100)
GLUCOSE BLD-MCNC: 141 MG/DL (ref 70–99)
GLUCOSE URINE: NEGATIVE
HCO3 VENOUS: 30.4 MMOL/L (ref 22–29)
HCT VFR BLD CALC: 45.5 % (ref 40.7–50.3)
HEMOGLOBIN: 15 G/DL (ref 13–17)
IMMATURE GRANULOCYTES: 0 %
INR BLD: 1
KETONES, URINE: NEGATIVE
LEUKOCYTE ESTERASE, URINE: NEGATIVE
LYMPHOCYTES # BLD: 16 % (ref 24–43)
MCH RBC QN AUTO: 30.4 PG (ref 25.2–33.5)
MCHC RBC AUTO-ENTMCNC: 33 G/DL (ref 28.4–34.8)
MCV RBC AUTO: 92.3 FL (ref 82.6–102.9)
MDMA URINE: NORMAL
METHADONE SCREEN, URINE: NEGATIVE
METHAMPHETAMINE, URINE: NORMAL
MODE: ABNORMAL
MONOCYTES # BLD: 10 % (ref 3–12)
MUCUS: NORMAL
NEGATIVE BASE EXCESS, VEN: ABNORMAL (ref 0–2)
NITRITE, URINE: NEGATIVE
NRBC AUTOMATED: 0 PER 100 WBC
O2 DEVICE/FLOW/%: ABNORMAL
O2 SAT, VEN: 78 % (ref 60–85)
OPIATES, URINE: NEGATIVE
OTHER OBSERVATIONS UA: NORMAL
OXYCODONE SCREEN URINE: NEGATIVE
PATIENT TEMP: ABNORMAL
PCO2, VEN: 58.8 MM HG (ref 41–51)
PDW BLD-RTO: 16.8 % (ref 11.8–14.4)
PH UA: 7 (ref 5–8)
PH VENOUS: 7.32 (ref 7.32–7.43)
PHENCYCLIDINE, URINE: NEGATIVE
PLATELET # BLD: 224 K/UL (ref 138–453)
PLATELET ESTIMATE: ABNORMAL
PMV BLD AUTO: 10.2 FL (ref 8.1–13.5)
PO2, VEN: 47.2 MM HG (ref 30–50)
POC CHLORIDE: 104 MMOL/L (ref 98–107)
POC CREATININE: 1.18 MG/DL (ref 0.51–1.19)
POC HEMATOCRIT: 47 % (ref 41–53)
POC HEMOGLOBIN: 15.9 G/DL (ref 13.5–17.5)
POC IONIZED CALCIUM: 1.13 MMOL/L (ref 1.15–1.33)
POC LACTIC ACID: 0.85 MMOL/L (ref 0.56–1.39)
POC PCO2 TEMP: ABNORMAL MM HG
POC PH TEMP: ABNORMAL
POC PO2 TEMP: ABNORMAL MM HG
POC POTASSIUM: 5.4 MMOL/L (ref 3.5–4.5)
POC SODIUM: 143 MMOL/L (ref 138–146)
POSITIVE BASE EXCESS, VEN: 2 (ref 0–3)
POTASSIUM SERPL-SCNC: 3.9 MMOL/L (ref 3.7–5.3)
PRO-BNP: 408 PG/ML
PROPOXYPHENE, URINE: NORMAL
PROTEIN UA: ABNORMAL
PROTHROMBIN TIME: 11 SEC (ref 9–12)
RBC # BLD: 4.93 M/UL (ref 4.21–5.77)
RBC # BLD: ABNORMAL 10*6/UL
RBC UA: NORMAL /HPF (ref 0–4)
REASON FOR REJECTION: NORMAL
RENAL EPITHELIAL, UA: NORMAL /HPF
SAMPLE SITE: ABNORMAL
SEG NEUTROPHILS: 73 % (ref 36–65)
SEGMENTED NEUTROPHILS ABSOLUTE COUNT: 6.33 K/UL (ref 1.5–8.1)
SODIUM BLD-SCNC: 139 MMOL/L (ref 135–144)
SPECIFIC GRAVITY UA: 1.02 (ref 1–1.03)
TEST INFORMATION: NORMAL
TOTAL CO2, VENOUS: 32 MMOL/L (ref 23–30)
TOTAL PROTEIN: 8.1 G/DL (ref 6.4–8.3)
TRICHOMONAS: NORMAL
TRICYCLIC ANTIDEPRESSANTS, UR: NORMAL
TROPONIN INTERP: NORMAL
TROPONIN T: NORMAL NG/ML
TROPONIN, HIGH SENSITIVITY: <6 NG/L (ref 0–22)
TSH SERPL DL<=0.05 MIU/L-ACNC: 1.27 MIU/L (ref 0.3–5)
TURBIDITY: CLEAR
URINE HGB: ABNORMAL
UROBILINOGEN, URINE: NORMAL
WBC # BLD: 8.7 K/UL (ref 3.5–11.3)
WBC # BLD: ABNORMAL 10*3/UL
WBC UA: NORMAL /HPF (ref 0–5)
YEAST: NORMAL
ZZ NTE CLEAN UP: ORDERED TEST: NORMAL
ZZ NTE WITH NAME CLEAN UP: SPECIMEN SOURCE: NORMAL

## 2020-01-25 PROCEDURE — 99285 EMERGENCY DEPT VISIT HI MDM: CPT

## 2020-01-25 PROCEDURE — 82140 ASSAY OF AMMONIA: CPT

## 2020-01-25 PROCEDURE — 85014 HEMATOCRIT: CPT

## 2020-01-25 PROCEDURE — 83605 ASSAY OF LACTIC ACID: CPT

## 2020-01-25 PROCEDURE — 85025 COMPLETE CBC W/AUTO DIFF WBC: CPT

## 2020-01-25 PROCEDURE — 84443 ASSAY THYROID STIM HORMONE: CPT

## 2020-01-25 PROCEDURE — 96365 THER/PROPH/DIAG IV INF INIT: CPT

## 2020-01-25 PROCEDURE — 80048 BASIC METABOLIC PNL TOTAL CA: CPT

## 2020-01-25 PROCEDURE — 82565 ASSAY OF CREATININE: CPT

## 2020-01-25 PROCEDURE — 96372 THER/PROPH/DIAG INJ SC/IM: CPT

## 2020-01-25 PROCEDURE — 70450 CT HEAD/BRAIN W/O DYE: CPT

## 2020-01-25 PROCEDURE — 96375 TX/PRO/DX INJ NEW DRUG ADDON: CPT

## 2020-01-25 PROCEDURE — 71045 X-RAY EXAM CHEST 1 VIEW: CPT

## 2020-01-25 PROCEDURE — 96366 THER/PROPH/DIAG IV INF ADDON: CPT

## 2020-01-25 PROCEDURE — 70498 CT ANGIOGRAPHY NECK: CPT

## 2020-01-25 PROCEDURE — 82435 ASSAY OF BLOOD CHLORIDE: CPT

## 2020-01-25 PROCEDURE — 93005 ELECTROCARDIOGRAM TRACING: CPT | Performed by: GENERAL PRACTICE

## 2020-01-25 PROCEDURE — 84484 ASSAY OF TROPONIN QUANT: CPT

## 2020-01-25 PROCEDURE — 80307 DRUG TEST PRSMV CHEM ANLYZR: CPT

## 2020-01-25 PROCEDURE — 80076 HEPATIC FUNCTION PANEL: CPT

## 2020-01-25 PROCEDURE — 99223 1ST HOSP IP/OBS HIGH 75: CPT | Performed by: PSYCHIATRY & NEUROLOGY

## 2020-01-25 PROCEDURE — 82803 BLOOD GASES ANY COMBINATION: CPT

## 2020-01-25 PROCEDURE — 2000000000 HC ICU R&B

## 2020-01-25 PROCEDURE — 6360000002 HC RX W HCPCS: Performed by: STUDENT IN AN ORGANIZED HEALTH CARE EDUCATION/TRAINING PROGRAM

## 2020-01-25 PROCEDURE — 82947 ASSAY GLUCOSE BLOOD QUANT: CPT

## 2020-01-25 PROCEDURE — 2580000003 HC RX 258: Performed by: EMERGENCY MEDICINE

## 2020-01-25 PROCEDURE — 2700000000 HC OXYGEN THERAPY PER DAY

## 2020-01-25 PROCEDURE — 83880 ASSAY OF NATRIURETIC PEPTIDE: CPT

## 2020-01-25 PROCEDURE — 2500000003 HC RX 250 WO HCPCS: Performed by: EMERGENCY MEDICINE

## 2020-01-25 PROCEDURE — 81001 URINALYSIS AUTO W/SCOPE: CPT

## 2020-01-25 PROCEDURE — 6360000004 HC RX CONTRAST MEDICATION: Performed by: EMERGENCY MEDICINE

## 2020-01-25 PROCEDURE — 82330 ASSAY OF CALCIUM: CPT

## 2020-01-25 PROCEDURE — 85610 PROTHROMBIN TIME: CPT

## 2020-01-25 PROCEDURE — G0480 DRUG TEST DEF 1-7 CLASSES: HCPCS

## 2020-01-25 PROCEDURE — 94770 HC ETCO2 MONITOR DAILY: CPT

## 2020-01-25 PROCEDURE — 84295 ASSAY OF SERUM SODIUM: CPT

## 2020-01-25 PROCEDURE — 84132 ASSAY OF SERUM POTASSIUM: CPT

## 2020-01-25 RX ORDER — HALOPERIDOL 5 MG/ML
5 INJECTION INTRAMUSCULAR ONCE
Status: COMPLETED | OUTPATIENT
Start: 2020-01-25 | End: 2020-01-25

## 2020-01-25 RX ORDER — HALOPERIDOL 5 MG/ML
5 INJECTION INTRAMUSCULAR ONCE
Status: COMPLETED | OUTPATIENT
Start: 2020-01-25 | End: 2020-01-26

## 2020-01-25 RX ORDER — FUROSEMIDE 10 MG/ML
40 INJECTION INTRAMUSCULAR; INTRAVENOUS ONCE
Status: COMPLETED | OUTPATIENT
Start: 2020-01-25 | End: 2020-01-25

## 2020-01-25 RX ORDER — NALOXONE HYDROCHLORIDE 1 MG/ML
INJECTION INTRAMUSCULAR; INTRAVENOUS; SUBCUTANEOUS
Status: DISPENSED
Start: 2020-01-25 | End: 2020-01-26

## 2020-01-25 RX ADMIN — FUROSEMIDE 40 MG: 10 INJECTION, SOLUTION INTRAMUSCULAR; INTRAVENOUS at 21:51

## 2020-01-25 RX ADMIN — SODIUM CHLORIDE 2.5 MG/HR: 9 INJECTION, SOLUTION INTRAVENOUS at 18:43

## 2020-01-25 RX ADMIN — HALOPERIDOL LACTATE 5 MG: 5 INJECTION, SOLUTION INTRAMUSCULAR at 20:17

## 2020-01-25 RX ADMIN — IOHEXOL 90 ML: 350 INJECTION, SOLUTION INTRAVENOUS at 18:25

## 2020-01-25 NOTE — ED NOTES
Writer spoke on the phone to pharmacy to send cardene to ED [facility], RN  01/25/20 [id number]

## 2020-01-25 NOTE — ED NOTES
Pt increased to 6L nasal cannula from 4L d/t desatting to 88%.    Writer informed Dr. aDgoberto Wells, concern that pt may need intubated      [facility], RN  01/25/20 0800

## 2020-01-25 NOTE — ED NOTES
[facility] phone number [phone]    Please call with any updates     Juani Spencer RN  01/25/20 [id number]

## 2020-01-25 NOTE — CONSULTS
Department of Endovascular Neurosurgery                                         Resident Consult Note    Reason for Consult: Altered mentation  Requesting Physician:  Dr. Zeanida Kennedy  Endovascular Neurosurgeon:   [x]Dr. Pedro Pablo Chowdary  []Dr. Vernia Lundborg  []Dr. Shabnam Yu  []Dr. Simone Palencia  []     History Obtained From:  domestic partner, electronic medical record    CHIEF COMPLAINT:       Altered mentation    HISTORY OF PRESENT ILLNESS:       The patient is a 71 y.o. male who presents with gradually progressing confusion and intermittent vision loss. Started about 2 pm today per girlfriend. No loss of consciousness, headache, seizure-like activity or incontinence noted. He was brought in to the ED, where he was able to answer all questions and move all his extremities to command. However, he became progressively worse, now unable to follow commands, speak or move. On arrival, his BP was 201/107. O2 saturation 98%, which have since dropped into the mid-high 80s. He was started on Cardene and non-re breather mask respectively. H/o bilateral cerebellar, occipital infarcts with left homonomous hemianopsia. Past EEG normal. PUD, AVM of duodenum and acute lower GI bleeding (2016). PAST MEDICAL HISTORY :       Past Medical History:        Diagnosis Date    Adenomatous colon polyp 2016    Arthritis     CVA [facility]cerebral vascular accident) ([facility] [address] 03/2016    Diverticulosis of colon     History of colon polyps 2016    Hyperlipidemia     Hypertension     Internal carotid artery stenosis, right: 50 to 69% 12/19/2019    [facility]ocardial infarction[facility] 2011    Poor historian     Transient ischemic attack (TIA) 12/19/2019       Past Surgical History:        Procedure Laterality Date    CARDIAC SURGERY  2010    VISION HEART STENT MRI CONDTIONAL 3T OK.      COLON SURGERY Right 12/15/15    COLONOSCOPY  07/25/15    HYPERPLASTIC AND TUBULAR ADENOMA     COLONOSCOPY  12/15/15    SERRATED ADENOMA    COLONOSCOPY  03-10-16    Severe Current Facility-Administered Medications: naloxone (NARCAN) 2 MG/2ML injection, , ,   niCARdipine (CARDENE) 25 mg in sodium chloride 0.9 % 250 mL infusion, 5 mg/hr, Intravenous, Continuous  Facility-Administered Medications Ordered in Other Encounters: acetaminophen (OFIRMEV) infusion 1,000 mg, 1,000 mg, Intravenous, Once    REVIEW OF SYSTEMS:       CONSTITUTIONAL: negative for fatigue and malaise   EYES: negative for double vision and photophobia    HEENT: negative for tinnitus and sore throat   RESPIRATORY: negative for cough, shortness of breath   CARDIOVASCULAR: negative for chest pain, palpitations   GASTROINTESTINAL: negative for nausea, vomiting   GENITOURINARY: negative for incontinence   MUSCULOSKELETAL: negative for neck or back pain   NEUROLOGICAL: negative for seizures   PSYCHIATRIC: negative for fatigue     Review of systems otherwise negative. PHYSICAL EXAM:       BP (!) 176/98   Pulse 81   Temp 97.5 °F (36.4 °C) (Oral)   Resp 19   Ht 5' 9\" (1.753 m)   Wt 185 lb (83.9 kg)   SpO2 94%   BMI 27.32 kg/m²     CONSTITUTIONAL:  Well developed, well nourished, obtunded, in no acute distress.     HEAD:  normocephalic, atraumatic    EYES:  PERRLA, EOMI.   ENT:  moist mucous membranes   NECK:  supple, symmetric, no midline tenderness to palpation    BACK:  without midline tenderness, step-offs or deformities    LUNGS:  Equal air entry bilaterally   CARDIOVASCULAR:  normal s1 / s2   ABDOMEN:  Soft, no rigidity   NEUROLOGIC:  Mental Status:  Not oriented to date, Not oriented to person and Not oriented to place,somnolent             Cranial Nerves:    cranial nerves II-XII are grossly intact    Motor Exam:    Drift:  absent  Tone:  normal    Does not follow commands, however moves all limbs    Sensory:    Touch:    Right Upper Extremity:  normal  Left Upper Extremity:  normal  Right Lower Extremity:  normal  Left Lower Extremity:  normal    Deep Tendon Reflexes:    Right Bicep:  2+  Left Bicep: DIFFTYPE NOT REPORTED 01/25/2020     BMP:    Lab Results   Component Value Date     12/19/2019    K 4.1 12/19/2019     12/19/2019    CO2 22 12/19/2019    BUN 10 12/19/2019    LABALBU 3.2 12/19/2019    LABALBU 4.0 09/20/2011    CREATININE 1.18 01/25/2020    CREATININE 0.96 12/19/2019    CALCIUM 8.0 12/19/2019    GFRAA >60 12/19/2019    LABGLOM >60 01/25/2020    GLUCOSE 99 12/19/2019    GLUCOSE 116 09/20/2011       Radiology Review:      CTA Head/Neck, CT head 1/25/2020  Impression:     1.  No intracranial proximal large artery focal high-grade stenosis or  occlusion. 2.  No hemodynamically significant stenosis in the bilateral cervical  internal carotid arteries per NASCET criteria.  Right greater than left  carotid bulb atherosclerotic plaque. 3.  No acute intracranial hemorrhage.  Small patchy area of hypoattenuation  in the left insular/subinsular region was not definitely seen in the prior  study and could relate to artifact versus acute ischemia.  Underlying  neoplastic or infectious process is considered less likely.  Recommend  follow-up brain MRI with and without intravenous contrast for further  evaluation. 4.  Mild chronic small vessel ischemic disease. 11.  Stable old bilateral occipital, left parietal and bilateral cerebellar  infarcts.            ASSESSMENT AND PLAN:       Patient Active Problem List   Diagnosis    Hyperlipidemia    GERD (gastroesophageal reflux disease)    Dizzy    Epigastric pain    Upper GI bleed    Blood loss anemia    GI bleed    Sigmoid polyp    Gastritis    Acute blood loss anemia    Gastric ulcer    Gastrointestinal hemorrhage    LEVI (acute kidney injury) ([address]    Mild malnutrition (HCC)    Tobacco use    Colon polyp    Hypertension    Hyperglycemia    Hypokalemia    Severe anemia    Acute lower GI bleeding    Coronary artery disease involving native coronary artery of native heart without angina pectoris    Benign essential HTN    Acute gastric ulcer with hemorrhage    Gastrointestinal hemorrhage with hematemesis    AVM (arteriovenous malformation) of duodenum, acquired    History of colon polyps    Adenomatous colon polyp    Hematemesis    Chronic fatigue    PUD (peptic ulcer disease)    Anemia    Diverticulosis of large intestine without hemorrhage    Arthritis of knee    Cerebellar cerebrovascular accident (CVA) without late effect    Syncope and collapse    Multi-infarct state    Left homonymous hemianopsia    Occipital infarction (HCC)    Visual impairment severe bilaterally    Hypotension due to blood loss    Acute ischemic stroke (HCC)    Hypotension    Symptomatic anemia    History of GI bleed    Iron deficiency anemia    Prolonged Q-T interval on ECG    Memory loss of unknown cause    Visual disturbance    Vision loss, bilateral    Vertebrobasilar insufficiency    History of CVA (cerebrovascular accident)    Cerebellar infarct ([address]    Left temporal lobe infarction (HCC)    Stroke-like symptom    Cataracts, bilateral    Transient ischemic attack (TIA)    Internal carotid artery stenosis, right: 50 to 69%       71 y.o. male who presents with altered mentation and intermittent vision loss. H/o occipital, cerebellar infarcts in the past. Recent imaging today showing no acute infarcts. Could be manifestations from prior infarct or worsening; will f.u with MRI. Metabolic encephalopathy vs hypertensive emergency. Will also monitor for seizure-like activity which are common with occipital strokes. - Discussed with Dr. Nelson Willoughby   - MRI Brain WO   - ASA 81.  No Plavix given history of GI bleed   - Lipitor 80mg nightly    - PT, OT, Speech eval    - Hydrate with 500 cc bolus then IVF NS @ 75cc/hr    - Telemetry    - Neuro checks per protocol  - We recommend SBP below 200  - Blood glucose goal less than 180  - Please avoid dextrose containing solutions    Additional recommendations may follow    Please contact EV

## 2020-01-25 NOTE — ED NOTES
Dr. Salma Nayak, neuro resident, and Gregorio Held, neuro alert nurse at bedside with pt in [address] [place] [address]  01/25/20 [id number]

## 2020-01-25 NOTE — ED NOTES
Pt returned to ED 27 from CT scan.     Dr. Devante Amaro and Seth Farris, remain at bedside      [facility], [address] [place] [address]  01/25/20 [id number]

## 2020-01-25 NOTE — ED PROVIDER NOTES
Historical Provider, MD   atorvastatin (LIPITOR) 80 MG tablet take 1 tablet by mouth once daily 1/6/20   CESAR Steinberg CNP   metoprolol tartrate (LOPRESSOR) 25 MG tablet Take 0.5 tablets by mouth 2 times daily 1/6/20   CESAR Steinberg CNP   lisinopril (PRINIVIL;ZESTRIL) 5 MG tablet Take 1 tablet by mouth daily 1/6/20   CESAR Steinberg CNP   aspirin 81 MG chewable tablet Take 1 tablet by mouth daily 1/6/20   CESAR Steinberg CNP   Cyanocobalamin ER (RA VITAMIN B-12 TR) 1000 MCG TBCR take 1 tablet by mouth once daily as directed 1/6/20   CESAR Steinberg CNP   nicotine (NICODERM CQ) 21 MG/24HR Place 1 patch onto the skin daily as needed (if patient smokes)  Patient not taking: Reported on 1/6/2020 12/19/19   Jarett Rings, DO   omeprazole (PRILOSEC) 20 MG delayed release capsule Take 20 mg by mouth daily    Historical Provider, MD   magnesium oxide (MAG-OX) 400 (241.3 Mg) MG TABS tablet take 1 tablet by mouth once daily 12/3/18   CESAR Steinberg CNP       REVIEW OF SYSTEMS    (2-9 systems for level 4, 10 or more for level 5)      Review of Systems   Unable to perform ROS: Mental status change           PHYSICAL EXAM   (up to 7 for level 4, 8 or more for level 5)      INITIAL VITALS:   BP (!) 176/98   Pulse 81   Temp 97.5 °F (36.4 °C) (Oral)   Resp 19   Ht 5' 9\" (1.753 m)   Wt 185 lb (83.9 kg)   SpO2 94%   BMI 27.32 kg/m²     Physical Exam   Gen. Appearance: somnolent, waking only to loud noise. HEENT: head atraumatic, normocephalic. Pupils 2 bilaterally reactive to light. Extraocular movements intact. Oropharynx clear and moist.  Neck: Supple, normal range of motion. No lymphadenopathy. Pulmonary: Lungs clear to auscultation bilaterally. Equal air entry right left. Cardiovascular:  Heart sounds normal, no murmurs. Peripheral pulses strong, regular, equal.   Abdomen: Soft, nontender, nondistended. Bowel sounds normal. No palpable masses.    Neurology: GCS 14. Oriented to person and place but significant delays in response to questions. Normal tone and power in all 4 extremities. No sensory deficits.     Skin: Warm, dry, well perfused mild edema bilateral lower extremities      DIFFERENTIAL  DIAGNOSIS     PLAN (LABS / IMAGING / EKG):  Orders Placed This Encounter   Procedures    CT Head WO Contrast    CTA HEAD NECK W CONTRAST    XR CHEST PORTABLE    MRI BRAIN WO CONTRAST    Hemoglobin and hematocrit, blood    SODIUM (POC)    POTASSIUM (POC)    CHLORIDE (POC)    CALCIUM, IONIC (POC)    CBC Auto Differential    URINALYSIS    SPECIMEN REJECTION    Basic Metabolic Panel    PREVIOUS SPECIMEN    TSH without Reflex    AMMONIA    Brain Natriuretic Peptide    Troponin    Microscopic Urinalysis    Inpatient consult to Critical Care    Venous Blood Gas, POC    Creatinine W/GFR Point of Care    Lactic Acid, POC    POCT Glucose    Anion Gap (Calc) POC    EKG 12 Lead       MEDICATIONS ORDERED:  Orders Placed This Encounter   Medications    naloxone (NARCAN) 2 MG/2ML injection     Tamara Pisano N: cabinet override    DISCONTD: niCARdipine (CARDENE) 20 mg in 0.9 % sodium chloride 200 mL infusion    iohexol (OMNIPAQUE 350) solution 90 mL    niCARdipine (CARDENE) 25 mg in sodium chloride 0.9 % 250 mL infusion           DIAGNOSTIC RESULTS / EMERGENCY DEPARTMENT COURSE / MDM     LABS:  Results for orders placed or performed during the hospital encounter of 01/25/20   Hemoglobin and hematocrit, blood   Result Value Ref Range    POC Hemoglobin 15.9 13.5 - 17.5 g/dL    POC Hematocrit 47 41 - 53 %   SODIUM (POC)   Result Value Ref Range    POC Sodium 143 138 - 146 mmol/L   POTASSIUM (POC)   Result Value Ref Range    POC Potassium 5.4 (H) 3.5 - 4.5 mmol/L   CHLORIDE (POC)   Result Value Ref Range    POC Chloride 104 98 - 107 mmol/L   CALCIUM, IONIC (POC)   Result Value Ref Range    POC Ionized Calcium 1.13 (L) 1.15 - 1.33 mmol/L   CBC Auto Differential Result Value Ref Range    WBC 8.7 3.5 - 11.3 k/uL    RBC 4.93 4.21 - 5.77 m/uL    Hemoglobin 15.0 13.0 - 17.0 g/dL    Hematocrit 45.5 40.7 - 50.3 %    MCV 92.3 82.6 - 102.9 fL    MCH 30.4 25.2 - 33.5 pg    MCHC 33.0 28.4 - 34.8 g/dL    RDW 16.8 (H) 11.8 - 14.4 %    Platelets 818 720 - 792 k/uL    MPV 10.2 8.1 - 13.5 fL    NRBC Automated 0.0 0.0 per 100 WBC    Differential Type NOT REPORTED     Seg Neutrophils 73 (H) 36 - 65 %    Lymphocytes 16 (L) 24 - 43 %    Monocytes 10 3 - 12 %    Eosinophils % 1 1 - 4 %    Basophils 0 0 - 2 %    Immature Granulocytes 0 0 %    Segs Absolute 6.33 1.50 - 8.10 k/uL    Absolute Lymph # 1.42 1.10 - 3.70 k/uL    Absolute Mono # 0.88 0.10 - 1.20 k/uL    Absolute Eos # 0.04 0.00 - 0.44 k/uL    Basophils Absolute <0.03 0.00 - 0.20 k/uL    Absolute Immature Granulocyte <0.03 0.00 - 0.30 k/uL    WBC Morphology NOT REPORTED     RBC Morphology ANISOCYTOSIS PRESENT     Platelet Estimate NOT REPORTED    URINALYSIS   Result Value Ref Range    Color, UA YELLOW YELLOW    Turbidity UA CLEAR CLEAR    Glucose, Ur NEGATIVE NEGATIVE    Bilirubin Urine NEGATIVE NEGATIVE    Ketones, Urine NEGATIVE NEGATIVE    Specific Gravity, UA 1.017 1.005 - 1.030    Urine Hgb MODERATE (A) NEGATIVE    pH, UA 7.0 5.0 - 8.0    Protein, UA 2+ (A) NEGATIVE    Urobilinogen, Urine Normal Normal    Nitrite, Urine NEGATIVE NEGATIVE    Leukocyte Esterase, Urine NEGATIVE NEGATIVE    Urinalysis Comments NOT REPORTED    SPECIMEN REJECTION   Result Value Ref Range    Specimen Source . BLOOD     Ordered Test BMP TSH     Reason for Rejection Unable to perform testing: Specimen hemolyzed. - NOT REPORTED    Microscopic Urinalysis   Result Value Ref Range    -          WBC, UA 0 TO 2 0 - 5 /HPF    RBC, UA 5 TO 10 0 - 4 /HPF    Casts UA  0 - 8 /LPF     2 TO 5 HYALINE Reference range defined for non-centrifuged specimen.     Crystals UA NOT REPORTED None /HPF    Epithelial Cells UA 2 TO 5 0 - 5 /HPF    Renal Epithelial, Urine NOT REPORTED 0 /HPF    Bacteria, UA NOT REPORTED None    Mucus, UA NOT REPORTED None    Trichomonas, UA NOT REPORTED None    Amorphous, UA NOT REPORTED None    Other Observations UA NOT REPORTED NOT REQ. Yeast, UA NOT REPORTED None   Venous Blood Gas, POC   Result Value Ref Range    pH, Tino 7.322 7.320 - 7.430    pCO2, Tino 58.8 (H) 41.0 - 51.0 mm Hg    pO2, Tino 47.2 30.0 - 50.0 mm Hg    HCO3, Venous 30.4 (H) 22.0 - 29.0 mmol/L    Total CO2, Venous 32 (H) 23.0 - 30.0 mmol/L    Negative Base Excess, Tino NOT REPORTED 0.0 - 2.0    Positive Base Excess, Tino 2 0.0 - 3.0    O2 Sat, Tino 78 60.0 - 85.0 %    O2 Device/Flow/% NOT REPORTED     Bruce Test NOT REPORTED     Sample Site NOT REPORTED     Mode NOT REPORTED     FIO2 NOT REPORTED     Pt Temp NOT REPORTED     POC pH Temp NOT REPORTED     POC pCO2 Temp NOT REPORTED mm Hg    POC pO2 Temp NOT REPORTED mm Hg   Creatinine W/GFR Point of Care   Result Value Ref Range    POC Creatinine 1.18 0.51 - 1.19 mg/dL    GFR Comment >60 >60 mL/min    GFR Non-African American >60 >60 mL/min    GFR Comment         Lactic Acid, POC   Result Value Ref Range    POC Lactic Acid 0.85 0.56 - 1.39 mmol/L   POCT Glucose   Result Value Ref Range    POC Glucose 101 (H) 74 - 100 mg/dL   Anion Gap (Calc) POC   Result Value Ref Range    Anion Gap 9 7 - 16 mmol/L       RADIOLOGY:  None    EKG  None    All EKG's are interpreted by the Emergency Department Physician who either signs or Co-signs this chart in the absence of a cardiologist.    EMERGENCY DEPARTMENT COURSE:  71 y.o. male who presents complaints of vision loss, altered mental status. Quick cognitive decline after arrival to the ER. Stroke alert called, with CT brain negative for brain bleed. Patient hypertensive to 201, responded moderately to labetalol. Work-up in the ED negative for stroke. This point patient remained confused and agitated, and was transferred to the ICU for hypertensive encephalopathy on a Cardene drip.         ED Patient counseled on the use of alternating Motrin and Tylenol should they develop a fever at home. Patient verbalized understanding and agreement with plan. Discharged to home in stable condition and in no distress. PROCEDURES:  None    CONSULTS:  IP CONSULT TO CRITICAL CARE    CRITICAL CARE:  None    FINAL IMPRESSION      1. Hypertensive encephalopathy              DISPOSITION / PLAN     DISPOSITION Decision To Admit 01/25/2020 07:31:19 PM      PATIENT REFERRED TO:  No follow-up provider specified.     DISCHARGE MEDICATIONS:  New Prescriptions    No medications on file       Vermell Buerger, DO  Emergency Medicine Resident    (Please note that portions of thisnote were completed with a voice recognition program.  Efforts were made to edit the dictations but occasionally words are mis-transcribed.)      Vermell Buerger, DO  Resident  01/31/20 [facility],   Resident  01/31/20 [id number]

## 2020-01-25 NOTE — ED PROVIDER NOTES
[facility] ED     Emergency Department     Faculty Attestation    I performed a history and physical examination of the patient and discussed management with the resident. I reviewed the residents note and agree with the documented findings and plan of care. Any areas of disagreement are noted on the chart. I was personally present for the key portions of any procedures. I have documented in the chart those procedures where I was not present during the key portions. I have reviewed the emergency nurses triage note. I agree with the chief complaint, past medical history, past surgical history, allergies, medications, social and family history as documented unless otherwise noted below. For Physician Assistant/ Nurse Practitioner cases/documentation I have personally evaluated this patient and have completed at least one if not all key elements of the E/M (history, physical exam, and MDM). Additional findings are as noted. Patient brought in by family for concerns of a vision issues for the last month. However between triage and resident evaluation patient did have new onset confusion. He does orient to voice does recognize his daughter. Denies headache. Has small 2 mm pupils reactive no response to Narcan. Moving all extremities equally but will not participate in a detailed neuro exam.  Normal blood sugar. Mild elevation of PCO2 on the point-of-care testing. Will emergently take to CT, begin to control blood pressure with labetalol, reevaluate, anticipate admission    6:17 PM  As patient was returning from CT he was less responsive. Head is deviated to the left. Strong gag. Will not follow commands now at all. No drooling is protecting his airway. Given this rapid change here prior history of strokes, will upgrade to a stroke alert start Cardene for blood pressure    7:36 PM  Patient's mental status has improved somewhat is now speaking again but still confused.   Did voice that he

## 2020-01-25 NOTE — ED NOTES
[facility] running      [facility] [place], [address] [place] [address]  01/25/20 [id number]

## 2020-01-26 ENCOUNTER — APPOINTMENT (OUTPATIENT)
Dept: GENERAL RADIOLOGY | Age: 65
DRG: 077 | End: 2020-01-26
Payer: MEDICARE

## 2020-01-26 ENCOUNTER — APPOINTMENT (OUTPATIENT)
Dept: MRI IMAGING | Age: 65
DRG: 077 | End: 2020-01-26
Payer: MEDICARE

## 2020-01-26 ENCOUNTER — APPOINTMENT (OUTPATIENT)
Dept: CT IMAGING | Age: 65
DRG: 077 | End: 2020-01-26
Payer: MEDICARE

## 2020-01-26 PROBLEM — I67.4 HYPERTENSIVE ENCEPHALOPATHY: Status: ACTIVE | Noted: 2020-01-25

## 2020-01-26 PROBLEM — R29.90 STROKE-LIKE SYMPTOM: Status: RESOLVED | Noted: 2019-12-18 | Resolved: 2020-01-26

## 2020-01-26 PROBLEM — G45.9 TRANSIENT ISCHEMIC ATTACK (TIA): Status: RESOLVED | Noted: 2019-12-19 | Resolved: 2020-01-26

## 2020-01-26 PROBLEM — I50.22 CHRONIC SYSTOLIC CHF (CONGESTIVE HEART FAILURE) (HCC): Status: ACTIVE | Noted: 2020-01-26

## 2020-01-26 PROBLEM — H54.3 VISION LOSS, BILATERAL: Status: RESOLVED | Noted: 2019-12-18 | Resolved: 2020-01-26

## 2020-01-26 LAB
-: NORMAL
ABSOLUTE EOS #: <0.03 K/UL (ref 0–0.44)
ABSOLUTE IMMATURE GRANULOCYTE: 0.04 K/UL (ref 0–0.3)
ABSOLUTE LYMPH #: 0.94 K/UL (ref 1.1–3.7)
ABSOLUTE MONO #: 0.88 K/UL (ref 0.1–1.2)
ACETAMINOPHEN LEVEL: <5 UG/ML (ref 10–30)
ALLEN TEST: ABNORMAL
AMMONIA: 62 UMOL/L (ref 16–60)
ANION GAP SERPL CALCULATED.3IONS-SCNC: 9 MMOL/L (ref 9–17)
BASOPHILS # BLD: 0 % (ref 0–2)
BASOPHILS ABSOLUTE: <0.03 K/UL (ref 0–0.2)
BNP INTERPRETATION: ABNORMAL
BUN BLDV-MCNC: 13 MG/DL (ref 8–23)
BUN/CREAT BLD: ABNORMAL (ref 9–20)
CALCIUM IONIZED: 1.11 MMOL/L (ref 1.13–1.33)
CALCIUM SERPL-MCNC: 8.5 MG/DL (ref 8.6–10.4)
CARBOXYHEMOGLOBIN: 1.6 % (ref 0–5)
CHLORIDE BLD-SCNC: 105 MMOL/L (ref 98–107)
CHOLESTEROL/HDL RATIO: 5.9
CHOLESTEROL: 178 MG/DL
CO2: 25 MMOL/L (ref 20–31)
CREAT SERPL-MCNC: 1 MG/DL (ref 0.7–1.2)
DIFFERENTIAL TYPE: ABNORMAL
EOSINOPHILS RELATIVE PERCENT: 0 % (ref 1–4)
ETHANOL PERCENT: <0.01 %
ETHANOL: <10 MG/DL
FIO2: ABNORMAL
GFR AFRICAN AMERICAN: >60 ML/MIN
GFR NON-AFRICAN AMERICAN: >60 ML/MIN
GFR SERPL CREATININE-BSD FRML MDRD: ABNORMAL ML/MIN/{1.73_M2}
GFR SERPL CREATININE-BSD FRML MDRD: ABNORMAL ML/MIN/{1.73_M2}
GLUCOSE BLD-MCNC: 115 MG/DL (ref 70–99)
HCO3 VENOUS: 28.7 MMOL/L (ref 24–30)
HCT VFR BLD CALC: 43.1 % (ref 40.7–50.3)
HDLC SERPL-MCNC: 30 MG/DL
HEMOGLOBIN: 14.5 G/DL (ref 13–17)
IMMATURE GRANULOCYTES: 0 %
LACTIC ACID, WHOLE BLOOD: 1.1 MMOL/L (ref 0.7–2.1)
LACTIC ACID: NORMAL MMOL/L
LDL CHOLESTEROL: 132 MG/DL (ref 0–130)
LYMPHOCYTES # BLD: 9 % (ref 24–43)
MAGNESIUM: 2 MG/DL (ref 1.6–2.6)
MCH RBC QN AUTO: 32.4 PG (ref 25.2–33.5)
MCHC RBC AUTO-ENTMCNC: 33.6 G/DL (ref 28.4–34.8)
MCV RBC AUTO: 96.4 FL (ref 82.6–102.9)
METHEMOGLOBIN: ABNORMAL % (ref 0–1.5)
MODE: ABNORMAL
MONOCYTES # BLD: 9 % (ref 3–12)
NEGATIVE BASE EXCESS, VEN: ABNORMAL MMOL/L (ref 0–2)
NOTIFICATION TIME: ABNORMAL
NOTIFICATION: ABNORMAL
NRBC AUTOMATED: 0 PER 100 WBC
O2 DEVICE/FLOW/%: ABNORMAL
O2 SAT, VEN: 76.9 % (ref 60–85)
OXYHEMOGLOBIN: ABNORMAL % (ref 95–98)
PATIENT TEMP: 37
PCO2, VEN, TEMP ADJ: ABNORMAL MMHG (ref 39–55)
PCO2, VEN: 56.5 (ref 39–55)
PDW BLD-RTO: 17.8 % (ref 11.8–14.4)
PEEP/CPAP: ABNORMAL
PH VENOUS: 7.33 (ref 7.32–7.42)
PH, VEN, TEMP ADJ: ABNORMAL (ref 7.32–7.42)
PHOSPHORUS: 4.1 MG/DL (ref 2.5–4.5)
PLATELET # BLD: 179 K/UL (ref 138–453)
PLATELET ESTIMATE: ABNORMAL
PMV BLD AUTO: 10 FL (ref 8.1–13.5)
PO2, VEN, TEMP ADJ: ABNORMAL MMHG (ref 30–50)
PO2, VEN: 42.4 (ref 30–50)
POSITIVE BASE EXCESS, VEN: 1.8 MMOL/L (ref 0–2)
POTASSIUM SERPL-SCNC: 4 MMOL/L (ref 3.7–5.3)
PRO-BNP: 596 PG/ML
PSV: ABNORMAL
PT. POSITION: ABNORMAL
RBC # BLD: 4.47 M/UL (ref 4.21–5.77)
RBC # BLD: ABNORMAL 10*6/UL
REASON FOR REJECTION: NORMAL
RESPIRATORY RATE: ABNORMAL
SALICYLATE LEVEL: <1 MG/DL (ref 3–10)
SAMPLE SITE: ABNORMAL
SEG NEUTROPHILS: 82 % (ref 36–65)
SEGMENTED NEUTROPHILS ABSOLUTE COUNT: 8.39 K/UL (ref 1.5–8.1)
SET RATE: ABNORMAL
SODIUM BLD-SCNC: 139 MMOL/L (ref 135–144)
TEXT FOR RESPIRATORY: ABNORMAL
TOTAL HB: ABNORMAL G/DL (ref 12–16)
TOTAL RATE: ABNORMAL
TOXIC TRICYCLIC SC,BLOOD: NEGATIVE
TRIGL SERPL-MCNC: 82 MG/DL
VLDLC SERPL CALC-MCNC: ABNORMAL MG/DL (ref 1–30)
VT: ABNORMAL
WBC # BLD: 10.3 K/UL (ref 3.5–11.3)
WBC # BLD: ABNORMAL 10*3/UL
ZZ NTE CLEAN UP: ORDERED TEST: NORMAL
ZZ NTE WITH NAME CLEAN UP: SPECIMEN SOURCE: NORMAL

## 2020-01-26 PROCEDURE — 2700000000 HC OXYGEN THERAPY PER DAY

## 2020-01-26 PROCEDURE — 6360000002 HC RX W HCPCS: Performed by: STUDENT IN AN ORGANIZED HEALTH CARE EDUCATION/TRAINING PROGRAM

## 2020-01-26 PROCEDURE — 87641 MR-STAPH DNA AMP PROBE: CPT

## 2020-01-26 PROCEDURE — 82330 ASSAY OF CALCIUM: CPT

## 2020-01-26 PROCEDURE — 71045 X-RAY EXAM CHEST 1 VIEW: CPT

## 2020-01-26 PROCEDURE — 2500000003 HC RX 250 WO HCPCS: Performed by: STUDENT IN AN ORGANIZED HEALTH CARE EDUCATION/TRAINING PROGRAM

## 2020-01-26 PROCEDURE — 99291 CRITICAL CARE FIRST HOUR: CPT | Performed by: INTERNAL MEDICINE

## 2020-01-26 PROCEDURE — 70551 MRI BRAIN STEM W/O DYE: CPT

## 2020-01-26 PROCEDURE — 6370000000 HC RX 637 (ALT 250 FOR IP): Performed by: STUDENT IN AN ORGANIZED HEALTH CARE EDUCATION/TRAINING PROGRAM

## 2020-01-26 PROCEDURE — 94761 N-INVAS EAR/PLS OXIMETRY MLT: CPT

## 2020-01-26 PROCEDURE — 71260 CT THORAX DX C+: CPT

## 2020-01-26 PROCEDURE — 1200000000 HC SEMI PRIVATE

## 2020-01-26 PROCEDURE — 83605 ASSAY OF LACTIC ACID: CPT

## 2020-01-26 PROCEDURE — 80061 LIPID PANEL: CPT

## 2020-01-26 PROCEDURE — 82140 ASSAY OF AMMONIA: CPT

## 2020-01-26 PROCEDURE — 84100 ASSAY OF PHOSPHORUS: CPT

## 2020-01-26 PROCEDURE — 2580000003 HC RX 258: Performed by: STUDENT IN AN ORGANIZED HEALTH CARE EDUCATION/TRAINING PROGRAM

## 2020-01-26 PROCEDURE — 80048 BASIC METABOLIC PNL TOTAL CA: CPT

## 2020-01-26 PROCEDURE — 92523 SPEECH SOUND LANG COMPREHEN: CPT

## 2020-01-26 PROCEDURE — 83735 ASSAY OF MAGNESIUM: CPT

## 2020-01-26 PROCEDURE — 82805 BLOOD GASES W/O2 SATURATION: CPT

## 2020-01-26 PROCEDURE — 6360000004 HC RX CONTRAST MEDICATION: Performed by: STUDENT IN AN ORGANIZED HEALTH CARE EDUCATION/TRAINING PROGRAM

## 2020-01-26 PROCEDURE — 83880 ASSAY OF NATRIURETIC PEPTIDE: CPT

## 2020-01-26 PROCEDURE — 36415 COLL VENOUS BLD VENIPUNCTURE: CPT

## 2020-01-26 PROCEDURE — 83036 HEMOGLOBIN GLYCOSYLATED A1C: CPT

## 2020-01-26 PROCEDURE — 99222 1ST HOSP IP/OBS MODERATE 55: CPT | Performed by: STUDENT IN AN ORGANIZED HEALTH CARE EDUCATION/TRAINING PROGRAM

## 2020-01-26 PROCEDURE — 94640 AIRWAY INHALATION TREATMENT: CPT

## 2020-01-26 PROCEDURE — 85025 COMPLETE CBC W/AUTO DIFF WBC: CPT

## 2020-01-26 RX ORDER — SODIUM CHLORIDE 0.9 % (FLUSH) 0.9 %
10 SYRINGE (ML) INJECTION EVERY 12 HOURS SCHEDULED
Status: DISCONTINUED | OUTPATIENT
Start: 2020-01-26 | End: 2020-01-27 | Stop reason: HOSPADM

## 2020-01-26 RX ORDER — ATORVASTATIN CALCIUM 40 MG/1
40 TABLET, FILM COATED ORAL DAILY
Status: DISCONTINUED | OUTPATIENT
Start: 2020-01-26 | End: 2020-01-26

## 2020-01-26 RX ORDER — FUROSEMIDE 10 MG/ML
40 INJECTION INTRAMUSCULAR; INTRAVENOUS 2 TIMES DAILY
Status: DISCONTINUED | OUTPATIENT
Start: 2020-01-26 | End: 2020-01-26

## 2020-01-26 RX ORDER — FUROSEMIDE 40 MG/1
40 TABLET ORAL ONCE
Status: DISCONTINUED | OUTPATIENT
Start: 2020-01-27 | End: 2020-01-27

## 2020-01-26 RX ORDER — ASPIRIN 81 MG/1
81 TABLET, CHEWABLE ORAL DAILY
Status: DISCONTINUED | OUTPATIENT
Start: 2020-01-26 | End: 2020-01-26 | Stop reason: SDUPTHER

## 2020-01-26 RX ORDER — ASPIRIN 300 MG/1
300 SUPPOSITORY RECTAL DAILY
Status: DISCONTINUED | OUTPATIENT
Start: 2020-01-26 | End: 2020-01-27 | Stop reason: HOSPADM

## 2020-01-26 RX ORDER — LISINOPRIL 5 MG/1
5 TABLET ORAL DAILY
Status: DISCONTINUED | OUTPATIENT
Start: 2020-01-26 | End: 2020-01-27 | Stop reason: HOSPADM

## 2020-01-26 RX ORDER — ONDANSETRON 2 MG/ML
4 INJECTION INTRAMUSCULAR; INTRAVENOUS EVERY 6 HOURS PRN
Status: DISCONTINUED | OUTPATIENT
Start: 2020-01-26 | End: 2020-01-27 | Stop reason: HOSPADM

## 2020-01-26 RX ORDER — QUETIAPINE FUMARATE 25 MG/1
25 TABLET, FILM COATED ORAL ONCE
Status: DISCONTINUED | OUTPATIENT
Start: 2020-01-26 | End: 2020-01-27 | Stop reason: HOSPADM

## 2020-01-26 RX ORDER — ATORVASTATIN CALCIUM 80 MG/1
80 TABLET, FILM COATED ORAL NIGHTLY
Status: DISCONTINUED | OUTPATIENT
Start: 2020-01-26 | End: 2020-01-26 | Stop reason: SDUPTHER

## 2020-01-26 RX ORDER — NICOTINE 21 MG/24HR
1 PATCH, TRANSDERMAL 24 HOURS TRANSDERMAL DAILY
Status: DISCONTINUED | OUTPATIENT
Start: 2020-01-26 | End: 2020-01-27 | Stop reason: HOSPADM

## 2020-01-26 RX ORDER — SODIUM CHLORIDE 0.9 % (FLUSH) 0.9 %
10 SYRINGE (ML) INJECTION PRN
Status: DISCONTINUED | OUTPATIENT
Start: 2020-01-26 | End: 2020-01-27 | Stop reason: HOSPADM

## 2020-01-26 RX ORDER — IPRATROPIUM BROMIDE AND ALBUTEROL SULFATE 2.5; .5 MG/3ML; MG/3ML
1 SOLUTION RESPIRATORY (INHALATION)
Status: DISCONTINUED | OUTPATIENT
Start: 2020-01-26 | End: 2020-01-27

## 2020-01-26 RX ORDER — ASPIRIN 81 MG/1
81 TABLET ORAL DAILY
Status: DISCONTINUED | OUTPATIENT
Start: 2020-01-26 | End: 2020-01-27 | Stop reason: HOSPADM

## 2020-01-26 RX ORDER — SODIUM CHLORIDE 9 MG/ML
INJECTION, SOLUTION INTRAVENOUS CONTINUOUS
Status: DISCONTINUED | OUTPATIENT
Start: 2020-01-26 | End: 2020-01-26

## 2020-01-26 RX ORDER — ATORVASTATIN CALCIUM 80 MG/1
80 TABLET, FILM COATED ORAL NIGHTLY
Status: DISCONTINUED | OUTPATIENT
Start: 2020-01-26 | End: 2020-01-27 | Stop reason: HOSPADM

## 2020-01-26 RX ORDER — ACETAMINOPHEN 325 MG/1
650 TABLET ORAL EVERY 4 HOURS PRN
Status: DISCONTINUED | OUTPATIENT
Start: 2020-01-26 | End: 2020-01-27 | Stop reason: HOSPADM

## 2020-01-26 RX ORDER — CHOLECALCIFEROL (VITAMIN D3) 125 MCG
1000 CAPSULE ORAL DAILY
Status: DISCONTINUED | OUTPATIENT
Start: 2020-01-26 | End: 2020-01-27 | Stop reason: HOSPADM

## 2020-01-26 RX ADMIN — IPRATROPIUM BROMIDE AND ALBUTEROL SULFATE 1 AMPULE: .5; 3 SOLUTION RESPIRATORY (INHALATION) at 15:03

## 2020-01-26 RX ADMIN — SODIUM CHLORIDE, PRESERVATIVE FREE 10 ML: 5 INJECTION INTRAVENOUS at 21:15

## 2020-01-26 RX ADMIN — IPRATROPIUM BROMIDE AND ALBUTEROL SULFATE 1 AMPULE: .5; 3 SOLUTION RESPIRATORY (INHALATION) at 07:37

## 2020-01-26 RX ADMIN — FAMOTIDINE 20 MG: 10 INJECTION, SOLUTION INTRAVENOUS at 02:46

## 2020-01-26 RX ADMIN — ENOXAPARIN SODIUM 40 MG: 40 INJECTION SUBCUTANEOUS at 09:30

## 2020-01-26 RX ADMIN — LISINOPRIL 5 MG: 5 TABLET ORAL at 09:30

## 2020-01-26 RX ADMIN — HALOPERIDOL LACTATE 5 MG: 5 INJECTION, SOLUTION INTRAMUSCULAR at 00:25

## 2020-01-26 RX ADMIN — FUROSEMIDE 40 MG: 10 INJECTION, SOLUTION INTRAMUSCULAR; INTRAVENOUS at 06:43

## 2020-01-26 RX ADMIN — SODIUM CHLORIDE, PRESERVATIVE FREE 10 ML: 5 INJECTION INTRAVENOUS at 09:35

## 2020-01-26 RX ADMIN — IOHEXOL 75 ML: 350 INJECTION, SOLUTION INTRAVENOUS at 00:57

## 2020-01-26 RX ADMIN — METOPROLOL TARTRATE 12.5 MG: 25 TABLET ORAL at 21:09

## 2020-01-26 RX ADMIN — FAMOTIDINE 20 MG: 10 INJECTION, SOLUTION INTRAVENOUS at 09:30

## 2020-01-26 RX ADMIN — FAMOTIDINE 20 MG: 10 INJECTION, SOLUTION INTRAVENOUS at 21:09

## 2020-01-26 RX ADMIN — SODIUM CHLORIDE: 9 INJECTION, SOLUTION INTRAVENOUS at 01:40

## 2020-01-26 RX ADMIN — METOPROLOL TARTRATE 12.5 MG: 25 TABLET ORAL at 09:30

## 2020-01-26 RX ADMIN — IPRATROPIUM BROMIDE AND ALBUTEROL SULFATE 1 AMPULE: .5; 3 SOLUTION RESPIRATORY (INHALATION) at 20:44

## 2020-01-26 RX ADMIN — Medication 81 MG: at 09:30

## 2020-01-26 RX ADMIN — Medication 1000 MCG: at 16:48

## 2020-01-26 RX ADMIN — ATORVASTATIN CALCIUM 80 MG: 80 TABLET, FILM COATED ORAL at 21:09

## 2020-01-26 ASSESSMENT — PAIN SCALES - GENERAL
PAINLEVEL_OUTOF10: 0

## 2020-01-26 ASSESSMENT — PAIN - FUNCTIONAL ASSESSMENT: PAIN_FUNCTIONAL_ASSESSMENT: 0-10

## 2020-01-26 NOTE — PROGRESS NOTES
ICU PATIENT TRANSFER NOTE        Patient:  Marshal Other  YOB: 1955    MRN: [id number]     Acct: [de-identified]     Admit date: 1/25/2020    Code Status:-  Full    Reason for ICU Admission:-   Altered mental status    SUPPORT DEVICES: [] Ventilator [] BIPAP  [x] Nasal Cannula [] Room Air    Consultations:- [] Cardiology [] Nephrology  [] Hemo onco  [] GI                               [] ID [] ENT  [] Rheum [] Endo   []Physiotherapy                                 Others:- Neurology    NUTRITION:  [] NPO [] Tube Feeding  [] TPN  [x] PO    Central Lines:- [x] No   [] Yes           I        Pt seen and Chart reviewed.   He is alert and oriented X3  Says breathing is much better, on nasal cannula  Does not remember what happened on the day of admission   No other complaints overnight    ICU COURSE :-      This is a 57-year-old male with past medical history of prior TIAs, CVA, evidence of prior occipital infarcts, prior coronary disease hypertension hyperlipidemia with carotid stenosis status post stent placement currently taking 81 mg aspirin daily as well as lisinopril and metoprolol presented to the ER with change in mental status.  Patient was GCS 12-13 not responding to questions or opening eyes found to be hypertensive systolic in 947Y initially,  stroke alert was called, also had nicardipine drip started for blood pressure management for possible hypertensive emergency, ER notes patient's mentation improved once blood pressure was reduced to 180s to 160s, patient had CT head, CTA head neck performed and assessment by the stroke team which showed no acute neurologic deficits no significant stenosis or acute large vessel occlusion, stroke team is ordered an MRI and echocardiogram and carotid ultrasound further typical stroke work-up, there was found to be pre-existing unchanged evidence of infarcts in the occipital lobes similar to previous studies     Patient also was initially brought on nasal cannula but desaturated to mid to high 80s requiring a nonrebreather, concern was for possible intubation patient was weaned back to 4 L nasal cannula but is intermittently remained 88% to 92%.  Patient appears somewhat drowsy hypoventilating this may be contributing to the desaturations, due to respiratory status and decreased mentation decision was made for ICU admission.  Patient did receive Narcan by EMS and in the ER with no change        Physical Exam:  Vitals: /62   Pulse 66   Temp 98.6 °F (37 °C) (Oral)   Resp 25   Ht 5' 9\" (1.753 m)   Wt 190 lb 3.2 oz (86.3 kg)   SpO2 94%   BMI 28.09 kg/m²   24 hour intake/output:    Intake/Output Summary (Last 24 hours) at 1/26/2020 1311  Last data filed at 1/26/2020 1028  Gross per 24 hour   Intake 669 ml   Output 1675 ml   Net -1006 ml     Last 3 weights: Wt Readings from Last 3 Encounters:   01/26/20 190 lb 3.2 oz (86.3 kg)   01/06/20 199 lb (90.3 kg)   12/18/19 185 lb (83.9 kg)       General appearance: alert and cooperative with exam  HEENT: Head: Normocephalic, no lesions, without obvious abnormality.   Neck: no adenopathy, no carotid bruit, no JVD, supple, symmetrical, trachea midline and thyroid not enlarged, symmetric, no tenderness/mass/nodules  Lungs: clear to auscultation bilaterally  Heart: regular rate and rhythm, S1, S2 normal, no murmur, click, rub or gallop  Abdomen: soft, non-tender; bowel sounds normal; no masses,  no organomegaly  Extremities: extremities normal, atraumatic, no cyanosis or edema  Neurologic: Mental status: Alert, oriented, thought content appropriate    Medications:Current Inpatient  Scheduled Meds:   sodium chloride flush  10 mL Intravenous 2 times per day    aspirin  81 mg Oral Daily    Or    aspirin  300 mg Rectal Daily    metoprolol tartrate  12.5 mg Oral BID    lisinopril  5 mg Oral Daily    furosemide  40 mg Intravenous BID    QUEtiapine  25 mg Oral Once  sodium chloride flush  10 mL Intravenous 2 times per day    enoxaparin  40 mg Subcutaneous Daily    famotidine (PEPCID) injection  20 mg Intravenous BID    ipratropium-albuterol  1 ampule Inhalation Q4H WA    atorvastatin  80 mg Oral Nightly     Continuous Infusions:  PRN Meds:sodium chloride flush, magnesium hydroxide, sodium chloride flush, ondansetron, acetaminophen    Objective:    CBC:   Recent Labs     01/25/20  1742 01/26/20 0443   WBC 8.7 10.3   HGB 15.0 14.5    179     BMP:    Recent Labs     01/25/20  1725 01/25/20  1902 01/26/20 0443   NA  --  139 139   K  --  3.9 4.0   CL  --  103 105   CO2  --  24 25   BUN  --  12 13   CREATININE 1.18 0.90 1.00   GLUCOSE  --  141* 115*     Calcium:  Recent Labs     01/26/20 0443   CALCIUM 8.5*     Ionized Calcium:No results for input(s): IONCA in the last 72 hours. Magnesium:  Recent Labs     01/26/20 0443   MG 2.0     Phosphorus:  Recent Labs     01/26/20 0443   PHOS 4.1     BNP:No results for input(s): BNP in the last 72 hours. Glucose:  Recent Labs     01/25/20 1725   POCGLU 101*     HgbA1C: No results for input(s): LABA1C in the last 72 hours. INR:   Recent Labs     01/25/20 1742   INR 1.0     Hepatic:   Recent Labs     01/25/20 1902   ALKPHOS 95   ALT 18   AST 18   PROT 8.1   BILITOT 0.71   BILIDIR 0.18   LABALBU 4.1     Amylase and Lipase:  Recent Labs     01/26/20 0443   LACTA NOT REPORTED     Lactic Acid:   Recent Labs     01/26/20 0443   LACTA NOT REPORTED     CARDIAC ENZYMES:No results for input(s): CKTOTAL, CKMB, CKMBINDEX, TROPONINI in the last 72 hours. BNP: No results for input(s): BNP in the last 72 hours.   Lipids:   Recent Labs     01/26/20 0443   CHOL 178   TRIG 82   HDL 30*     ABGs: No results found for: PH, PCO2, PO2, HCO3, O2SAT  Thyroid:   Lab Results   Component Value Date    TSH 1.27 01/25/2020      Urinalysis:   Recent Labs     01/25/20  1840   BACTERIA NOT REPORTED   COLORU YELLOW   PHUR 7.0   PROTEINU 2+*   RBCUA 5 TO 10   SPECGRAV 1.017   BILIRUBINUR NEGATIVE   NITRU NEGATIVE   WBCUA 0 TO 2   LEUKOCYTESUR NEGATIVE   GLUCOSEU NEGATIVE       CULTURES: None      EKG: normal EKG, normal sinus rhythm, unchanged from previous tracings. ECHO: ordered, but not yet obtained. Stress Test: not obtained. Cardiac Angiography: not obtained.           Assessment:  Patient Active Problem List   Diagnosis    Hyperlipidemia    GERD (gastroesophageal reflux disease)    Dizzy    Epigastric pain    Upper GI bleed    Blood loss anemia    GI bleed    Sigmoid polyp    Gastritis    Acute blood loss anemia    Gastric ulcer    Gastrointestinal hemorrhage    LEVI (acute kidney injury) ([address]    Mild malnutrition (HCC)    Tobacco use    Colon polyp    Hypertension    Hyperglycemia    Hypokalemia    Severe anemia    Acute lower GI bleeding    Coronary artery disease involving native coronary artery of native heart without angina pectoris    Benign essential HTN    Acute gastric ulcer with hemorrhage    Gastrointestinal hemorrhage with hematemesis    AVM (arteriovenous malformation) of duodenum, acquired    History of colon polyps    Adenomatous colon polyp    Hematemesis    Chronic fatigue    PUD (peptic ulcer disease)    Anemia    Diverticulosis of large intestine without hemorrhage    Arthritis of knee    Cerebellar cerebrovascular accident (CVA) without late effect    Syncope and collapse    Multi-infarct state    Left homonymous hemianopsia    Occipital infarction (HCC)    Visual impairment severe bilaterally    Hypotension due to blood loss    Acute ischemic stroke (HCC)    Hypotension    Symptomatic anemia    History of GI bleed    Iron deficiency anemia    Prolonged Q-T interval on ECG    Memory loss of unknown cause    Visual disturbance    Vision loss, bilateral    Vertebrobasilar insufficiency    History of CVA (cerebrovascular accident)    Cerebellar infarct ([address]    Left temporal lobe infarction ([facility]    Stroke-like symptom    Cataracts, bilateral    Transient ischemic attack (TIA)    Internal carotid artery stenosis, right: 50 to 69%    Altered mental status, unspecified         Plan:  1. Tests / Labs to be followed up:-   [x] No   [] Yes       2. Hypertensive encephalopathy secondary to hypertensive [id number] off Cardene drip, started on Lisinopril 5 mg Po OD, Lopressor 12.5 mg PO BID, watch for changes in mentation, stroke work up was negative for acute changes, MRI showed previous strokes. Ammonia elevated to 62, may have contributed to the AMS. 3. Acute respiratory failure secondary to KLG-[id number]-[id number] wean off oxygen, improved with breathing treatments, continue breathing treatment  4. Diet- cardiac  5. Dyslipidemia-stable-Lipitor 80 mg PO OD  6. Flash pulmonary edema secondary to hypertension-Will give one more dose of Lasix 40 mg PO OD, Will get repeat xray in the morning  7.  Discharge planning- case management consulted       Dakota Sneed MD             1/26/2020, 1:11 PM

## 2020-01-26 NOTE — ED NOTES
Pt resting on stretcher with eyes closed, chest rise and fall noted. Pt in NAD at this time. Pt remains on telemetry monitoring with alarms set.   Will continue to monitor      Juani Spencer RN  01/25/20 2284

## 2020-01-26 NOTE — PROGRESS NOTES
Critical care team - Resident sign-out to medicine service      Date and time: 1/26/2020 12:05 PM  Patient's name:  Sandip Christy Record Number: [id number]  Patient's account/billing number: [de-identified]  Patient's YOB: 1955  Age: 71 y.o. Date of Admission: 1/25/2020  5:14 PM  Length of stay during current admission: 1    Primary Care Physician: CESAR Martins CNP    Code Status: Full Code    Mode of physician to physician communication:        [] Via telephone   [] In person     Date and time of sign-out: 1/26/2020 12:05 PM    Accepting Internal Medicine resident: Dr. Sangita Fuentes     Accepting Medicine team: IM Team 1    Accepting team's attending: Dr. Sis Cope    Patient's current ICU Bed:  105     Patient's assigned bed on floor:  315        [x] Med-Surg Monitored [] Step-down       [] Psychiatry ICU       [] Psych floor     Reason for ICU admission:     Altered mental status and hypertension    ICU course summary: This is a 69-year-old male with past medical history of prior TIAs, CVA, evidence of prior occipital infarcts, prior coronary disease hypertension hyperlipidemia with carotid stenosis status post stent placement currently taking 81 mg aspirin daily as well as lisinopril and metoprolol presented to the ER with change in mental status.   Patient was GCS 12-13 not responding to questions or opening eyes found to be hypertensive systolic in 440L initially, patient was called a stroke alert also had nicardipine drip started for blood pressure management for possible hypertensive emergency, ER notes patient's mentation improved once blood pressure was reduced to 180s to 160s, patient had CT head, CTA head neck performed and assessment by the stroke team which showed no acute neurologic deficits no significant stenosis or acute large vessel occlusion, stroke team is ordered an MRI and echocardiogram and carotid ultrasound further typical stroke work-up, there was found to be pre-existing unchanged evidence of infarcts in the occipital lobes similar to previous studies     Patient also was initially brought on nasal cannula but desaturated to mid to high 80s requiring a nonrebreather, concern was for possible intubation patient was weaned back to 4 L nasal cannula but is intermittently remained 88% to 92%. Patient appears somewhat drowsy hypoventilating this may be contributing to the desaturations, due to respiratory status and decreased mentation decision was made for ICU admission. Patient did receive Narcan by EMS and in the ER with no change       Procedures during patient's ICU stay:         Current Vitals:     /61   Pulse 68   Temp 98.6 °F (37 °C) (Oral)   Resp 19   Ht 5' 9\" (1.753 m)   Wt 190 lb 3.2 oz (86.3 kg)   SpO2 95%   BMI 28.09 kg/m²       Cultures:     Blood cultures:                 [] None drawn      [] Negative             []  Positive (Details:  )  Urine Culture:                   [] None drawn      [] Negative             []  Positive (Details:  )  Sputum Culture:               [] None drawn       [] Negative             []  Positive (Details:  )   Endotracheal aspirate:     [] None drawn       [] Negative             []  Positive (Details:  )       Consults:     1.  Neuro    Assessment:     Patient Active Problem List    Diagnosis Date Noted    Altered mental status, unspecified 01/25/2020    Cataracts, bilateral 12/19/2019    Transient ischemic attack (TIA) 12/19/2019    Internal carotid artery stenosis, right: 50 to 69% 12/19/2019    Vision loss, bilateral 12/18/2019    Cerebellar infarct ([address] 12/18/2019    Left temporal lobe infarction ([address] 12/18/2019    Stroke-like symptom 12/18/2019    Vertebrobasilar insufficiency     History of CVA (cerebrovascular accident)     Visual disturbance 01/24/2019    Memory loss of unknown cause 12/03/2018    Prolonged Q-T interval on ECG 01/14/2017    Iron deficiency anemia     Symptomatic anemia  History of GI bleed     Hypotension     Visual impairment severe bilaterally 12/10/2016    Hypotension due to blood loss     Acute ischemic stroke ([address]     Syncope and collapse 12/08/2016    Multi-infarct state 12/08/2016    Left homonymous hemianopsia 12/08/2016    Occipital infarction ([address]     Cerebellar cerebrovascular accident (CVA) without late effect 10/10/2016    Arthritis of knee 05/19/2016    Diverticulosis of large intestine without hemorrhage 04/25/2016    Anemia 04/02/2016    Hematemesis     Chronic fatigue     PUD (peptic ulcer disease)     History of colon polyps     Adenomatous colon polyp     AVM (arteriovenous malformation) of duodenum, acquired 03/09/2016    Gastrointestinal hemorrhage with hematemesis 03/01/2016    Acute gastric ulcer with hemorrhage 02/24/2016    Coronary artery disease involving native coronary artery of native heart without angina pectoris     Benign essential HTN     Severe anemia 01/13/2016    Acute lower GI bleeding 01/13/2016    Hypokalemia 12/19/2015    Hyperglycemia 12/16/2015    Hypertension 11/18/2015    Colon polyp     LEVI (acute kidney injury) ([address] 11/14/2015    Mild malnutrition ([address] 11/14/2015    Tobacco use 11/14/2015    Gastrointestinal hemorrhage     Acute blood loss anemia 11/13/2015    Gastric ulcer 11/13/2015    Sigmoid polyp 07/25/2015    Gastritis 07/25/2015    GI bleed     Upper GI bleed 07/22/2015    Blood loss anemia 07/22/2015    Epigastric pain 04/08/2014    Hyperlipidemia 07/30/2013    GERD (gastroesophageal reflux disease) 07/30/2013    Dizzy 07/30/2013       Additional assessment:    1.      Recommended Follow-up:        Nuero   negative CT head, no significant stenosis on CTA head and neck, pending MRI, stroke team following  - hypertensive encephalopathy mentation seemed to improve with blood pressure being reduced with nicardipine will continue to monitor  -Neurology consulted, at this time would not think this is infectious process no need for LP for encephalitis at this time  - Haldol for agitation, will give 1 dose of Seroquel and continue to monitor  -ammonia is elevated this could be contributed patient's encephalopathy will investigate further pending labs and LFTs     Cardiac  -Patient maintain adequate map, will continue to monitor, bedside ultrasound shows adequate heart squeeze without effusion, last echocardiogram showed normal ejection fraction with some left ventricular hypertrophy  -Repeat formal echocardiogram  -Keep map above 65  -Patient does have evidence of vascular congestion and mild pulmonary edema this is also evident on ultrasound will give dose of Lasix in the ER and 40 of Lasix twice daily for 4 doses will attempt BiPAP overnight     Respiratory  -Patient had a desaturation while in the ER to the mid to high 80s requiring nasal cannula and subsequently nonrebreather.   Combined with patient's mental status he has been hypoventilating however is maintaining secretions and maintain his airway  -We will continue to closely monitor for possible need for intubation  -Patient has mild hypercapnia his CO2 was in the 50s on VBG, his end-tidal in the ER is 50, will be repeat blood gas placed on BiPAP patient likely has COPD undiagnosed  -Treating for CHF with Lasix and BiPAP as well this may be contributing to patient's desaturation patient also hypoventilating this may be improved with BiPAP  -Patient is getting a CT pulmonary embolism study prior to coming up to the floor that is pending  -VBG in the morning repeat chest x-ray in the morning     GI/nutrition  -Patient has no abdominal pain, I am awaiting an ED tox screen as patient takes Tylenol at home, awaiting LFTs as ammonia is elevated, further work-up pending those results  -We will keep n.p.o. at this time given patient's mentation status and likely need for BiPAP  -Placed on Pepcid for ulcer

## 2020-01-26 NOTE — CONSULTS
[facility] Neurology   [address]            Date:   1/25/2020  Patient name:  Finesse Hi  Date of admission:  1/25/2020  5:14 PM  MRN:   [id number]  Account:  [de-identified]  YOB: 1955  PCP:    CESAR Potts CNP  Room:   27/27  Code Status:    Prior    Chief Complaint:     Chief Complaint   Patient presents with    Loss of Vision     Pt reports gradual loss of vision x 1 month. Pt with hx of similiar symptoms with TIA in the past        History Obtained From:     domestic partner, electronic medical record    History of Present Illness: The patient is a 71 y.o. Non-/non  male who presents with Loss of Vision (Pt reports gradual loss of vision x 1 month. Pt with hx of similiar symptoms with TIA in the past )   and he is admitted to the hospital for the management of  Altered mentation. He presented with gradually progressing confusion and intermittent vision loss. Started about 2 pm today per girlfriend. No loss of consciousness, headache, seizure-like activity or incontinence noted. He was brought in to the ED, where he was able to answer all questions and move all his extremities to command. However, he became progressively worse, now unable to follow commands, speak or move.      On arrival, his BP was 201/107. O2 saturation 98%, which have since dropped into the mid-high 80s. He was started on Cardene and non-re breather mask respectively.      H/o bilateral cerebellar, occipital infarcts with left homonomous hemianopsia. Past EEG normal. PUD, AVM of duodenum and acute lower GI bleeding (2016).     Past Medical History:     Past Medical History:   Diagnosis Date    Adenomatous colon polyp 2016    Arthritis     CVA (cerebral vascular accident) ([facility] [address] 03/2016    Diverticulosis of colon     History of colon polyps 2016    Hyperlipidemia     Hypertension     Internal carotid artery stenosis, right: 50 to Equal air entry bilaterally   CARDIOVASCULAR:  normal s1 / s2   ABDOMEN:  Soft, no rigidity   NEUROLOGIC:  Mental Status:  Not oriented to date, Not oriented to person and Not oriented to place,somnolent             Cranial Nerves:    cranial nerves II-XII are grossly intact     Motor Exam:    Drift:  absent  Tone:  normal     Does not follow commands, however moves all limbs     Sensory:    Touch:    Right Upper Extremity:  normal  Left Upper Extremity:  normal  Right Lower Extremity:  normal  Left Lower Extremity:  normal     Deep Tendon Reflexes:    Right Bicep:  2+  Left Bicep:  2+  Right Knee:  2+  Left Knee:  2+     Plantar Response:  Right:  downgoing  Left:  downgoing     Clonus:  absent  Hairston's:  N/A     Coordination/Dysmetria:  Heel to Shin:  Could not assess  Finger to Nose:   Could not assess  Dysdiadochokinesia:  Could not assess     Gait:  Could not assess     INITIAL NIH STROKE SCALE:     Time Performed:  6:45 PM      1a. Level of consciousness:  2 - not alert, requires repeated stimulation to attend, or is obtunded and requires strong or painful stimulation to make movements (not stereotyped)   1b. Level of consciousness questions:  2 - answers neither question correctly  1c. Level of consciousness questions:  2 - performs neither task correctly  2. Best Gaze: Not following commands; no nystagmus  3. Visual: Unknown  4. Facial Palsy: not following commands. Unable to assess  5a. Motor left arm:  3 - no effort against gravity, limb falls  5b. Motor right arm:  3 - no effort against gravity, limb falls  6a. Motor left leg:  3 - no effort against gravity; leg falls to bed immediately  6b. Motor right leg:  3 - no effort against gravity; leg falls to bed immediately  7. Limb Ataxia:  0 - absent  8. Sensory:  0 - normal; no sensory loss  9. Best Language: Unable to assess; altered  10. Dysarthria: unable to assess; altered  11.   Extinction and Inattention: Unable to assess; altered     TOTAL:  Unable to give accurate assessment      SKIN:  no rash         Investigations:      Laboratory Testing:  Recent Results (from the past 24 hour(s))   Venous Blood Gas, POC    Collection Time: 01/25/20  5:25 PM   Result Value Ref Range    pH, Tino 7.322 7.320 - 7.430    pCO2, Tino 58.8 (H) 41.0 - 51.0 mm Hg    pO2, Tino 47.2 30.0 - 50.0 mm Hg    HCO3, Venous 30.4 (H) 22.0 - 29.0 mmol/L    Total CO2, Venous 32 (H) 23.0 - 30.0 mmol/L    Negative Base Excess, Tino NOT REPORTED 0.0 - 2.0    Positive Base Excess, Tino 2 0.0 - 3.0    O2 Sat, Tino 78 60.0 - 85.0 %    O2 Device/Flow/% NOT REPORTED     Bruce Test NOT REPORTED     Sample Site NOT REPORTED     Mode NOT REPORTED     FIO2 NOT REPORTED     Pt Temp NOT REPORTED     POC pH Temp NOT REPORTED     POC pCO2 Temp NOT REPORTED mm Hg    POC pO2 Temp NOT REPORTED mm Hg   Hemoglobin and hematocrit, blood    Collection Time: 01/25/20  5:25 PM   Result Value Ref Range    POC Hemoglobin 15.9 13.5 - 17.5 g/dL    POC Hematocrit 47 41 - 53 %   Creatinine W/GFR Point of Care    Collection Time: 01/25/20  5:25 PM   Result Value Ref Range    POC Creatinine 1.18 0.51 - 1.19 mg/dL    GFR Comment >60 >60 mL/min    GFR Non-African American >60 >60 mL/min    GFR Comment         SODIUM (POC)    Collection Time: 01/25/20  5:25 PM   Result Value Ref Range    POC Sodium 143 138 - 146 mmol/L   POTASSIUM (POC)    Collection Time: 01/25/20  5:25 PM   Result Value Ref Range    POC Potassium 5.4 (H) 3.5 - 4.5 mmol/L   CHLORIDE (POC)    Collection Time: 01/25/20  5:25 PM   Result Value Ref Range    POC Chloride 104 98 - 107 mmol/L   CALCIUM, IONIC (POC)    Collection Time: 01/25/20  5:25 PM   Result Value Ref Range    POC Ionized Calcium 1.13 (L) 1.15 - 1.33 mmol/L   Lactic Acid, POC    Collection Time: 01/25/20  5:25 PM   Result Value Ref Range    POC Lactic Acid 0.85 0.56 - 1.39 mmol/L   POCT Glucose    Collection Time: 01/25/20  5:25 PM   Result Value Ref Range    POC Glucose 101 (H) 74 - 100 mg/dL   Anion Gap (Calc) POC    Collection Time: 01/25/20  5:25 PM   Result Value Ref Range    Anion Gap 9 7 - 16 mmol/L   CBC Auto Differential    Collection Time: 01/25/20  5:42 PM   Result Value Ref Range    WBC 8.7 3.5 - 11.3 k/uL    RBC 4.93 4.21 - 5.77 m/uL    Hemoglobin 15.0 13.0 - 17.0 g/dL    Hematocrit 45.5 40.7 - 50.3 %    MCV 92.3 82.6 - 102.9 fL    MCH 30.4 25.2 - 33.5 pg    MCHC 33.0 28.4 - 34.8 g/dL    RDW 16.8 (H) 11.8 - 14.4 %    Platelets 763 707 - 199 k/uL    MPV 10.2 8.1 - 13.5 fL    NRBC Automated 0.0 0.0 per 100 WBC    Differential Type NOT REPORTED     Seg Neutrophils 73 (H) 36 - 65 %    Lymphocytes 16 (L) 24 - 43 %    Monocytes 10 3 - 12 %    Eosinophils % 1 1 - 4 %    Basophils 0 0 - 2 %    Immature Granulocytes 0 0 %    Segs Absolute 6.33 1.50 - 8.10 k/uL    Absolute Lymph # 1.42 1.10 - 3.70 k/uL    Absolute Mono # 0.88 0.10 - 1.20 k/uL    Absolute Eos # 0.04 0.00 - 0.44 k/uL    Basophils Absolute <0.03 0.00 - 0.20 k/uL    Absolute Immature Granulocyte <0.03 0.00 - 0.30 k/uL    WBC Morphology NOT REPORTED     RBC Morphology ANISOCYTOSIS PRESENT     Platelet Estimate NOT REPORTED    SPECIMEN REJECTION    Collection Time: 01/25/20  5:42 PM   Result Value Ref Range    Specimen Source . BLOOD     Ordered Test BMP TSH     Reason for Rejection Unable to perform testing: Specimen hemolyzed.      - NOT REPORTED    URINALYSIS    Collection Time: 01/25/20  6:40 PM   Result Value Ref Range    Color, UA YELLOW YELLOW    Turbidity UA CLEAR CLEAR    Glucose, Ur NEGATIVE NEGATIVE    Bilirubin Urine NEGATIVE NEGATIVE    Ketones, Urine NEGATIVE NEGATIVE    Specific Gravity, UA 1.017 1.005 - 1.030    Urine Hgb MODERATE (A) NEGATIVE    pH, UA 7.0 5.0 - 8.0    Protein, UA 2+ (A) NEGATIVE    Urobilinogen, Urine Normal Normal    Nitrite, Urine NEGATIVE NEGATIVE    Leukocyte Esterase, Urine NEGATIVE NEGATIVE    Urinalysis Comments NOT REPORTED    Microscopic Urinalysis    Collection Time: 01/25/20 6:40 PM   Result Value Ref Range    -          WBC, UA 0 TO 2 0 - 5 /HPF    RBC, UA 5 TO 10 0 - 4 /HPF    Casts UA  0 - 8 /LPF     2 TO 5 HYALINE Reference range defined for non-centrifuged specimen. Crystals UA NOT REPORTED None /HPF    Epithelial Cells UA 2 TO 5 0 - 5 /HPF    Renal Epithelial, Urine NOT REPORTED 0 /HPF    Bacteria, UA NOT REPORTED None    Mucus, UA NOT REPORTED None    Trichomonas, UA NOT REPORTED None    Amorphous, UA NOT REPORTED None    Other Observations UA NOT REPORTED NOT REQ. Yeast, UA NOT REPORTED None   Basic Metabolic Panel    Collection Time: 01/25/20  7:02 PM   Result Value Ref Range    Glucose 141 (H) 70 - 99 mg/dL    BUN 12 8 - 23 mg/dL    CREATININE 0.90 0.70 - 1.20 mg/dL    Bun/Cre Ratio NOT REPORTED 9 - 20    Calcium 8.7 8.6 - 10.4 mg/dL    Sodium 139 135 - 144 mmol/L    Potassium 3.9 3.7 - 5.3 mmol/L    Chloride 103 98 - 107 mmol/L    CO2 24 20 - 31 mmol/L    Anion Gap 12 9 - 17 mmol/L    GFR Non-African American >60 >60 mL/min    GFR African American >60 >60 mL/min    GFR Comment          GFR Staging NOT REPORTED    TSH without Reflex    Collection Time: 01/25/20  7:02 PM   Result Value Ref Range    TSH 1.27 0.30 - 5.00 mIU/L   AMMONIA    Collection Time: 01/25/20  7:02 PM   Result Value Ref Range    Ammonia 82 (H) 16 - 60 umol/L   Brain Natriuretic Peptide    Collection Time: 01/25/20  7:02 PM   Result Value Ref Range    Pro- (H) <300 pg/mL    BNP Interpretation Pro-BNP Reference Range:    Troponin    Collection Time: 01/25/20  7:02 PM   Result Value Ref Range    Troponin, High Sensitivity <6 0 - 22 ng/L    Troponin T NOT REPORTED <0.03 ng/mL    Troponin Interp NOT REPORTED        Imaging/Diagnostics:    CTA Head/Neck, CT head 1/25/2020  Impression:     1.  No intracranial proximal large artery focal high-grade stenosis or  occlusion.     2.  No hemodynamically significant stenosis in the bilateral cervical  internal carotid arteries per NASCET criteria.  Right

## 2020-01-26 NOTE — ED NOTES
Pt moving all extremities and rolling on stretcher. Pt repeatedly yelling \" got out of my way damn it.   I have to pee\"  Pt assisted to [facility], RN  01/25/20 2014

## 2020-01-26 NOTE — ED NOTES
Unable to complete CT scan d/t pt trying to stand up and pt urinating all over stretcher. Pt returned to ED room 26 and placed back on telemetry monitoring.          Gwyn Mcardle, RN  01/25/20 [id number]

## 2020-01-26 NOTE — PLAN OF CARE
Ida Cueva, [facility] Assessment complete. Altered mental status, unspecified [R41.82] . Vitals:    01/26/20 0130   BP: (!) 120/53   Pulse: 71   Resp: 20   Temp:    SpO2:    . Patients home meds are   Prior to Admission medications    Medication Sig Start Date End Date Taking? Authorizing Provider   Acetaminophen (TYLENOL EXTRA STRENGTH PO) Take by mouth   Yes Historical Provider, MD   atorvastatin (LIPITOR) 80 MG tablet take 1 tablet by mouth once daily 1/6/20  Yes CESAR Marvin CNP   metoprolol tartrate (LOPRESSOR) 25 MG tablet Take 0.5 tablets by mouth 2 times daily 1/6/20  Yes CESAR Marvin CNP   lisinopril (PRINIVIL;ZESTRIL) 5 MG tablet Take 1 tablet by mouth daily 1/6/20  Yes CESAR Marvin CNP   aspirin 81 MG chewable tablet Take 1 tablet by mouth daily 1/6/20  Yes CESAR Marvin CNP   Cyanocobalamin ER (RA VITAMIN B-12 TR) 1000 MCG TBCR take 1 tablet by mouth once daily as directed 1/6/20  Yes CESAR Marvin CNP   nicotine (NICODERM CQ) 21 MG/24HR Place 1 patch onto the skin daily as needed (if patient smokes) 12/19/19  Yes Kathi Mchugh,    omeprazole (PRILOSEC) 20 MG delayed release capsule Take 20 mg by mouth daily   Yes Historical Provider, MD   magnesium oxide (MAG-OX) 400 (241.3 Mg) MG TABS tablet take 1 tablet by mouth once daily 12/3/18  Yes CESAR Marvin CNP   . Recent Surgical History: None = 0     Assessment: Patient presented with altered mental status. Patient states that he is not short of breath. Breath sounds are diminished throughout. Patient states that he has no history of COPD or asthma, however, he is an every day smoker. Patient states that he does not take breathing treatments at home. Patient refused to wear the BIPAP for the night. He is currently resting comfortably on a 4L nasal cannula. Will start patient on breathing treatments and continue to monitor.          RR 20  Breath Sounds: Diminished      · Bronchodilator assessment at level  3  · Hyperinflation assessment at level   · Secretion Management assessment at level    ·   · []    Bronchodilator Assessment  BRONCHODILATOR ASSESSMENT SCORE  Score 0 1 2 3 4 5   Breath Sounds   []  Patient Baseline [x]  No Wheeze good aeration []  Faint, scattered wheezing, good aeration [x]  Expiratory Wheezing and or moderately diminished []  Insp/Exp wheeze and/or very diminished []  Insp/Exp and/ or marked distress   Respiratory Rate   []  Patient Baseline []  Less than 20 []  Less than 20 [x]  20-25 []  Greater than 25 []  Greater than 25   Peak flow % of Pred or PB [x]  NA   []  Greater than 90%  []  81-90% []  71-80% []  Less than or equal to 70%  or unable to perform []  Unable due to Respiratory Distress   Dyspnea re []  Patient Baseline []  No SOB [x]  No SOB []  SOB on exertion []  SOB min activity []  At rest/acute   e FEV% Predicted       [x]  NA []  Above 69%  []  Unable []  Above 60-69%  []  Unable []  Above 50-59%  []  Unable []  Above 35-49%  []  Unable []  Less than 35%  []  Unable

## 2020-01-26 NOTE — FLOWSHEET NOTE
Patient left room 105 & is being transported to MRI via stretcher on 2L/NC accompanied by transporter. After MRI patient is to then be transported to room 317.

## 2020-01-26 NOTE — PROGRESS NOTES
antihypertensives w/o need for bolus BP meds, eating breakfast mild headache, will tfx med surg for remainder of stroke workup    TODAY:     AWAKE & FOLLOWING COMMANDS: []   No  [x]   Yes                           SECRETIONS                 Amount:  []   Small []   Moderate []   Large []   None        Color: []   White []   Colored []   Bloody                         SEDATION:                 RAAS Score: [x]   +1 to -1 []   -2 []   -3 []   -4        Sedation Agent: []   Propofol gtt []   Versed gtt  []   Ativan gtt  []   No Sedation        Paralytic Agent: []   Precedex []   Norcuron []   Nimbex []  None                       VASOPRESSORS:  [x]   No  []   Yes            Vasopressor Agent []   Levophed []   Dopamine []   Vasopressin []   Dobutamine  []   Phenylephrine  []   Epinephrine     OBJECTIVE:   VITAL SIGNS:  Patient Vitals for the past 8 hrs:   BP Temp Temp src Pulse Resp SpO2   20 1424 139/76 98.9 °F (37.2 °C) Oral 67 16 96 %   20 1306 130/76 -- -- 74 16 97 %   20 1200 126/62 99.3 °F (37.4 °C) Oral 66 25 94 %   20 1100 116/61 -- -- 68 19 95 %   20 1000 120/61 -- -- 66 18 98 %   20 0900 (!) 123/53 -- -- 76 17 95 %   20 0800 (!) 113/56 98.6 °F (37 °C) Oral 70 14 97 %   20 0700 (!) 111/50 -- -- 75 17 98 %       Last Body weight:   Wt Readings from Last 3 Encounters:   20 190 lb 3.2 oz (86.3 kg)   20 199 lb (90.3 kg)   19 185 lb (83.9 kg)       Body Mass Index : Body mass index is 28.09 kg/m². Tmax over 24 hours: Temp (24hrs), Av.6 °F (37 °C), Min:97.5 °F (36.4 °C), Max:99.3 °F (37.4 °C)      Ins/Outs:    In: 669 [I.V.:669]  Out: 9237 [Urine:1675]    PHYSICAL EXAM:  Constitutional: Awake and alert, well developed and well nourished, in no acute distress  EENT: PERRLA, EOMI, sclera clear, anicteric, oropharynx clear, no lesions,  Neck: Supple, symmetrical, trachea midline, no adenopathy, thyroid symmetric, no jvd  Respiratory: clear to auscultation, no wheezes, rales, or rhonchi  Cardiovascular: regular rate and rhythm, normal S1, S2, no murmur noted and 2+ pulses throughout  Abdomen: soft, nontender, nondistended, no masses or organomegaly  Extremities:  peripheral pulses normal, no pedal edema, no clubbing or cyanosis    MEDICATIONS:  Scheduled Meds:   sodium chloride flush  10 mL Intravenous 2 times per day    aspirin  81 mg Oral Daily    Or    aspirin  300 mg Rectal Daily    metoprolol tartrate  12.5 mg Oral BID    lisinopril  5 mg Oral Daily    QUEtiapine  25 mg Oral Once    sodium chloride flush  10 mL Intravenous 2 times per day    enoxaparin  40 mg Subcutaneous Daily    famotidine (PEPCID) injection  20 mg Intravenous BID    ipratropium-albuterol  1 ampule Inhalation Q4H WA    atorvastatin  80 mg Oral Nightly    [START ON 1/27/2020] furosemide  40 mg Oral Once       Continuous Infusions:      PRN Meds:   sodium chloride flush, 10 mL, PRN  magnesium hydroxide, 30 mL, Daily PRN  sodium chloride flush, 10 mL, PRN  ondansetron, 4 mg, Q6H PRN  acetaminophen, 650 mg, Q4H PRN        SUPPORT DEVICES: [] Ventilator [] BIPAP  [x] Nasal Cannula [] Room Air  VENT SETTINGS (Comprehensive) (if applicable):      Additional Respiratory  Assessments  Pulse: 67  Resp: 16  SpO2: 96 %  End Tidal CO2: 45 (%)  Lab Results   Component Value Date    MODE NOT REPORTED 01/26/2020       ABGs:       DATA:  Complete Blood Count:   Recent Labs     01/25/20  1742 01/26/20  0443   WBC 8.7 10.3   RBC 4.93 4.47   HGB 15.0 14.5   HCT 45.5 43.1   MCV 92.3 96.4   MCH 30.4 32.4   MCHC 33.0 33.6   RDW 16.8* 17.8*    179   MPV 10.2 10.0        Last 3 Blood Glucose:   Recent Labs     01/25/20  1902 01/26/20  0443   GLUCOSE 141* 115*        PT/INR:    Lab Results   Component Value Date    PROTIME 11.0 01/25/2020    PROTIME 10.6 09/20/2011    INR 1.0 01/25/2020     PTT:    Lab Results   Component Value Date    APTT 30.1 01/24/2019       Basic Metabolic Profile: Recent Labs     01/25/20  1725 01/25/20  1902 01/26/20  0443   NA  --  139 139   K  --  3.9 4.0   CL  --  103 105   CO2  --  24 25   BUN  --  12 13   CREATININE 1.18 0.90 1.00   GLUCOSE  --  141* 115*   PHOS  --   --  4.1       Liver Function:  Recent Labs     01/25/20  1902   PROT 8.1   LABALBU 4.1   ALT 18   AST 18   ALKPHOS 95   BILITOT 0.71       Magnesium:   Lab Results   Component Value Date    MG 2.0 01/26/2020    MG 2.0 01/23/2019    MG 2.1 12/03/2018     Phosphorus:   Lab Results   Component Value Date    PHOS 4.1 01/26/2020    PHOS 3.7 12/15/2016     Ionized Calcium:   Lab Results   Component Value Date    CAION 1.11 01/26/2020    CAION 1.15 12/15/2016        Urinalysis:   Lab Results   Component Value Date    NITRU NEGATIVE 01/25/2020    COLORU YELLOW 01/25/2020    PHUR 7.0 01/25/2020    WBCUA 0 TO 2 01/25/2020    RBCUA 5 TO 10 01/25/2020    MUCUS NOT REPORTED 01/25/2020    TRICHOMONAS NOT REPORTED 01/25/2020    YEAST NOT REPORTED 01/25/2020    BACTERIA NOT REPORTED 01/25/2020    SPECGRAV 1.017 01/25/2020    LEUKOCYTESUR NEGATIVE 01/25/2020    UROBILINOGEN Normal 01/25/2020    BILIRUBINUR NEGATIVE 01/25/2020    GLUCOSEU NEGATIVE 01/25/2020    KETUA NEGATIVE 01/25/2020    AMORPHOUS NOT REPORTED 01/25/2020       HgBA1c:    Lab Results   Component Value Date    LABA1C 6.0 01/24/2019     TSH:    Lab Results   Component Value Date    TSH 1.27 01/25/2020     Lactic Acid:   Lab Results   Component Value Date    LACTA NOT REPORTED 01/26/2020    LACTA 1.3 12/21/2017      Troponin: No results for input(s): TROPONINI in the last 72 hours. Microbiology:      Radiology/Imaging:  XR CHEST PORTABLE   Final Result   Near complete resolution of pulmonary edema with some perihilar vascular   congestion remaining. Subsegmental atelectasis medial basal right lower lobe. CT CHEST PULMONARY EMBOLISM W CONTRAST   Final Result   No evidence of pulmonary embolism to the segmental level.   Motion artifact   challenge less likely. Recommend   follow-up brain MRI with and without intravenous contrast for further   evaluation. 4.  Mild chronic small vessel ischemic disease. 5.  Stable old bilateral occipital, left parietal and bilateral cerebellar   infarcts.          MRI BRAIN WO CONTRAST    (Results Pending)   XR CHEST (SINGLE VIEW FRONTAL)    (Results Pending)       ASSESSMENT:     Patient Active Problem List    Diagnosis Date Noted    Altered mental status, unspecified 01/25/2020    Cataracts, bilateral 12/19/2019    Transient ischemic attack (TIA) 12/19/2019    Internal carotid artery stenosis, right: 50 to 69% 12/19/2019    Vision loss, bilateral 12/18/2019    Cerebellar infarct ([address] 12/18/2019    Left temporal lobe infarction ([address] 12/18/2019    Stroke-like symptom 12/18/2019    Vertebrobasilar insufficiency     History of CVA (cerebrovascular accident)     Visual disturbance 01/24/2019    Memory loss of unknown cause 12/03/2018    Prolonged Q-T interval on ECG 01/14/2017    Iron deficiency anemia     Symptomatic anemia     History of GI bleed     Hypotension     Visual impairment severe bilaterally 12/10/2016    Hypotension due to blood loss     Acute ischemic stroke ([address]     Syncope and collapse 12/08/2016    Multi-infarct state 12/08/2016    Left homonymous hemianopsia 12/08/2016    Occipital infarction ([address]     Cerebellar cerebrovascular accident (CVA) without late effect 10/10/2016    Arthritis of knee 05/19/2016    Diverticulosis of large intestine without hemorrhage 04/25/2016    Anemia 04/02/2016    Hematemesis     Chronic fatigue     PUD (peptic ulcer disease)     History of colon polyps     Adenomatous colon polyp     AVM (arteriovenous malformation) of duodenum, acquired 03/09/2016    Gastrointestinal hemorrhage with hematemesis 03/01/2016    Acute gastric ulcer with hemorrhage 02/24/2016    Coronary artery disease involving native coronary artery of native heart without angina pectoris     Benign essential HTN     Severe anemia 01/13/2016    Acute lower GI bleeding 01/13/2016    Hypokalemia 12/19/2015    Hyperglycemia 12/16/2015    Hypertension 11/18/2015    Colon polyp     LEVI (acute kidney injury) ([facility] [address] 11/14/2015    Mild malnutrition ([facility] [address] 11/14/2015    Tobacco use 11/14/2015    Gastrointestinal hemorrhage     Acute blood loss anemia 11/13/2015    Gastric ulcer 11/13/2015    Sigmoid polyp 07/25/2015    Gastritis 07/25/2015    GI bleed     Upper GI bleed 07/22/2015    Blood loss anemia 07/22/2015    Epigastric pain 04/08/2014    Hyperlipidemia 07/30/2013    GERD (gastroesophageal reflux disease) 07/30/2013    Dizzy 07/30/2013        PLAN:      WEAN PER PROTOCOL:  []   No  []   Yes []   N/A                         ICU PROPHYLAXIS:                Stress ulcer:  []   PPI Agent []   [id number] []   Sucralfate []   Other []   None      VTE:  [x]   Enoxaparin []   SQ Heparin []   EPC Cuffs []  Other                       NUTRITION:   []  NPO [x]  PO []   TPN []   PO                       HOME MEDS RECONCILED:  []   No  [x]   Yes                           CONSULTATION NEEDED:  []   No  [x]   Yes                           FAMILY UPDATED:  []   No  []   Yes                           TRANSFER OUT OF ICU:  []   No  [x]   Yes             PLAN/MEDICAL DECISION MAKING:    Mentation imprvoed, BP stable on home meds, baljinder PO, mild headache no nuero deficits will give tylenol    tfx out of icu, remainder of stroke team workup to be completed, baljinder 2 L NC well       CODE STATUS: Full Code    DISPOSITION:  [] To remain ICU:   [x] OK for out of ICU from [address] [place] [address]  Emergency Medicine Resident, PGY2  Critical Care Service   01/26/20 2:46 PM

## 2020-01-26 NOTE — H&P
Critical Care - History and Physical Examination    Patient's name:  Aline Plasencia  Medical Record Number: [id number]  Patient's account/billing number: [de-identified]  Patient's YOB: 1955  Age: 71 y.o. Date of Admission: 1/25/2020  5:14 PM  Reason of ICU admission: hypoxia, AMS, hypertensive encephalopathy  Date of History and Physical Examination: 1/25/2020    Primary Care Physician: CESAR Escobar CNP  Attending Physician: Dr Sima Baker Status: Prior    Chief complaint:   Chief Complaint   Patient presents with    Loss of Vision     Pt reports gradual loss of vision x 1 month. Pt with hx of similiar symptoms with TIA in the past            History Of Present Illness:   History was obtained from chart review and unobtainable from patient due to mental status. In summary patient with a history of prior TIAs, CVA, evidence of prior occipital infarcts, prior coronary disease hypertension hyperlipidemia with carotid stenosis status post stent placement currently taking 81 mg aspirin daily as well as lisinopril and metoprolol presented to the ER with change in mental status.   Patient was GCS 12-13 not responding to questions or opening eyes found to be hypertensive systolic in 410D initially, patient was called a stroke alert also had nicardipine drip started for blood pressure management for possible hypertensive emergency, ER notes patient's mentation improved once blood pressure was reduced to 180s to 160s, patient had CT head, CTA head neck performed and assessment by the stroke team which showed no acute neurologic deficits no significant stenosis or acute large vessel occlusion, stroke team is ordered an MRI and echocardiogram and carotid ultrasound further typical stroke work-up, there was found to be pre-existing unchanged evidence of infarcts in the occipital lobes similar to previous studies    Patient also was initially brought on nasal cannula but desaturated to mid to however pupils are equal, there is no focal weakness there is no tremor there is no nystagmus or evidence of seizure activity    Extremities:  peripheral pulses normal, no pedal edema, no clubbing or cyanosis    Laboratory findings:-  CBC:   Recent Labs     01/25/20  1742   WBC 8.7   HGB 15.0        BMP:    Recent Labs     01/25/20  1725 01/25/20  1902   NA  --  139   K  --  3.9   CL  --  103   CO2  --  24   BUN  --  12   CREATININE 1.18 0.90   GLUCOSE  --  141*     S. Calcium:  Recent Labs     01/25/20  1902   CALCIUM 8.7     S. Ionized Calcium:No results for input(s): IONCA in the last 72 hours. S. Magnesium:No results for input(s): MG in the last 72 hours. S. Phosphorus:No results for input(s): PHOS in the last 72 hours. S. Glucose:  Recent Labs     01/25/20  1725   POCGLU 101*     Glycosylated hemoglobin A1C: No results for input(s): LABA1C in the last 72 hours. INR: No results for input(s): INR in the last 72 hours. Hepatic functions: No results for input(s): ALKPHOS, ALT, AST, PROT, BILITOT, BILIDIR, LABALBU in the last 72 hours. Pancreatic functions:No results for input(s): LACTA, AMYLASE in the last 72 hours. S. Lactic Acid: No results for input(s): LACTA in the last 72 hours. Cardiac enzymes:No results for input(s): CKTOTAL, CKMB, CKMBINDEX, TROPONINI in the last 72 hours. BNP:No results for input(s): BNP in the last 72 hours. Lipid profile: No results for input(s): CHOL, TRIG, HDL, LDLCALC in the last 72 hours.     Invalid input(s): LDL  Blood Gases: No results found for: PH, PCO2, PO2, HCO3, O2SAT  Thyroid functions:   Lab Results   Component Value Date    TSH 1.27 01/25/2020        Urinalysis:     Microbiology:  Cultures during this admission:   Blood cultures:                 [] None drawn      [] Negative             []  Positive (Details:  )  Urine Culture:                   [] None drawn      [] Negative             []  Positive (Details:  )  Sputum Culture:               [] None Cerebellar infarct ([address]    Left temporal lobe infarction (HCC)    Stroke-like symptom    Cataracts, bilateral    Transient ischemic attack (TIA)    Internal carotid artery stenosis, right: 50 to 69%       Additional assessment:  Rommel Gibbs is a 71 y.o. male who intially presented on 1/25/2020 for   Chief Complaint   Patient presents with    Loss of Vision     Pt reports gradual loss of vision x 1 month. Pt with hx of similiar symptoms with TIA in the past        Primary concerns are patient's neurologic status, this appears to be possibly his baseline, he is not opening eyes except the pain he has maintain his airway will occasionally get agitated open his eyes sit up and be uncooperative with nursing and myself, otherwise will nod his head yes and no to basic questions however refuses to answer orientation questions for me. Does have equal  strength and equal plantar flexion on my exam pupils are 3 mm and briskly reactive. Lower concern for encephalitis given patient's mentation seem to improve with blood pressure management. Acute stroke is very unlikely per stroke team's assessment, patient's original complaint of vision loss appears to be unchanged from his baseline. My plan for mentation is to get ED tox, patient's ammonia is elevated LFTs were ordered, patient was given Haldol in the ER to help facilitate exam as patient was becoming uncooperative ripping out his Tsein and attempting to repeat his IV. Patient is not significantly hypercapnic his CO2 was in the 50s on VBG, he has no acute bleed, he has no major electrolyte abnormalities, there is no evidence of infection at this time in the chest, blood cultures are pending, urine and urine cultures are pending. Primary concern is hypertensive encephalopathy, we will continue blood pressure management to keep blood pressure below 180 with nicardipine drip.     Concern from respiratory standpoint or desaturation in the ER requiring

## 2020-01-26 NOTE — ED NOTES
Condom catheter applied, pt responsive to staff applying catheter      Yang Montilla RN  01/25/20 4655

## 2020-01-26 NOTE — ED PROVIDER NOTES
Basophils 0 0 - 2 %    Immature Granulocytes 0 0 %    Segs Absolute 6.33 1.50 - 8.10 k/uL    Absolute Lymph # 1.42 1.10 - 3.70 k/uL    Absolute Mono # 0.88 0.10 - 1.20 k/uL    Absolute Eos # 0.04 0.00 - 0.44 k/uL    Basophils Absolute <0.03 0.00 - 0.20 k/uL    Absolute Immature Granulocyte <0.03 0.00 - 0.30 k/uL    WBC Morphology NOT REPORTED     RBC Morphology ANISOCYTOSIS PRESENT     Platelet Estimate NOT REPORTED    URINALYSIS   Result Value Ref Range    Color, UA YELLOW YELLOW    Turbidity UA CLEAR CLEAR    Glucose, Ur NEGATIVE NEGATIVE    Bilirubin Urine NEGATIVE NEGATIVE    Ketones, Urine NEGATIVE NEGATIVE    Specific Gravity, UA 1.017 1.005 - 1.030    Urine Hgb MODERATE (A) NEGATIVE    pH, UA 7.0 5.0 - 8.0    Protein, UA 2+ (A) NEGATIVE    Urobilinogen, Urine Normal Normal    Nitrite, Urine NEGATIVE NEGATIVE    Leukocyte Esterase, Urine NEGATIVE NEGATIVE    Urinalysis Comments NOT REPORTED    SPECIMEN REJECTION   Result Value Ref Range    Specimen Source . BLOOD     Ordered Test BMP TSH     Reason for Rejection Unable to perform testing: Specimen hemolyzed.      - NOT REPORTED    Basic Metabolic Panel   Result Value Ref Range    Glucose 141 (H) 70 - 99 mg/dL    BUN 12 8 - 23 mg/dL    CREATININE 0.90 0.70 - 1.20 mg/dL    Bun/Cre Ratio NOT REPORTED 9 - 20    Calcium 8.7 8.6 - 10.4 mg/dL    Sodium 139 135 - 144 mmol/L    Potassium 3.9 3.7 - 5.3 mmol/L    Chloride 103 98 - 107 mmol/L    CO2 24 20 - 31 mmol/L    Anion Gap 12 9 - 17 mmol/L    GFR Non-African American >60 >60 mL/min    GFR African American >60 >60 mL/min    GFR Comment          GFR Staging NOT REPORTED    TSH without Reflex   Result Value Ref Range    TSH 1.27 0.30 - 5.00 mIU/L   AMMONIA   Result Value Ref Range    Ammonia 82 (H) 16 - 60 umol/L   Brain Natriuretic Peptide   Result Value Ref Range    Pro- (H) <300 pg/mL    BNP Interpretation Pro-BNP Reference Range:    Troponin   Result Value Ref Range    Troponin, High Sensitivity <6 0 - 22 ng/L    Troponin T NOT REPORTED <0.03 ng/mL    Troponin Interp NOT REPORTED    Microscopic Urinalysis   Result Value Ref Range    -          WBC, UA 0 TO 2 0 - 5 /HPF    RBC, UA 5 TO 10 0 - 4 /HPF    Casts UA  0 - 8 /LPF     2 TO 5 HYALINE Reference range defined for non-centrifuged specimen. Crystals UA NOT REPORTED None /HPF    Epithelial Cells UA 2 TO 5 0 - 5 /HPF    Renal Epithelial, Urine NOT REPORTED 0 /HPF    Bacteria, UA NOT REPORTED None    Mucus, UA NOT REPORTED None    Trichomonas, UA NOT REPORTED None    Amorphous, UA NOT REPORTED None    Other Observations UA NOT REPORTED NOT REQ. Yeast, UA NOT REPORTED None   Venous Blood Gas, POC   Result Value Ref Range    pH, Tino 7.322 7.320 - 7.430    pCO2, Tino 58.8 (H) 41.0 - 51.0 mm Hg    pO2, Tino 47.2 30.0 - 50.0 mm Hg    HCO3, Venous 30.4 (H) 22.0 - 29.0 mmol/L    Total CO2, Venous 32 (H) 23.0 - 30.0 mmol/L    Negative Base Excess, Tino NOT REPORTED 0.0 - 2.0    Positive Base Excess, Tino 2 0.0 - 3.0    O2 Sat, Tino 78 60.0 - 85.0 %    O2 Device/Flow/% NOT REPORTED     Bruce Test NOT REPORTED     Sample Site NOT REPORTED     Mode NOT REPORTED     FIO2 NOT REPORTED     Pt Temp NOT REPORTED     POC pH Temp NOT REPORTED     POC pCO2 Temp NOT REPORTED mm Hg    POC pO2 Temp NOT REPORTED mm Hg   Creatinine W/GFR Point of Care   Result Value Ref Range    POC Creatinine 1.18 0.51 - 1.19 mg/dL    GFR Comment >60 >60 mL/min    GFR Non-African American >60 >60 mL/min    GFR Comment         Lactic Acid, POC   Result Value Ref Range    POC Lactic Acid 0.85 0.56 - 1.39 mmol/L   POCT Glucose   Result Value Ref Range    POC Glucose 101 (H) 74 - 100 mg/dL   Anion Gap (Calc) POC   Result Value Ref Range    Anion Gap 9 7 - 16 mmol/L       CT CHEST PULMONARY EMBOLISM W CONTRAST   Final Result   No evidence of pulmonary embolism to the segmental level. Motion artifact   challenge evaluation of the distal branches.       Cardiomegaly with interlobular septal thickening identified and patchy   ground-glass opacities. These are findings worrisome for vascular congestion   and pulmonary edema. Findings worrisome for round atelectasis within the right lower lobe. Mass/pneumonia could have a similar appearance. Consider 3 month follow-up   to document stability versus resolution. Small effusions. XR CHEST PORTABLE   Final Result   Moderate CHF increased         CTA HEAD NECK W CONTRAST   Final Result   1. No intracranial proximal large artery focal high-grade stenosis or   occlusion. 2.  No hemodynamically significant stenosis in the bilateral cervical   internal carotid arteries per NASCET criteria. Right greater than left   carotid bulb atherosclerotic plaque. 3.  No acute intracranial hemorrhage. Small patchy area of hypoattenuation   in the left insular/subinsular region was not definitely seen in the prior   study and could relate to artifact versus acute ischemia. Underlying   neoplastic or infectious process is considered less likely. Recommend   follow-up brain MRI with and without intravenous contrast for further   evaluation. 4.  Mild chronic small vessel ischemic disease. 5.  Stable old bilateral occipital, left parietal and bilateral cerebellar   infarcts. CT Head WO Contrast   Final Result   1. No intracranial proximal large artery focal high-grade stenosis or   occlusion. 2.  No hemodynamically significant stenosis in the bilateral cervical   internal carotid arteries per NASCET criteria. Right greater than left   carotid bulb atherosclerotic plaque. 3.  No acute intracranial hemorrhage. Small patchy area of hypoattenuation   in the left insular/subinsular region was not definitely seen in the prior   study and could relate to artifact versus acute ischemia. Underlying   neoplastic or infectious process is considered less likely.   Recommend   follow-up brain MRI with and without intravenous contrast for further

## 2020-01-26 NOTE — PROGRESS NOTES
Speech Language Pathology  Facility/Department: [address] SICU  Initial Cognitive Assessment    NAME: Dane Martinez  : [id number]   MRN: [id number]  ADMISSION DATE: 2020  ADMITTING DIAGNOSIS: has Hyperlipidemia; GERD (gastroesophageal reflux disease); Dizzy; Epigastric pain; Upper GI bleed; Blood loss anemia; GI bleed; Sigmoid polyp; Gastritis; Acute blood loss anemia; Gastric ulcer; Gastrointestinal hemorrhage; LEVI (acute kidney injury) ([address] Mild malnutrition ([address] Tobacco use; Colon polyp; Hypertension; Hyperglycemia; Hypokalemia; Severe anemia; Acute lower GI bleeding; Coronary artery disease involving native coronary artery of native heart without angina pectoris; Benign essential HTN; Acute gastric ulcer with hemorrhage; Gastrointestinal hemorrhage with hematemesis; AVM (arteriovenous malformation) of duodenum, acquired; History of colon polyps; Adenomatous colon polyp; Hematemesis; Chronic fatigue; PUD (peptic ulcer disease); Anemia; Diverticulosis of large intestine without hemorrhage; Arthritis of knee; Cerebellar cerebrovascular accident (CVA) without late effect; Syncope and collapse; Multi-infarct state; Left homonymous hemianopsia; Occipital infarction ([address] Visual impairment severe bilaterally; Hypotension due to blood loss; Acute ischemic stroke ([address] Hypotension; Symptomatic anemia; History of GI bleed; Iron deficiency anemia; Prolonged Q-T interval on ECG; Memory loss of unknown cause; Visual disturbance; Vision loss, bilateral; Vertebrobasilar insufficiency; History of CVA (cerebrovascular accident); Cerebellar infarct ([address] Left temporal lobe infarction ([address] Stroke-like symptom; Cataracts, bilateral; Transient ischemic attack (TIA);  Internal carotid artery stenosis, right: 50 to 69%; and Altered mental status, unspecified on their problem list.    Date of Eval: 2020   Evaluating Therapist: TESFAYE Alex    RECENT RESULTS CT OF HEAD: (  2020  )    Impression Orientation Status: Impaired  Orientation Level: Oriented to person;Disoriented to place; Disoriented to time;Disoriented to situation  Attention  Attention: Within Functional Limits  Memory  Memory: Exceptions to [facility]  Short-term Memory:  Moderate(Delayed Recall: 1/3; 0/3)  Working Memory: (Immediate Recall: 3/3; 5/5; 3/3)  Problem Solving  Problem Solving: Within Functional Limits  Abstract Reasoning  Abstract Reasoning: Within Functional Limits  Safety/Judgement  Safety/Judgement: Within Functional Limits  Verbal Sequencing: WFL  Thought Organization: [facility]  Word Generation: WFL    Prognosis:  Speech Therapy Prognosis  Prognosis: Fair  Individuals consulted  Consulted and agree with results and recommendations: Patient    Education:  Patient Education: yes  Patient Education Response: Verbalizes understanding          Therapy Time:   Individual Concurrent Group Co-treatment   Time In 1649         Time Out 1108         Minutes 9                 GONZALEZ Colby    1/26/2020 11:16 AM

## 2020-01-26 NOTE — PLAN OF CARE
Problem: Confusion - Acute:  Goal: Absence of continued neurological deterioration signs and symptoms  Description  Absence of continued neurological deterioration signs and symptoms  1/26/2020 1608 by Bre Mathis RN  Outcome: Ongoing  1/26/2020 1134 by Milton Giraldo RN  Outcome: Ongoing  1/26/2020 0221 by Jeimy Snyder RN  Outcome: Ongoing  Goal: Mental status will be restored to baseline  Description  Mental status will be restored to baseline  1/26/2020 1608 by Bre Mathis RN  Outcome: Ongoing  1/26/2020 1134 by Milton Giraldo RN  Outcome: Ongoing  1/26/2020 0221 by Jeimy Snyder RN  Outcome: Ongoing     Problem: Discharge Planning:  Goal: Ability to perform activities of daily living will improve  Description  Ability to perform activities of daily living will improve  1/26/2020 1608 by Bre Mathis RN  Outcome: Ongoing  1/26/2020 1134 by Milton Giraldo RN  Outcome: Ongoing  1/26/2020 0221 by Jeimy Snyder RN  Outcome: Ongoing  Goal: Participates in care planning  Description  Participates in care planning  1/26/2020 1608 by Bre Mathis RN  Outcome: Ongoing  1/26/2020 1134 by Milton Giraldo RN  Outcome: Ongoing  1/26/2020 0221 by Jeimy Snyder RN  Outcome: Ongoing     Problem: Injury - Risk of, Physical Injury:  Goal: Absence of physical injury  Description  Absence of physical injury  1/26/2020 1608 by Bre Mathis RN  Outcome: Ongoing  1/26/2020 1134 by Milton Giraldo RN  Outcome: Ongoing  1/26/2020 0221 by Jeimy Snyder RN  Outcome: Ongoing  Goal: Will remain free from falls  Description  Will remain free from falls  1/26/2020 1608 by Bre Mathis RN  Outcome: Ongoing  1/26/2020 1134 by Milton Giraldo RN  Outcome: Ongoing  1/26/2020 0221 by Jeimy Snyder RN  Outcome: Ongoing     Problem: Mood - Altered:  Goal: Mood stable  Description  Mood stable  1/26/2020 1608 by Bre Mathis RN  Outcome: Ongoing  1/26/2020 1134 by Milton Giraldo RN  Outcome: Ongoing  1/26/2020 0221 accurate environmental perceptions  Description  Demonstrates accurate environmental perceptions  1/26/2020 1608 by Adia Esteban RN  Outcome: Ongoing  1/26/2020 1134 by Audra Carvajal RN  Outcome: Ongoing  1/26/2020 0221 by Tena Honeycutt RN  Outcome: Ongoing  Goal: Able to distinguish between reality-based and nonreality-based thinking  Description  Able to distinguish between reality-based and nonreality-based thinking  1/26/2020 1608 by Adia Esteban RN  Outcome: Ongoing  1/26/2020 1134 by Audra Carvajal RN  Outcome: Ongoing  1/26/2020 0221 by Tena Honeycutt RN  Outcome: Ongoing  Goal: Able to interrupt nonreality-based thinking  Description  Able to interrupt nonreality-based thinking  1/26/2020 1608 by Adia Esteban RN  Outcome: Ongoing  1/26/2020 1134 by Audra Carvajal RN  Outcome: Ongoing  1/26/2020 0221 by Tena Honeyuctt RN  Outcome: Ongoing     Problem: Sleep Pattern Disturbance:  Goal: Appears well-rested  Description  Appears well-rested  1/26/2020 1608 by Adia Esteban RN  Outcome: Ongoing  1/26/2020 1134 by Audra Carvajal RN  Outcome: Ongoing  1/26/2020 0221 by Tean Honeycutt RN  Outcome: Ongoing     Problem: Risk for Impaired Skin Integrity  Goal: Tissue integrity - skin and mucous membranes  Description  Structural intactness and normal physiological function of skin and  mucous membranes.   1/26/2020 1608 by Adia Esteban RN  Outcome: Ongoing  1/26/2020 1134 by Audra Carvajal RN  Outcome: Ongoing  1/26/2020 0221 by Tena Honeycutt RN  Outcome: Ongoing

## 2020-01-27 VITALS
DIASTOLIC BLOOD PRESSURE: 65 MMHG | BODY MASS INDEX: 28.18 KG/M2 | WEIGHT: 190.26 LBS | HEART RATE: 66 BPM | HEIGHT: 69 IN | SYSTOLIC BLOOD PRESSURE: 151 MMHG | TEMPERATURE: 98.5 F | RESPIRATION RATE: 16 BRPM | OXYGEN SATURATION: 93 %

## 2020-01-27 LAB
ABSOLUTE EOS #: 0.05 K/UL (ref 0–0.44)
ABSOLUTE IMMATURE GRANULOCYTE: <0.03 K/UL (ref 0–0.3)
ABSOLUTE LYMPH #: 1.09 K/UL (ref 1.1–3.7)
ABSOLUTE MONO #: 0.82 K/UL (ref 0.1–1.2)
ANION GAP SERPL CALCULATED.3IONS-SCNC: 13 MMOL/L (ref 9–17)
BASOPHILS # BLD: 0 % (ref 0–2)
BASOPHILS ABSOLUTE: 0.03 K/UL (ref 0–0.2)
BUN BLDV-MCNC: 18 MG/DL (ref 8–23)
BUN/CREAT BLD: ABNORMAL (ref 9–20)
CALCIUM IONIZED: 1.08 MMOL/L (ref 1.13–1.33)
CALCIUM SERPL-MCNC: 8.7 MG/DL (ref 8.6–10.4)
CHLORIDE BLD-SCNC: 106 MMOL/L (ref 98–107)
CO2: 24 MMOL/L (ref 20–31)
CREAT SERPL-MCNC: 1.15 MG/DL (ref 0.7–1.2)
DIFFERENTIAL TYPE: ABNORMAL
EKG ATRIAL RATE: 77 BPM
EKG P AXIS: 56 DEGREES
EKG P-R INTERVAL: 160 MS
EKG Q-T INTERVAL: 384 MS
EKG QRS DURATION: 88 MS
EKG QTC CALCULATION (BAZETT): 434 MS
EKG R AXIS: 40 DEGREES
EKG T AXIS: 8 DEGREES
EKG VENTRICULAR RATE: 77 BPM
EOSINOPHILS RELATIVE PERCENT: 1 % (ref 1–4)
GFR AFRICAN AMERICAN: >60 ML/MIN
GFR NON-AFRICAN AMERICAN: >60 ML/MIN
GFR SERPL CREATININE-BSD FRML MDRD: ABNORMAL ML/MIN/{1.73_M2}
GFR SERPL CREATININE-BSD FRML MDRD: ABNORMAL ML/MIN/{1.73_M2}
GLUCOSE BLD-MCNC: 104 MG/DL (ref 70–99)
HCT VFR BLD CALC: 41.5 % (ref 40.7–50.3)
HEMOGLOBIN: 14.9 G/DL (ref 13–17)
IMMATURE GRANULOCYTES: 0 %
LACTIC ACID, WHOLE BLOOD: 1 MMOL/L (ref 0.7–2.1)
LACTIC ACID: NORMAL MMOL/L
LYMPHOCYTES # BLD: 12 % (ref 24–43)
MAGNESIUM: 2.1 MG/DL (ref 1.6–2.6)
MCH RBC QN AUTO: 33.7 PG (ref 25.2–33.5)
MCHC RBC AUTO-ENTMCNC: 35.9 G/DL (ref 28.4–34.8)
MCV RBC AUTO: 93.9 FL (ref 82.6–102.9)
MONOCYTES # BLD: 9 % (ref 3–12)
NRBC AUTOMATED: 0 PER 100 WBC
PDW BLD-RTO: 18.8 % (ref 11.8–14.4)
PHOSPHORUS: 2.4 MG/DL (ref 2.5–4.5)
PLATELET # BLD: 155 K/UL (ref 138–453)
PLATELET ESTIMATE: ABNORMAL
PMV BLD AUTO: 10 FL (ref 8.1–13.5)
POTASSIUM SERPL-SCNC: 3.6 MMOL/L (ref 3.7–5.3)
RBC # BLD: 4.42 M/UL (ref 4.21–5.77)
RBC # BLD: ABNORMAL 10*6/UL
SEG NEUTROPHILS: 77 % (ref 36–65)
SEGMENTED NEUTROPHILS ABSOLUTE COUNT: 6.76 K/UL (ref 1.5–8.1)
SODIUM BLD-SCNC: 143 MMOL/L (ref 135–144)
WBC # BLD: 8.8 K/UL (ref 3.5–11.3)
WBC # BLD: ABNORMAL 10*3/UL

## 2020-01-27 PROCEDURE — 2580000003 HC RX 258: Performed by: STUDENT IN AN ORGANIZED HEALTH CARE EDUCATION/TRAINING PROGRAM

## 2020-01-27 PROCEDURE — 85025 COMPLETE CBC W/AUTO DIFF WBC: CPT

## 2020-01-27 PROCEDURE — 99239 HOSP IP/OBS DSCHRG MGMT >30: CPT | Performed by: INTERNAL MEDICINE

## 2020-01-27 PROCEDURE — 2500000003 HC RX 250 WO HCPCS: Performed by: STUDENT IN AN ORGANIZED HEALTH CARE EDUCATION/TRAINING PROGRAM

## 2020-01-27 PROCEDURE — 84100 ASSAY OF PHOSPHORUS: CPT

## 2020-01-27 PROCEDURE — 83605 ASSAY OF LACTIC ACID: CPT

## 2020-01-27 PROCEDURE — 6370000000 HC RX 637 (ALT 250 FOR IP): Performed by: STUDENT IN AN ORGANIZED HEALTH CARE EDUCATION/TRAINING PROGRAM

## 2020-01-27 PROCEDURE — 6360000002 HC RX W HCPCS: Performed by: STUDENT IN AN ORGANIZED HEALTH CARE EDUCATION/TRAINING PROGRAM

## 2020-01-27 PROCEDURE — 36415 COLL VENOUS BLD VENIPUNCTURE: CPT

## 2020-01-27 PROCEDURE — 80048 BASIC METABOLIC PNL TOTAL CA: CPT

## 2020-01-27 PROCEDURE — 99232 SBSQ HOSP IP/OBS MODERATE 35: CPT | Performed by: NURSE PRACTITIONER

## 2020-01-27 PROCEDURE — 94640 AIRWAY INHALATION TREATMENT: CPT

## 2020-01-27 PROCEDURE — 93010 ELECTROCARDIOGRAM REPORT: CPT | Performed by: INTERNAL MEDICINE

## 2020-01-27 PROCEDURE — 82570 ASSAY OF URINE CREATININE: CPT

## 2020-01-27 PROCEDURE — 82330 ASSAY OF CALCIUM: CPT

## 2020-01-27 PROCEDURE — 83735 ASSAY OF MAGNESIUM: CPT

## 2020-01-27 PROCEDURE — 97535 SELF CARE MNGMENT TRAINING: CPT

## 2020-01-27 PROCEDURE — 97166 OT EVAL MOD COMPLEX 45 MIN: CPT

## 2020-01-27 RX ORDER — ATORVASTATIN CALCIUM 80 MG/1
80 TABLET, FILM COATED ORAL NIGHTLY
Qty: 30 TABLET | Refills: 3 | Status: ON HOLD | OUTPATIENT
Start: 2020-01-27 | End: 2020-07-12 | Stop reason: SDUPTHER

## 2020-01-27 RX ORDER — FAMOTIDINE 20 MG/1
20 TABLET, FILM COATED ORAL 2 TIMES DAILY
Status: DISCONTINUED | OUTPATIENT
Start: 2020-01-27 | End: 2020-01-27 | Stop reason: HOSPADM

## 2020-01-27 RX ORDER — FUROSEMIDE 40 MG/1
40 TABLET ORAL DAILY
Status: DISCONTINUED | OUTPATIENT
Start: 2020-01-27 | End: 2020-01-27 | Stop reason: HOSPADM

## 2020-01-27 RX ORDER — IPRATROPIUM BROMIDE AND ALBUTEROL SULFATE 2.5; .5 MG/3ML; MG/3ML
1 SOLUTION RESPIRATORY (INHALATION) EVERY 4 HOURS PRN
Status: DISCONTINUED | OUTPATIENT
Start: 2020-01-27 | End: 2020-01-27 | Stop reason: HOSPADM

## 2020-01-27 RX ORDER — FUROSEMIDE 20 MG/1
20 TABLET ORAL DAILY
Qty: 30 TABLET | Refills: 2 | Status: ON HOLD | OUTPATIENT
Start: 2020-01-28 | End: 2020-07-12 | Stop reason: HOSPADM

## 2020-01-27 RX ADMIN — ENOXAPARIN SODIUM 40 MG: 40 INJECTION SUBCUTANEOUS at 08:33

## 2020-01-27 RX ADMIN — IPRATROPIUM BROMIDE AND ALBUTEROL SULFATE 1 AMPULE: .5; 3 SOLUTION RESPIRATORY (INHALATION) at 08:27

## 2020-01-27 RX ADMIN — FUROSEMIDE 40 MG: 40 TABLET ORAL at 08:33

## 2020-01-27 RX ADMIN — Medication 1000 MCG: at 08:33

## 2020-01-27 RX ADMIN — METOPROLOL TARTRATE 12.5 MG: 25 TABLET ORAL at 08:33

## 2020-01-27 RX ADMIN — FAMOTIDINE 20 MG: 10 INJECTION, SOLUTION INTRAVENOUS at 08:33

## 2020-01-27 RX ADMIN — CALCIUM GLUCONATE 2 G: 98 INJECTION, SOLUTION INTRAVENOUS at 08:33

## 2020-01-27 RX ADMIN — LISINOPRIL 5 MG: 5 TABLET ORAL at 08:33

## 2020-01-27 RX ADMIN — Medication 81 MG: at 08:33

## 2020-01-27 ASSESSMENT — ENCOUNTER SYMPTOMS
COLOR CHANGE: 0
DIARRHEA: 0
EYE DISCHARGE: 0
CHEST TIGHTNESS: 0
EYE REDNESS: 0
ABDOMINAL DISTENTION: 0
ABDOMINAL PAIN: 0
EYE ITCHING: 0
SHORTNESS OF BREATH: 1
CONSTIPATION: 0
COUGH: 0
APNEA: 0
BLOOD IN STOOL: 0

## 2020-01-27 ASSESSMENT — PAIN SCALES - GENERAL: PAINLEVEL_OUTOF10: 0

## 2020-01-27 NOTE — PROGRESS NOTES
Patient and girlfriend given all discharge instructions and education. Patient and girlfriend voice no further questions or concerns. Patients IV discontinued and patient wheeled out of facility via wheelchair.

## 2020-01-27 NOTE — PROGRESS NOTES
GASTROINTESTINAL ENDOSCOPY  11/13/15    NORMAL SMALL BOWEL MUCOSA    UPPER GASTROINTESTINAL ENDOSCOPY  3/8/16    Small gastric ulcer less than 1 cm in the body of the stomach, no visible vessel, no blood staining the stomach, we injected 5 cc of 1/08794 epi around it Duodenal avm, cauterized using bicab gold probe, not actively bleeding. those two lesion could cause bleeding but the fact that bile is very clean with no blood old or fresh make that less likely the source of bleeding     UPPER GASTROINTESTINAL ENDOSCOPY  12/22/2017    Small ulcer in the upper part of the stomach with fresh bleeding. This ulcer is buried in between the folds. , endoclip applied    UPPER GASTROINTESTINAL ENDOSCOPY N/A 12/22/2017    EGD ESOPHAGOGASTRODUODENOSCOPY WITH APPLICATION OF CLIPS performed by Ranjeet Breen MD at [place] [id number] History: Janny Bryant  reports that he has been smoking cigarettes. He has a 30.00 pack-year smoking history. He has never used smokeless tobacco. He reports previous alcohol use. He reports that he does not use drugs. History reviewed. No pertinent family history.     Objective:   BP (!) 152/81   Pulse 70   Temp 98.8 °F (37.1 °C) (Oral)   Resp 16   Ht 5' 9\" (1.753 m)   Wt 190 lb 4.1 oz (86.3 kg)   SpO2 93%   BMI 28.10 kg/m²     Blood pressure range: Systolic (93DJV), YJB:692 , Min:113 , AQE:491   ; Diastolic (78RVA), IKB:44, Min:53, Max:89      Review of Systems:  Constitutional  Negative for fever and chills    HEENT  Negative for ear discharge, ear pain, nosebleed    Eyes  Negative for photophobia, pain and discharge    Respiratory  Negative for hemoptysis and sputum    Cardiovascular  Negative for orthopnea, claudication and PND    Gastrointestinal  Negative for abdominal pain, diarrhea, blood in stool    Musculoskeletal  Negative for joint pain, negative for myalgia    Skin  Negative for rash or itching    Endo/heme/allergies  Negative for polydipsia, environmental

## 2020-01-27 NOTE — PROGRESS NOTES
Restriction  Other position/activity restrictions: Up w/assist  Vision/Hearing  Vision: Impaired  Vision Exceptions: Wears glasses at all times(Pt reports \"being able to see shadows and colors\")  Hearing: Within functional limits     Subjective  General  Patient assessed for rehabilitation services?: Yes  Response To Previous Treatment: Not applicable  Family / Caregiver Present: No  Follows Commands: Within Functional Limits  Subjective  Subjective: RN and pt in agreement for PT eval; pt supine in bed upon PT arrival, flat affect, however cooperative throughout   Pain Screening  Patient Currently in Pain: Denies  Vital Signs  Patient Currently in Pain: Denies       Orientation  Orientation  Overall Orientation Status: Within Functional Limits  Social/Functional History  Social/Functional History  Lives With: Significant other(Pt reports a \"girlfriend and her adult kids\")  Type of Home: House  Home Layout: One level  Home Access: Stairs to enter with rails(Unable to give a number this date)  Entrance Stairs - Number of Steps: unsure but knows he has some   Bathroom Shower/Tub: Tub/Shower unit  Bathroom Toilet: Standard  ADL Assistance: Independent  Homemaking Assistance: Independent  Homemaking Responsibilities: Yes  Meal Prep Responsibility: Primary  Laundry Responsibility: Primary  Cleaning Responsibility: Primary  Shopping Responsibility: Primary  Ambulation Assistance: Independent(Pt reports ambulating without an AD at baseline)  Transfer Assistance: Independent  Active : No  Patient's  Info: spouse   Occupation: Retired  Leisure & Hobbies: relaxing   IADL Comments: reports that  most of the time wife is home with him   Cognition   Cognition  Overall Cognitive Status: Exceptions  Arousal/Alertness: Delayed responses to stimuli  Memory: Decreased recall of biographical Information  Safety Judgement: Decreased awareness of need for safety;Decreased awareness of need for assistance  Initiation: Requires cues for some  Sequencing: Requires cues for some    Objective    Joint Mobility  Spine: WFL  ROM RLE: WFL  ROM LLE: WFL  ROM RUE: WFL  ROM LUE: WFL  Strength RLE  Strength RLE: WFL  Strength LLE  Strength LLE: WFL  Strength RUE  Comment: See OT assessment- PT/ OT coeval   Strength LUE  Comment: See OT assessment- PT/ OT coeval     Sensation  Overall Sensation Status: WFL  Bed mobility  Supine to Sit: Contact guard assistance  Sit to Supine: Contact guard assistance  Comment: Pt mildly impulsive requiring VC's for safety with good return demo   Transfers  Sit to Stand: Contact guard assistance  Stand to sit: Contact guard assistance  Ambulation  Ambulation?: Yes  Ambulation 1  Surface: level tile  Device: No Device(IV pole)  Assistance: Contact guard assistance  Quality of Gait: Forward flexed posture  Gait Deviations: Shuffles  Distance: 100ft  Comments: No LOB noted throughout. Pt required VC's for navigation of objects in hallway with good return demo.   Stairs/Curb  Stairs?: No     Balance  Posture: Good  Sitting - Static: Good;-  Sitting - Dynamic: Good;-  Standing - Static: Good;-  Standing - Dynamic: Fair;+  Comments: Standing balance assessed w/ hand held assist on IV pole        Plan   Plan  Times per week: 5-6x/week  Current Treatment Recommendations: Strengthening, Transfer Training, Endurance Training, Patient/Caregiver Education & Training, ROM, Balance Training, Gait Training, Home Exercise Program, Functional Mobility Training, Stair training, Safety Education & Training  Safety Devices  Type of devices: Gait belt, Left in bed, Call light within reach, Nurse notified, Bed alarm in place  Restraints  Initially in place: No    AM-PAC Score     AM-PAC Inpatient Mobility without Stair Climbing Raw Score : 16 (01/27/20 1313)  AM-PAC Inpatient without Stair Climbing T-Scale Score : 45.54 (01/27/20 1313)  Mobility Inpatient CMS 0-100% Score: 40.64 (01/27/20 1313)  Mobility Inpatient without Stair CMS G-Code Modifier : CK (01/27/20 1313)       Goals  Short term goals  Time Frame for Short term goals: 14  Short term goal 1: Pt to perform bed mobility independently  Short term goal 2: Demonstrate functional transfers independently   Short term goal 3: Ambulate 300ft w/ no AD SBA demonstrating good safety awareness  Short term goal 4: Tolerate 45 minutes of therapy to demo increased endurance  Patient Goals   Patient goals :  To go home       Therapy Time   Individual Concurrent Group Co-treatment   Time In 1133         Time Out 1150         Minutes 17         Timed Code Treatment Minutes: 8 Minutes       Adilia Agrawal PT

## 2020-01-27 NOTE — PROGRESS NOTES
Occupational Therapy   Occupational Therapy Initial Assessment  Date: 2020   Patient Name: Jose Nettles  MRN: [id number]     : 1955    Date of Service: 2020    Discharge Recommendations:    Further therapy recommended at discharge. Assessment   Performance deficits / Impairments: Decreased functional mobility ; Decreased safe awareness;Decreased ADL status; Decreased high-level IADLs;Decreased endurance;Decreased cognition  Assessment: Patient is expected to need acute OT services at this time to address functional deficits that are impacting ADL/IADL and functional transfer/mobility performance and safety to return to OF and reduce risk for further ADL decline. Prognosis: Good  Decision Making: Medium Complexity  Patient Education: OT role, OT POC, purpose of evaluation, importance of OOB activity, safety awareness with functional mobility/transfers - good return   REQUIRES OT FOLLOW UP: Yes  Activity Tolerance  Activity Tolerance: Patient Tolerated treatment well  Safety Devices  Safety Devices in place: Yes  Type of devices: All fall risk precautions in place;Gait belt;Patient at risk for falls; Left in bed;Call light within reach  Restraints  Initially in place: No           Patient Diagnosis(es): The encounter diagnosis was Hypertensive encephalopathy. has a past medical history of Adenomatous colon polyp, Arthritis, CVA (cerebral vascular accident) ([facility] [address] Diverticulosis of colon, History of colon polyps, Hyperlipidemia, Hypertension, Internal carotid artery stenosis, right: 50 to 69%, MI (myocardial infarction) ([facility] [address] Poor historian, and Transient ischemic attack (TIA). has a past surgical history that includes Colonoscopy (07/25/15); Cardiac surgery (); Colonoscopy (12/15/15); hemicolectomy (Right, 12/15/15); Colon surgery (Right, 12/15/15); Upper gastrointestinal endoscopy (16); Upper gastrointestinal endoscopy (11/13/15);  Upper gastrointestinal endoscopy (3/8/16); Frame for Short term goals: Patient will, by discharge  Short term goal 1: demonstrate UB self-cares at Mod I   Short term goal 2: demonstrate LB self-cares at Mod I   Short term goal 3: demonstrate good safety awareness with functional transfers/mobility at Mod I   Short term goal 4: demonstrate ~30 min of functional activity tolerance to engage in ADLs        Therapy Time   Individual Concurrent Group Co-treatment   Time In 1010         Time Out 1031         Minutes 21                 Wilfredo Ricardo, OTR/L

## 2020-01-27 NOTE — PROGRESS NOTES
Conor Parker, , spoke with patients significant other who states that patient is at his baseline with his vision and mentation. States patient see's someone out patient for his vision. Will continue to monitor.

## 2020-01-27 NOTE — PROGRESS NOTES
CLINICAL PHARMACY NOTE: MEDS TO [address] Select Patient?: Yes  Total # of Prescriptions Filled: 1   The following medications were delivered to the patient:  · Furosemide  Total # of Interventions Completed: 1  Time Spent (min): 5    Additional Documentation:  Medication delivered to patient. He did not have any medication related questions. Told him to call the pharmacy if he has any questions. Prescription was sent over for atorvastatin, but could not be filled as it was last filled on 1/25/20.

## 2020-01-27 NOTE — PROGRESS NOTES
medical history of prior TIAs, CVA, evidence of prior occipital infarcts, prior coronary disease hypertension hyperlipidemia with carotid stenosis status post stent placement currently taking 81 mg aspirin daily as well as lisinopril and metoprolol presented to the ER with change in mental status.  Patient was GCS 12-13 not responding to questions or opening eyes found to be hypertensive systolic in 722I initially,  stroke alert was called, also had nicardipine drip started for blood pressure management for possible hypertensive emergency, ER notes patient's mentation improved once blood pressure was reduced to 180s to 160s, patient had CT head, CTA head neck performed and assessment by the stroke team which showed no acute neurologic deficits no significant stenosis or acute large vessel occlusion, stroke team is ordered an MRI and echocardiogram and carotid ultrasound further typical stroke work-up, there was found to be pre-existing unchanged evidence of infarcts in the occipital lobes similar to previous studies     Patient also was initially brought on nasal cannula but desaturated to mid to high 80s requiring a nonrebreather, concern was for possible intubation patient was weaned back to 4 L nasal cannula but is intermittently remained 88% to 92%.  Patient appears somewhat drowsy hypoventilating this may be contributing to the desaturations, due to respiratory status and decreased mentation decision was made for ICU admission.  Patient did receive Narcan by EMS and in the ER with no change       OBJECTIVE     Vital Signs:  BP (!) 152/81   Pulse 70   Temp 98.8 °F (37.1 °C) (Oral)   Resp 16   Ht 5' 9\" (1.753 m)   Wt 190 lb 3.2 oz (86.3 kg)   SpO2 93%   BMI 28.09 kg/m²     Temp (24hrs), Av.6 °F (37 °C), Min:97.8 °F (36.6 °C), Max:99.3 °F (37.4 °C)    In: 347.9   Out: 1075 [Urine:1075]    Physical Exam:  Physical Exam  Constitutional:       General: He is not in acute distress.      Appearance: Normal appearance. He is not ill-appearing. HENT:      Head: Normocephalic and atraumatic. Right Ear: There is no impacted cerumen. Left Ear: There is no impacted cerumen. Nose: Nose normal. No congestion or rhinorrhea. Mouth/Throat:      Mouth: Mucous membranes are moist.      Pharynx: Oropharynx is clear. No oropharyngeal exudate or posterior oropharyngeal erythema. Eyes:      General:         Right eye: No discharge. Left eye: No discharge. Extraocular Movements: Extraocular movements intact. Pupils: Pupils are equal, round, and reactive to light. Neck:      Musculoskeletal: Normal range of motion and neck supple. Cardiovascular:      Rate and Rhythm: Normal rate and regular rhythm. Pulses: Normal pulses. Heart sounds: Normal heart sounds. No murmur. No friction rub. Pulmonary:      Effort: Pulmonary effort is normal. No respiratory distress. Breath sounds: Normal breath sounds. No stridor. Abdominal:      General: Abdomen is flat. There is no distension. Palpations: Abdomen is soft. There is no mass. Musculoskeletal: Normal range of motion. General: No swelling or tenderness. Skin:     General: Skin is warm and dry. Findings: No bruising. Neurological:      General: No focal deficit present. Mental Status: He is alert and oriented to person, place, and time. Psychiatric:         Mood and Affect: Mood normal.         Behavior: Behavior normal.         Thought Content:  Thought content normal.         Judgment: Judgment normal.             Medications:  Scheduled Medications:    furosemide  40 mg Oral Daily    calcium gluconate IVPB  2 g Intravenous Once    sodium chloride flush  10 mL Intravenous 2 times per day    aspirin  81 mg Oral Daily    Or    aspirin  300 mg Rectal Daily    metoprolol tartrate  12.5 mg Oral BID    lisinopril  5 mg Oral Daily    QUEtiapine  25 mg Oral Once    sodium chloride flush  10 mL Intravenous 2 times per day    enoxaparin  40 mg Subcutaneous Daily    famotidine (PEPCID) injection  20 mg Intravenous BID    ipratropium-albuterol  1 ampule Inhalation Q4H WA    atorvastatin  80 mg Oral Nightly    nicotine  1 patch Transdermal Daily    vitamin B-12  1,000 mcg Oral Daily     Continuous Infusions:   PRN Medicationssodium chloride flush, 10 mL, PRN  magnesium hydroxide, 30 mL, Daily PRN  sodium chloride flush, 10 mL, PRN  ondansetron, 4 mg, Q6H PRN  acetaminophen, 650 mg, Q4H PRN        Diagnostic Labs:  CBC:   Recent Labs     01/25/20  1742 01/26/20  0443 01/27/20  0526   WBC 8.7 10.3 8.8   RBC 4.93 4.47 4.42   HGB 15.0 14.5 14.9   HCT 45.5 43.1 41.5   MCV 92.3 96.4 93.9   RDW 16.8* 17.8* 18.8*    179 155     BMP:   Recent Labs     01/25/20  1725 01/25/20  1902 01/26/20  0443   NA  --  139 139   K  --  3.9 4.0   CL  --  103 105   CO2  --  24 25   PHOS  --   --  4.1   BUN  --  12 13   CREATININE 1.18 0.90 1.00       ASSESSMENT & PLAN   1. Hypertensive encephalopathy, resolved-Weaned off Cardene drip,   2. HTN. Controlled. Continue Lisinopril 5 mg Po OD, Lopressor 12.5 mg PO BID  3. Acute on chronic systolic congestive heart failure- resolved. Continue Lasix 40 mg PO OD  4. Acute respiratory failure secondary to AMS- resolved. 5. Dyslipidemia-stable-Lipitor 80 mg PO OD  6. Discharge planning- going home today    DVT ppx : Lovenox  GI ppx: Pepcid    PT/OT: Onboard  Discharge Planning / [place]: as per     Dakota Sneed MD  Internal Medicine Resident, PGY-1  [facility]; [place], [place]  1/27/2020, 6:16 AM        Attending Physician Statement  I have discussed the case, including pertinent history and exam findings with the resident and the team.  I have seen and examined the patient and the key elements of the encounter have been performed by me. I agree with the assessment, plan and orders as documented by the resident.       In Brief:  Doing well  Vital stable  Ok for discharge    Erin Aguillon MD   Attending Physician, Internal Medicine Residency Program  1/27/2020, 1:02 PM

## 2020-01-27 NOTE — PROGRESS NOTES
Writer notes that patient has discharge order placed. Writer updates primary that patient has orders for EEG and 2 D Echo, and asked if they needed to be done prior to discharge. Per Dr. Carvel Frankel patient can be discharged without having testing done. Will continue to monitor.

## 2020-01-27 NOTE — PROGRESS NOTES
Baseline [x]  No Wheeze good aeration []  Faint, scattered wheezing, good aeration []  Expiratory Wheezing and or moderately diminished []  Insp/Exp wheeze and/or very diminished []  Insp/Exp and/ or marked distress   Respiratory Rate   []  Patient Baseline [x]  Less than 20 []  Less than 20 []  20-25 []  Greater than 25 []  Greater than 25   Peak flow % of Pred or PB [x]  NA   []  Greater than 90%  []  81-90% []  71-80% []  Less than or equal to 70%  or unable to perform []  Unable due to Respiratory Distress   Dyspnea re []  Patient Baseline [x]  No SOB []  No SOB []  SOB on exertion []  SOB min activity []  At rest/acute   e FEV% Predicted       [x]  NA []  Above 69%  []  Unable []  Above 60-69%  []  Unable []  Above 50-59%  []  Unable []  Above 35-49%  []  Unable []  Less than 35%  []  Unable                 []  Hyperinflation Assessment  Score 1 2 3   CXR and Breath Sounds   []  Clear []  No atelectasis  Basilar aeration []  Atelectasis or absent basilar breath sounds   Incentive Spirometry Volume  (Per IBW)   []  Greater than or equal to 15ml/Kg []  less than 15ml/Kg []  less than 15ml/Kg   Surgery within last 2 weeks []  None or general   []  Abdominal or thoracic surgery  []  Abdominal or thoracic   Chronic Pulmonary Historyre []  No []  Yes []  Yes     []  Secretion Management Assessment  Score 1 2 3   Bilateral Breath Sounds   []  Occasional Rhonchi []  Scattered Rhonchi []  Course Rhonchi and/or poor aeration   Sputum    []  Small amount of thin secretions []  Moderate amount of viscous secretions []  Copius, Viscious Yellow/ Secretions   CXR as reported by physician []  clear  []  Unavailable []  Infiltrates and/or consolidation  []  Unavailable []  Mucus Plugging and or lobar consolidation  []  Unavailable   Cough []  Strong, productive cough []  Weak productive cough []  No cough or weak non-productive cough   Rhea Hewitt  9:01 AM                            FEMALE                                  MALE

## 2020-01-28 ENCOUNTER — TELEPHONE (OUTPATIENT)
Dept: PHARMACY | Facility: CLINIC | Age: 65
End: 2020-01-28

## 2020-01-28 ENCOUNTER — CARE COORDINATION (OUTPATIENT)
Dept: CASE MANAGEMENT | Age: 65
End: 2020-01-28

## 2020-01-28 LAB
ESTIMATED AVERAGE GLUCOSE: 134 MG/DL
HBA1C MFR BLD: 6.3 % (ref 4–6)

## 2020-01-28 PROCEDURE — 1111F DSCHRG MED/CURRENT MED MERGE: CPT | Performed by: NURSE PRACTITIONER

## 2020-01-28 NOTE — CARE COORDINATION
[facility] Transitions Initial Follow Up Call    Call within 2 business days of discharge: Yes    Patient: Dane Martinez Patient : 1955   MRN: [id number]  Reason for Admission: Hypertensive Encephalopathy  Discharge Date: 20 RARS: Readmission Risk Score: 16      Last Discharge [facility]       Complaint Diagnosis Description Type Department Provider    20 Loss of Vision Hypertensive encephalopathy ED to Hosp-Admission (Discharged) (ADMITTED) [facility] Erin Childs MD; Jamee Krueger... Attempted initial 24 hour transitional call to patient. Left VM to return call directly to [phone], [phone]. 1st attempt.      Facility: Jessica Cheung RN

## 2020-01-28 NOTE — CARE COORDINATION
[facility] Transitions Initial Follow Up Call    Call within 2 business days of discharge: Yes    Patient: Dane Martinez Patient : 1955   MRN: [id number]  Reason for Admission: Hypertensive Encephalopathy  Discharge Date: 20 RARS: Readmission Risk Score: 16      Last Discharge [facility]       Complaint Diagnosis Description Type Department Provider    20 Loss of Vision Hypertensive encephalopathy ED to Hosp-Admission (Discharged) (ADMITTED) [facility] Erin Childs MD; Jamee Krueger... Spoke with: FLOR Scott    Facility: [facility]    Non-face-to-face services provided:  Scheduled appointment with PCP-With PCP on 20  Obtained and reviewed discharge summary and/or continuity of care documents     Spoke with patient's girlfriend who states patient is doing well. He denies any chest pains, shortness of breath, headaches, difficulty speaking or swallowing, numbness or tingling. Per girlfriend he is back to his regular self and doing fine. Medications reviewed with the girlfriend, he has all medications at home. He has his follow up with his PCP on Friday. They declined the need for home care at this time. Denies any further needs or concerns at this time.      Care Transitions 24 Hour Call    Schedule Follow Up Appointment with PCP:  Completed  Do you have any ongoing symptoms?:  No  Do you have a copy of your discharge instructions?:  Yes  Do you have all of your prescriptions and are they filled?:  Yes  Have you been contacted by a [facility] Pharmacist?:  No  Have you scheduled your follow up appointment?:  Yes  How are you going to get to your appointment?:  Car - family or friend to transport  Were you discharged with any Home Care or Post Acute Services:  No  Do you feel like you have everything you need to keep you well at home?:  Yes  Care Transitions Interventions                                 Follow Up  Future Appointments   Date Time Provider Indiana Barber 1/31/2020 10:00 AM CESAR Huizar CNP [facility]   3/9/2020  1:00 PM CESAR Huizar CNP [facility] IN [address] [place] [address]   3/10/2020  3:00 PM Veronique Knight DO Neuro [facility]

## 2020-01-30 ENCOUNTER — CARE COORDINATION (OUTPATIENT)
Dept: CASE MANAGEMENT | Age: 65
End: 2020-01-30

## 2020-01-30 NOTE — TELEPHONE ENCOUNTER
CLINICAL PHARMACY NOTE - Post-Discharge Transitions of Care (ISMAEL)    Second attempt made to reach patient by telephone for medication review. Spoke with patient - refused complete medication review at this time as med rec completed with [facility] already, states no questions or concerns at this time. When asked, patient stated he had not yet picked up his new furosemide prescription. Reminded patient to obtain this and start therapy - he verbalized understanding. No DDIs or renal dose adjustments noted. Patient was reminded of upcoming visit with PCP. Lyn Augustin, Pharm. D.  PGY2 Ambulatory Care Pharmacist Resident   [facility]) Medication Management Service  [phone]  1/30/2020 10:52 AM    I have discussed the care of this patient with the resident and I agree with the assessment/plan as documented.      Opal Mesa, YakovD, Antonio Estrella, [facility]  Ambulatory Clinical Care Pharmacist   [phone]    [facility]  [phone], Option 7    =======================================================    For Pharmacy Admin Tracking Only  TCM Call Made?: Yes  [facility]) Select Patient?: Yes  Total # of Interventions Recommended: 0  Total # Interventions Accepted: 0  Intervention Severity:   - Level 1 Intervention Present?: No   - Level 2 #: 0   - Level 3 #: 0  Outreach Status: Patient Refused  Care Coordinator Outreach to Patient?: Yes  Provider Contacted?: No  Time Spent (min): 30

## 2020-01-30 NOTE — CARE COORDINATION
[facility] Transitions Follow Up Call    2020    Patient: Estrella Dhaliwal  Patient : 1955   MRN: [id number]  Reason for Admission: Hypertensive encephalopathy  Discharge Date: 20 RARS: Readmission Risk Score: 12         Spoke with: Alden, patient's SO    Spoke with girlfriend who states that patient is doing well at home. He has had no numbness, tingling, confusion, headaches or other s/s of HTN. She reports he has all of his medications and has been taking them as directed. He has a follow up with his PCP tomorrow and they are aware of the appointment. They deny any further needs or concerns at this time. Care Transitions Subsequent and Final Call    Schedule Follow Up Appointment with PCP:  Completed  Subsequent and Final Calls  Do you have any ongoing symptoms?:  No  Have your medications changed?:  No  Do you have any questions related to your medications?:  No  Do you currently have any active services?:  No  Do you have any needs or concerns that I can assist you with?:  No  Identified Barriers:  Lack of Education  Care Transitions Interventions                          Other Interventions:             Follow Up  Future Appointments   Date Time Provider Indiana Barber   2020 10:00 AM CESAR Reece CNP V WALK IN [facility]   3/9/2020  1:00 PM CESAR Reece CNP [facility] IN [address] [place] [address]   3/10/2020  3:00 PM Marven Severs, DO Neuro [facility]

## 2020-01-31 ENCOUNTER — OFFICE VISIT (OUTPATIENT)
Dept: PRIMARY CARE CLINIC | Age: 65
End: 2020-01-31
Payer: MEDICARE

## 2020-01-31 VITALS
WEIGHT: 192.4 LBS | OXYGEN SATURATION: 93 % | RESPIRATION RATE: 20 BRPM | SYSTOLIC BLOOD PRESSURE: 106 MMHG | DIASTOLIC BLOOD PRESSURE: 67 MMHG | BODY MASS INDEX: 28.41 KG/M2 | HEART RATE: 102 BPM | TEMPERATURE: 97.2 F

## 2020-01-31 PROCEDURE — 99496 TRANSJ CARE MGMT HIGH F2F 7D: CPT | Performed by: NURSE PRACTITIONER

## 2020-01-31 PROCEDURE — 1111F DSCHRG MED/CURRENT MED MERGE: CPT | Performed by: NURSE PRACTITIONER

## 2020-01-31 RX ORDER — MEMANTINE HYDROCHLORIDE 5 MG/1
5 TABLET ORAL DAILY
Qty: 30 TABLET | Refills: 1 | Status: SHIPPED | OUTPATIENT
Start: 2020-01-31 | End: 2020-05-04

## 2020-01-31 ASSESSMENT — ENCOUNTER SYMPTOMS
SHORTNESS OF BREATH: 0
VOMITING: 0
APNEA: 0
DIARRHEA: 0
PHOTOPHOBIA: 0
NAUSEA: 0
EYE REDNESS: 0
ABDOMINAL PAIN: 0
TROUBLE SWALLOWING: 0
EYE PAIN: 0
WHEEZING: 0
BLOOD IN STOOL: 0
CHEST TIGHTNESS: 0

## 2020-01-31 NOTE — PROGRESS NOTES
(TYLENOL EXTRA STRENGTH PO)  Take by mouth             aspirin 81 MG chewable tablet  Take 1 tablet by mouth daily             atorvastatin (LIPITOR) 80 MG tablet  Take 1 tablet by mouth nightly             Cyanocobalamin ER (RA VITAMIN B-12 TR) 1000 MCG TBCR  take 1 tablet by mouth once daily as directed             furosemide (LASIX) 20 MG tablet  Take 1 tablet by mouth daily             lisinopril (PRINIVIL;ZESTRIL) 5 MG tablet  Take 1 tablet by mouth daily             memantine (NAMENDA) 5 MG tablet  Take 1 tablet by mouth daily             metoprolol tartrate (LOPRESSOR) 25 MG tablet  Take 0.5 tablets by mouth 2 times daily             nicotine (NICODERM CQ) 21 MG/24HR  Place 1 patch onto the skin daily as needed (if patient smokes)             omeprazole (PRILOSEC) 20 MG delayed release capsule  Take 20 mg by mouth daily                   Medications marked \"taking\" at this time  Outpatient Medications Marked as Taking for the 1/31/20 encounter (Office Visit) with CESAR Mcallister CNP   Medication Sig Dispense Refill    memantine (NAMENDA) 5 MG tablet Take 1 tablet by mouth daily 30 tablet 1    atorvastatin (LIPITOR) 80 MG tablet Take 1 tablet by mouth nightly 30 tablet 3    furosemide (LASIX) 20 MG tablet Take 1 tablet by mouth daily 30 tablet 2    Acetaminophen (TYLENOL EXTRA STRENGTH PO) Take by mouth      metoprolol tartrate (LOPRESSOR) 25 MG tablet Take 0.5 tablets by mouth 2 times daily 60 tablet 5    lisinopril (PRINIVIL;ZESTRIL) 5 MG tablet Take 1 tablet by mouth daily 30 tablet 5    aspirin 81 MG chewable tablet Take 1 tablet by mouth daily 30 tablet 3    Cyanocobalamin ER (RA VITAMIN B-12 TR) 1000 MCG TBCR take 1 tablet by mouth once daily as directed 30 tablet 3    nicotine (NICODERM CQ) 21 MG/24HR Place 1 patch onto the skin daily as needed (if patient smokes) 30 patch 3    omeprazole (PRILOSEC) 20 MG delayed release capsule Take 20 mg by mouth daily          Medications patient taking as of now reconciled against medications ordered at time of hospital discharge: Yes    Chief Complaint   Patient presents with   [address] from [facility]. Vs 01/25/2020-01/27/2020 DX htn, enceph.  Memory Loss       Denies running out of any blood pressure medications  He is asking today about medication to help with his memory      Natividad Dalal was admitted to the hospital for the management of  Altered mentation. He presented with gradually progressing confusion and intermittent vision loss. Started about 2 pm today per girlfriend. No loss of consciousness, headache, seizure-like activity or incontinence noted. He was brought in to the ED, where he was able to answer all questions and move all his extremities to command. However, he became progressively worse, now unable to follow commands, speak or move. On arrival, his BP was 201/107. O2 saturation 98%, which have since dropped into the mid-high 80s    patient had CT head, CTA head neck performed and assessment by the stroke team which showed no acute neurologic deficits no significant stenosis or acute large vessel occlusion, stroke team is ordered an MRI and echocardiogram and carotid ultrasound further typical stroke work-up, there was found to be pre-existing unchanged evidence of infarcts in the occipital lobes similar to previous studies    Inpatient course: Discharge summary reviewed- see chart. Interval history/Current status: resolved      Review of Systems   Constitutional: Negative for appetite change, chills, diaphoresis, fatigue, fever and unexpected weight change. HENT: Negative for hearing loss and trouble swallowing. Eyes: Negative for photophobia, pain, redness and visual disturbance. Respiratory: Negative for apnea, chest tightness, shortness of breath and wheezing. Snoring   Cardiovascular: Negative for chest pain and palpitations.    Gastrointestinal: Negative for abdominal pain, blood in stool, diarrhea, nausea Head: Normocephalic and atraumatic. Mouth/Throat:      Mouth: Mucous membranes are moist.   Eyes:      General:         Right eye: No discharge. Left eye: No discharge. Pupils: Pupils are equal, round, and reactive to light. Neck:      Musculoskeletal: Neck supple. Cardiovascular:      Rate and Rhythm: Normal rate and regular rhythm. Heart sounds: No murmur. Pulmonary:      Effort: Pulmonary effort is normal.      Breath sounds: Normal breath sounds. Abdominal:      General: Bowel sounds are normal.      Palpations: Abdomen is soft. Musculoskeletal:      Comments: 5/5 BUE  strength, equal push/pulls   Skin:     General: Skin is warm and dry. Neurological:      Mental Status: He is alert and oriented to person, place, and time. Cranial Nerves: No cranial nerve deficit. Motor: No tremor. Coordination: Coordination normal.   Psychiatric:         Behavior: Behavior normal.      Comments: Lack of insight into his health             Assessment/Plan:  1. Encephalopathy, hypertensive  Blood pressure at goal today. Denies any need for refills. Taking medication daily. - Ammonia; Future    2. Memory loss  B12 low previously, but now WNL. No improvement in mentation. Agreed to start [address]. ADR's discussed with patient and girlfriend. Keep Neurology appointment in March    3. Vitamin B 12 deficiency  Again advised to follow up with Heme/Onc for further evaluation of this.          Medical Decision Making: high complexity

## 2020-02-04 ENCOUNTER — CARE COORDINATION (OUTPATIENT)
Dept: CASE MANAGEMENT | Age: 65
End: 2020-02-04

## 2020-02-04 NOTE — CARE COORDINATION
[facility] Transitions Follow Up Call    2020    Patient: Emmy Denton  Patient : 1955   MRN: [id number]  Reason for Admission: Hypertensive encephalopathy   Discharge Date: 20 RARS: Readmission Risk Score: 12         Spoke with: Alden SO    Spoke with patient's SO, Ty who states patient has been doing well. He is feeling good and having no issues with his blood pressures. He was seen by his PCP yesterday, has new script for Namenda which he had filled yesterday. Ty reports no problems or concerns, denies any needs at this time. Care Transitions Subsequent and Final Call    Schedule Follow Up Appointment with PCP:  Completed  Subsequent and Final Calls  Do you have any ongoing symptoms?:  No  Have your medications changed?:  Yes  Do you have any questions related to your medications?:  No  Do you currently have any active services?:  No  Do you have any needs or concerns that I can assist you with?:  No  Identified Barriers:  Lack of Education  Care Transitions Interventions                          Other Interventions:             Follow Up  Future Appointments   Date Time Provider [place] Barber   3/9/2020  1:00 PM CESAR Graham - CNP [facility]   3/10/2020  3:00 PM DO Renuka Armando [facility]

## 2020-02-07 ENCOUNTER — CARE COORDINATION (OUTPATIENT)
Dept: CASE MANAGEMENT | Age: 65
End: 2020-02-07

## 2020-02-07 NOTE — CARE COORDINATION
[facility] Transitions Final Call    2020    Patient: Geraldine Wharton  Patient : 1955   MRN: [id number]  Reason for Admission: Hypertensive encephalopathy   Discharge Date: 20 RARS: Readmission Risk Score: 12         Spoke with: Ty, patient's girlfriend    Spoke with patient's girlfriend who states that he is doing well and having no new issues. She denies he has any problems with high blood pressures that are uncontrolled and has been taking his medications as prescribed. Denies any needs or concerns at this time. Expressed th is was final call, episode resolved. Care Transitions Subsequent and Final Call    Schedule Follow Up Appointment with PCP:  Completed  Subsequent and Final Calls  Do you have any ongoing symptoms?:  No  Have your medications changed?:  No  Do you have any questions related to your medications?:  No  Do you currently have any active services?:  No  Do you have any needs or concerns that I can assist you with?:  No  Identified Barriers:  Lack of Education  Care Transitions Interventions                          Other Interventions:             Follow Up  Future Appointments   Date Time Provider Indiana Barber   3/9/2020  1:00 PM CESAR Herrmann CNP [facility]   3/10/2020  3:00 PM DO Renuka Saenz[address]

## 2020-02-08 NOTE — DISCHARGE SUMMARY
nightly, Disp-30 tablet, R-3Normal         CONTINUE these medications which have NOT CHANGED    Details   Acetaminophen (TYLENOL EXTRA STRENGTH PO) Take by mouthHistorical Med      metoprolol tartrate (LOPRESSOR) 25 MG tablet Take 0.5 tablets by mouth 2 times daily, Disp-60 tablet, R-5Normal      lisinopril (PRINIVIL;ZESTRIL) 5 MG tablet Take 1 tablet by mouth daily, Disp-30 tablet, R-5Normal      aspirin 81 MG chewable tablet Take 1 tablet by mouth daily, Disp-30 tablet, R-3Normal      Cyanocobalamin ER (RA VITAMIN B-12 TR) 1000 MCG TBCR take 1 tablet by mouth once daily as directed, Disp-30 tablet, R-3Normal      nicotine (NICODERM CQ) 21 MG/24HR Place 1 patch onto the skin daily as needed (if patient smokes), Disp-30 patch, R-3Normal      omeprazole (PRILOSEC) 20 MG delayed release capsule Take 20 mg by mouth dailyHistorical Med      magnesium oxide (MAG-OX) 400 (241.3 Mg) MG TABS tablet take 1 tablet by mouth once daily, Disp-30 tablet, R-3Normal             Activity: activity as tolerated    Diet: cardiac diet    Follow-up:    Dee Dee Rodríguez, APRN - CNP  [address] [place] [address] Rd  [address]  [phone]    On 1/31/2020  10am  please arrive 15 minutes, bring photo ID, insurance card, list of current meds. call 24 hours prior to appointment if needing to cancel or reschedule. Patient Instructions: ENSURE COMPLIANCE WITH MEDICATIONS  FOLLOW UP WITH pcp  START LASIX 20MG DAILY  Follow up labs: None  Follow up imaging: None    Note that over 30 minutes was spent in preparing discharge papers, discussing discharge with patient, medication review, etc.      Laura Ram MD, MD  Internal Medicine Resident, PGY-1  [address], [place]  2/8/2020, 4:29 PM

## 2020-05-04 RX ORDER — MEMANTINE HYDROCHLORIDE 5 MG/1
TABLET ORAL
Qty: 30 TABLET | Refills: 1 | Status: SHIPPED | OUTPATIENT
Start: 2020-05-04 | End: 2020-07-22 | Stop reason: SDUPTHER

## 2020-05-04 NOTE — TELEPHONE ENCOUNTER
Health Maintenance   Topic Date Due    AAA screen  1955    Hepatitis C screen  1955    Low dose CT lung screening  04/04/2009    Colon cancer screen colonoscopy  03/10/2019    Pneumococcal 65+ years Vaccine (1 of 1 - PPSV23) 04/04/2019    Annual Wellness Visit (AWV)  06/23/2019    Shingles Vaccine (1 of 2) 08/30/2020 (Originally 4/4/2004)    Flu vaccine (Season Ended) 01/06/2021 (Originally 9/1/2020)    A1C test (Diabetic or Prediabetic)  01/26/2021    Lipid screen  01/26/2021    Potassium monitoring  01/27/2021    Creatinine monitoring  01/27/2021    DTaP/Tdap/Td vaccine (2 - Td) 12/31/2028    Hepatitis A vaccine  Aged Out    Hepatitis B vaccine  Aged Out    Hib vaccine  Aged Out    Meningococcal (ACWY) vaccine  Aged Out             (applicable per patient's age: Cancer Screenings, Depression Screening, Fall Risk Screening, Immunizations)    Hemoglobin A1C (%)   Date Value   01/26/2020 6.3 (H)   01/24/2019 6.0   12/08/2016 5.2     LDL Cholesterol (mg/dL)   Date Value   01/26/2020 132 (H)     AST (U/L)   Date Value   01/25/2020 18     ALT (U/L)   Date Value   01/25/2020 18     BUN (mg/dL)   Date Value   01/27/2020 18      (goal A1C is < 7)   (goal LDL is <100) need 30-50% reduction from baseline     BP Readings from Last 3 Encounters:   01/31/20 106/67   01/27/20 (!) 151/65   01/06/20 (!) 143/80    (goal /80)      All Future Testing planned in [facility]:  Lab Frequency Next Occurrence   Ferritin Once 07/16/2020   Vitamin B12 & Folate Once 07/16/2020   Comprehensive Metabolic Panel, Fasting Once 07/16/2020   CT Lung Screen (Annual) Once 02/28/2021   Ammonia Once 05/09/2020       Next Visit Date:  No future appointments.          Patient Active Problem List:     Hyperlipidemia     GERD (gastroesophageal reflux disease)     Sigmoid polyp     Gastric ulcer     Mild malnutrition ([address]     Tobacco use     Colon polyp     Essential hypertension     Coronary artery disease involving native

## 2020-07-08 RX ORDER — ASPIRIN 81 MG
TABLET,CHEWABLE ORAL
Qty: 30 TABLET | Refills: 3 | Status: SHIPPED | OUTPATIENT
Start: 2020-07-08 | End: 2021-04-09

## 2020-07-08 NOTE — TELEPHONE ENCOUNTER
Health Maintenance   Topic Date Due    AAA screen  1955    Hepatitis C screen  1955    Low dose CT lung screening  04/04/2009    Colon cancer screen colonoscopy  03/10/2019    Pneumococcal 65+ years Vaccine (1 of 1 - PPSV23) 04/04/2019    Annual Wellness Visit (AWV)  06/23/2019    Shingles Vaccine (1 of 2) 08/30/2020 (Originally 4/4/2004)    Flu vaccine (1) 09/01/2020    A1C test (Diabetic or Prediabetic)  01/26/2021    Lipid screen  01/26/2021    Potassium monitoring  01/27/2021    Creatinine monitoring  01/27/2021    DTaP/Tdap/Td vaccine (2 - Td) 12/31/2028    Hepatitis A vaccine  Aged Out    Hepatitis B vaccine  Aged Out    Hib vaccine  Aged Out    Meningococcal (ACWY) vaccine  Aged Out             (applicable per patient's age: Cancer Screenings, Depression Screening, Fall Risk Screening, Immunizations)    Hemoglobin A1C (%)   Date Value   01/26/2020 6.3 (H)   01/24/2019 6.0   12/08/2016 5.2     LDL Cholesterol (mg/dL)   Date Value   01/26/2020 132 (H)     AST (U/L)   Date Value   01/25/2020 18     ALT (U/L)   Date Value   01/25/2020 18     BUN (mg/dL)   Date Value   01/27/2020 18      (goal A1C is < 7)   (goal LDL is <100) need 30-50% reduction from baseline     BP Readings from Last 3 Encounters:   01/31/20 106/67   01/27/20 (!) 151/65   01/06/20 (!) 143/80    (goal /80)      All Future Testing planned in [facility]:  Lab Frequency Next Occurrence   Ferritin Once 07/16/2020   Vitamin B12 & Folate Once 07/16/2020   Comprehensive Metabolic Panel, Fasting Once 07/16/2020   CT Lung Screen (Annual) Once 02/28/2021   Ammonia Once 05/09/2020       Next Visit Date:  No future appointments.          Patient Active Problem List:     Hyperlipidemia     GERD (gastroesophageal reflux disease)     Sigmoid polyp     Gastric ulcer     Mild malnutrition ([address]     Tobacco use     Colon polyp     Essential hypertension     Coronary artery disease involving native coronary artery of native heart without angina pectoris     Benign essential HTN     AVM (arteriovenous malformation) of duodenum, acquired     Adenomatous colon polyp     PUD (peptic ulcer disease)     Diverticulosis of large intestine without hemorrhage     Arthritis of knee     Multi-infarct state     Left homonymous hemianopsia     Occipital infarction (HCC)     Prolonged Q-T interval on ECG     Memory loss of unknown cause     Visual disturbance     H/O: CVA (cerebrovascular accident)     Cerebellar infarct ([address]     Left temporal lobe infarction (HCC)     Cataracts, bilateral     Internal carotid artery stenosis, right: 50 to 69%     Hypertensive encephalopathy     Chronic systolic CHF (congestive heart failure) (HCC)     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

## 2020-07-09 ENCOUNTER — HOSPITAL ENCOUNTER (INPATIENT)
Age: 65
LOS: 3 days | Discharge: HOME | DRG: 683 | End: 2020-07-12
Attending: EMERGENCY MEDICINE | Admitting: INTERNAL MEDICINE
Payer: MEDICARE

## 2020-07-09 ENCOUNTER — APPOINTMENT (OUTPATIENT)
Dept: GENERAL RADIOLOGY | Age: 65
DRG: 683 | End: 2020-07-09
Payer: MEDICARE

## 2020-07-09 PROBLEM — N17.9 ACUTE RENAL FAILURE (ARF) (HCC): Status: ACTIVE | Noted: 2020-07-09

## 2020-07-09 LAB
-: ABNORMAL
-: NORMAL
-: NORMAL
ABSOLUTE EOS #: 0.05 K/UL (ref 0–0.44)
ABSOLUTE IMMATURE GRANULOCYTE: 0.03 K/UL (ref 0–0.3)
ABSOLUTE LYMPH #: 1.18 K/UL (ref 1.1–3.7)
ABSOLUTE MONO #: 0.86 K/UL (ref 0.1–1.2)
ALBUMIN SERPL-MCNC: 4 G/DL (ref 3.5–5.2)
ALBUMIN/GLOBULIN RATIO: 0.9 (ref 1–2.5)
ALLEN TEST: ABNORMAL
ALP BLD-CCNC: 95 U/L (ref 40–129)
ALT SERPL-CCNC: 10 U/L (ref 5–41)
AMORPHOUS: ABNORMAL
ANION GAP SERPL CALCULATED.3IONS-SCNC: 28 MMOL/L (ref 9–17)
ANION GAP: 11 MMOL/L (ref 7–16)
AST SERPL-CCNC: 17 U/L
BACTERIA: ABNORMAL
BASOPHILS # BLD: 0 % (ref 0–2)
BASOPHILS ABSOLUTE: 0.03 K/UL (ref 0–0.2)
BILIRUB SERPL-MCNC: 0.68 MG/DL (ref 0.3–1.2)
BILIRUBIN URINE: ABNORMAL
BUN BLDV-MCNC: 78 MG/DL (ref 8–23)
BUN/CREAT BLD: ABNORMAL (ref 9–20)
CALCIUM SERPL-MCNC: 8.4 MG/DL (ref 8.6–10.4)
CASTS UA: ABNORMAL /LPF (ref 0–2)
CASTS UA: ABNORMAL /LPF (ref 0–2)
CHLORIDE BLD-SCNC: 92 MMOL/L (ref 98–107)
CHLORIDE, UR: <20 MMOL/L
CO2: 20 MMOL/L (ref 20–31)
COLOR: ABNORMAL
COMMENT UA: ABNORMAL
CREAT SERPL-MCNC: 13.73 MG/DL (ref 0.7–1.2)
CREATININE URINE: 726.8 MG/DL (ref 39–259)
CRYSTALS, UA: ABNORMAL /HPF
DIFFERENTIAL TYPE: ABNORMAL
EOSINOPHILS RELATIVE PERCENT: 1 % (ref 1–4)
EPITHELIAL CELLS UA: ABNORMAL /HPF (ref 0–5)
FIO2: ABNORMAL
GFR AFRICAN AMERICAN: 4 ML/MIN
GFR NON-AFRICAN AMERICAN: 4 ML/MIN
GFR NON-AFRICAN AMERICAN: 4 ML/MIN
GFR SERPL CREATININE-BSD FRML MDRD: 5 ML/MIN
GFR SERPL CREATININE-BSD FRML MDRD: ABNORMAL ML/MIN/{1.73_M2}
GLUCOSE BLD-MCNC: 113 MG/DL (ref 74–100)
GLUCOSE BLD-MCNC: 120 MG/DL (ref 70–99)
GLUCOSE URINE: NEGATIVE
HCO3 VENOUS: 22.1 MMOL/L (ref 22–29)
HCT VFR BLD CALC: 44.6 % (ref 40.7–50.3)
HEMOGLOBIN: 15.3 G/DL (ref 13–17)
IMMATURE GRANULOCYTES: 0 %
INR BLD: 1
KETONES, URINE: ABNORMAL
LACTIC ACID, WHOLE BLOOD: 1.7 MMOL/L (ref 0.7–2.1)
LEUKOCYTE ESTERASE, URINE: ABNORMAL
LIPASE: 22 U/L (ref 13–60)
LYMPHOCYTES # BLD: 12 % (ref 24–43)
MCH RBC QN AUTO: 30.5 PG (ref 25.2–33.5)
MCHC RBC AUTO-ENTMCNC: 34.3 G/DL (ref 28.4–34.8)
MCV RBC AUTO: 89 FL (ref 82.6–102.9)
MODE: ABNORMAL
MONOCYTES # BLD: 9 % (ref 3–12)
MUCUS: ABNORMAL
MYOGLOBIN: 1956 NG/ML (ref 28–72)
NEGATIVE BASE EXCESS, VEN: 2 (ref 0–2)
NITRITE, URINE: NEGATIVE
NRBC AUTOMATED: 0 PER 100 WBC
O2 DEVICE/FLOW/%: ABNORMAL
O2 SAT, VEN: 78 % (ref 60–85)
OTHER OBSERVATIONS UA: ABNORMAL
PATIENT TEMP: ABNORMAL
PCO2, VEN: 35.2 MM HG (ref 41–51)
PDW BLD-RTO: 14.7 % (ref 11.8–14.4)
PH UA: 5 (ref 5–8)
PH VENOUS: 7.41 (ref 7.32–7.43)
PLATELET # BLD: 259 K/UL (ref 138–453)
PLATELET ESTIMATE: ABNORMAL
PMV BLD AUTO: 9.8 FL (ref 8.1–13.5)
PO2, VEN: 41.5 MM HG (ref 30–50)
POC CHLORIDE: 102 MMOL/L (ref 98–107)
POC CREATININE: 13.02 MG/DL (ref 0.51–1.19)
POC HEMATOCRIT: 47 % (ref 41–53)
POC HEMOGLOBIN: 16 G/DL (ref 13.5–17.5)
POC IONIZED CALCIUM: 0.84 MMOL/L (ref 1.15–1.33)
POC LACTIC ACID: 1.12 MMOL/L (ref 0.56–1.39)
POC PCO2 TEMP: ABNORMAL MM HG
POC PH TEMP: ABNORMAL
POC PO2 TEMP: ABNORMAL MM HG
POC POTASSIUM: 3.5 MMOL/L (ref 3.5–4.5)
POC SODIUM: 135 MMOL/L (ref 138–146)
POSITIVE BASE EXCESS, VEN: ABNORMAL (ref 0–3)
POTASSIUM SERPL-SCNC: 3.5 MMOL/L (ref 3.7–5.3)
POTASSIUM SERPL-SCNC: 3.6 MMOL/L (ref 3.7–5.3)
POTASSIUM, UR: 32.4 MMOL/L
PROTEIN UA: ABNORMAL
PROTHROMBIN TIME: 10.6 SEC (ref 9–12)
RBC # BLD: 5.01 M/UL (ref 4.21–5.77)
RBC # BLD: ABNORMAL 10*6/UL
RBC UA: ABNORMAL /HPF (ref 0–2)
REASON FOR REJECTION: NORMAL
REASON FOR REJECTION: NORMAL
RENAL EPITHELIAL, UA: ABNORMAL /HPF
SAMPLE SITE: ABNORMAL
SEG NEUTROPHILS: 78 % (ref 36–65)
SEGMENTED NEUTROPHILS ABSOLUTE COUNT: 7.41 K/UL (ref 1.5–8.1)
SODIUM BLD-SCNC: 140 MMOL/L (ref 135–144)
SODIUM,UR: <20 MMOL/L
SPECIFIC GRAVITY UA: 1.03 (ref 1–1.03)
TOTAL CK: 415 U/L (ref 39–308)
TOTAL CO2, VENOUS: 23 MMOL/L (ref 23–30)
TOTAL PROTEIN, URINE: 95 MG/DL
TOTAL PROTEIN: 8.3 G/DL (ref 6.4–8.3)
TRICHOMONAS: ABNORMAL
TROPONIN INTERP: ABNORMAL
TROPONIN INTERP: ABNORMAL
TROPONIN T: ABNORMAL NG/ML
TROPONIN T: ABNORMAL NG/ML
TROPONIN, HIGH SENSITIVITY: 36 NG/L (ref 0–22)
TROPONIN, HIGH SENSITIVITY: 46 NG/L (ref 0–22)
TSH SERPL DL<=0.05 MIU/L-ACNC: 0.42 MIU/L (ref 0.3–5)
TURBIDITY: ABNORMAL
URINE HGB: NEGATIVE
URINE TOTAL PROTEIN CREATININE RATIO: 0.13 (ref 0–0.2)
UROBILINOGEN, URINE: NORMAL
WBC # BLD: 9.6 K/UL (ref 3.5–11.3)
WBC # BLD: ABNORMAL 10*3/UL
WBC UA: ABNORMAL /HPF (ref 0–5)
YEAST: ABNORMAL
ZZ NTE CLEAN UP: ORDERED TEST: NORMAL
ZZ NTE CLEAN UP: ORDERED TEST: NORMAL
ZZ NTE WITH NAME CLEAN UP: SPECIMEN SOURCE: NORMAL
ZZ NTE WITH NAME CLEAN UP: SPECIMEN SOURCE: NORMAL

## 2020-07-09 PROCEDURE — 6370000000 HC RX 637 (ALT 250 FOR IP): Performed by: STUDENT IN AN ORGANIZED HEALTH CARE EDUCATION/TRAINING PROGRAM

## 2020-07-09 PROCEDURE — 82570 ASSAY OF URINE CREATININE: CPT

## 2020-07-09 PROCEDURE — 82803 BLOOD GASES ANY COMBINATION: CPT

## 2020-07-09 PROCEDURE — 84132 ASSAY OF SERUM POTASSIUM: CPT

## 2020-07-09 PROCEDURE — 84443 ASSAY THYROID STIM HORMONE: CPT

## 2020-07-09 PROCEDURE — 84133 ASSAY OF URINE POTASSIUM: CPT

## 2020-07-09 PROCEDURE — 99284 EMERGENCY DEPT VISIT MOD MDM: CPT

## 2020-07-09 PROCEDURE — 80053 COMPREHEN METABOLIC PANEL: CPT

## 2020-07-09 PROCEDURE — 82436 ASSAY OF URINE CHLORIDE: CPT

## 2020-07-09 PROCEDURE — 2060000000 HC ICU INTERMEDIATE R&B

## 2020-07-09 PROCEDURE — 82565 ASSAY OF CREATININE: CPT

## 2020-07-09 PROCEDURE — 83690 ASSAY OF LIPASE: CPT

## 2020-07-09 PROCEDURE — 71045 X-RAY EXAM CHEST 1 VIEW: CPT

## 2020-07-09 PROCEDURE — 2580000003 HC RX 258: Performed by: STUDENT IN AN ORGANIZED HEALTH CARE EDUCATION/TRAINING PROGRAM

## 2020-07-09 PROCEDURE — 82947 ASSAY GLUCOSE BLOOD QUANT: CPT

## 2020-07-09 PROCEDURE — 93005 ELECTROCARDIOGRAM TRACING: CPT | Performed by: EMERGENCY MEDICINE

## 2020-07-09 PROCEDURE — 2580000003 HC RX 258: Performed by: EMERGENCY MEDICINE

## 2020-07-09 PROCEDURE — 82435 ASSAY OF BLOOD CHLORIDE: CPT

## 2020-07-09 PROCEDURE — 84300 ASSAY OF URINE SODIUM: CPT

## 2020-07-09 PROCEDURE — 82330 ASSAY OF CALCIUM: CPT

## 2020-07-09 PROCEDURE — 85610 PROTHROMBIN TIME: CPT

## 2020-07-09 PROCEDURE — 84156 ASSAY OF PROTEIN URINE: CPT

## 2020-07-09 PROCEDURE — 81001 URINALYSIS AUTO W/SCOPE: CPT

## 2020-07-09 PROCEDURE — 96361 HYDRATE IV INFUSION ADD-ON: CPT

## 2020-07-09 PROCEDURE — 85014 HEMATOCRIT: CPT

## 2020-07-09 PROCEDURE — 82550 ASSAY OF CK (CPK): CPT

## 2020-07-09 PROCEDURE — 6360000002 HC RX W HCPCS: Performed by: STUDENT IN AN ORGANIZED HEALTH CARE EDUCATION/TRAINING PROGRAM

## 2020-07-09 PROCEDURE — 85025 COMPLETE CBC W/AUTO DIFF WBC: CPT

## 2020-07-09 PROCEDURE — 87086 URINE CULTURE/COLONY COUNT: CPT

## 2020-07-09 PROCEDURE — 83605 ASSAY OF LACTIC ACID: CPT

## 2020-07-09 PROCEDURE — 83874 ASSAY OF MYOGLOBIN: CPT

## 2020-07-09 PROCEDURE — 84484 ASSAY OF TROPONIN QUANT: CPT

## 2020-07-09 PROCEDURE — 84295 ASSAY OF SERUM SODIUM: CPT

## 2020-07-09 PROCEDURE — 96374 THER/PROPH/DIAG INJ IV PUSH: CPT

## 2020-07-09 PROCEDURE — 87040 BLOOD CULTURE FOR BACTERIA: CPT

## 2020-07-09 RX ORDER — 0.9 % SODIUM CHLORIDE 0.9 %
1000 INTRAVENOUS SOLUTION INTRAVENOUS ONCE
Status: COMPLETED | OUTPATIENT
Start: 2020-07-09 | End: 2020-07-09

## 2020-07-09 RX ORDER — SODIUM CHLORIDE 9 MG/ML
INJECTION, SOLUTION INTRAVENOUS CONTINUOUS
Status: DISCONTINUED | OUTPATIENT
Start: 2020-07-09 | End: 2020-07-11

## 2020-07-09 RX ORDER — ONDANSETRON 2 MG/ML
4 INJECTION INTRAMUSCULAR; INTRAVENOUS EVERY 6 HOURS PRN
Status: DISCONTINUED | OUTPATIENT
Start: 2020-07-09 | End: 2020-07-12 | Stop reason: HOSPADM

## 2020-07-09 RX ORDER — ASPIRIN 81 MG/1
81 TABLET, CHEWABLE ORAL ONCE
Status: COMPLETED | OUTPATIENT
Start: 2020-07-09 | End: 2020-07-09

## 2020-07-09 RX ORDER — NICOTINE 21 MG/24HR
1 PATCH, TRANSDERMAL 24 HOURS TRANSDERMAL DAILY
Status: DISCONTINUED | OUTPATIENT
Start: 2020-07-10 | End: 2020-07-12 | Stop reason: HOSPADM

## 2020-07-09 RX ORDER — ACETAMINOPHEN 650 MG/1
650 SUPPOSITORY RECTAL EVERY 6 HOURS PRN
Status: DISCONTINUED | OUTPATIENT
Start: 2020-07-09 | End: 2020-07-12 | Stop reason: HOSPADM

## 2020-07-09 RX ORDER — ONDANSETRON 2 MG/ML
4 INJECTION INTRAMUSCULAR; INTRAVENOUS ONCE
Status: COMPLETED | OUTPATIENT
Start: 2020-07-09 | End: 2020-07-09

## 2020-07-09 RX ORDER — MAGNESIUM OXIDE 400 MG/1
400 TABLET ORAL DAILY
COMMUNITY

## 2020-07-09 RX ORDER — SODIUM CHLORIDE 0.9 % (FLUSH) 0.9 %
10 SYRINGE (ML) INJECTION PRN
Status: DISCONTINUED | OUTPATIENT
Start: 2020-07-09 | End: 2020-07-12 | Stop reason: HOSPADM

## 2020-07-09 RX ORDER — HEPARIN SODIUM 5000 [USP'U]/ML
5000 INJECTION, SOLUTION INTRAVENOUS; SUBCUTANEOUS EVERY 8 HOURS SCHEDULED
Status: DISCONTINUED | OUTPATIENT
Start: 2020-07-09 | End: 2020-07-12 | Stop reason: HOSPADM

## 2020-07-09 RX ORDER — ACETAMINOPHEN 325 MG/1
650 TABLET ORAL EVERY 6 HOURS PRN
Status: DISCONTINUED | OUTPATIENT
Start: 2020-07-09 | End: 2020-07-12 | Stop reason: HOSPADM

## 2020-07-09 RX ORDER — NOREPINEPHRINE BIT/0.9 % NACL 16MG/250ML
2 INFUSION BOTTLE (ML) INTRAVENOUS CONTINUOUS
Status: DISCONTINUED | OUTPATIENT
Start: 2020-07-09 | End: 2020-07-09

## 2020-07-09 RX ORDER — SODIUM CHLORIDE 0.9 % (FLUSH) 0.9 %
10 SYRINGE (ML) INJECTION EVERY 12 HOURS SCHEDULED
Status: DISCONTINUED | OUTPATIENT
Start: 2020-07-09 | End: 2020-07-12 | Stop reason: HOSPADM

## 2020-07-09 RX ORDER — ATORVASTATIN CALCIUM 80 MG/1
80 TABLET, FILM COATED ORAL NIGHTLY
Status: DISCONTINUED | OUTPATIENT
Start: 2020-07-09 | End: 2020-07-12 | Stop reason: HOSPADM

## 2020-07-09 RX ORDER — POLYETHYLENE GLYCOL 3350 17 G/17G
17 POWDER, FOR SOLUTION ORAL DAILY PRN
Status: DISCONTINUED | OUTPATIENT
Start: 2020-07-09 | End: 2020-07-12 | Stop reason: HOSPADM

## 2020-07-09 RX ORDER — PROMETHAZINE HYDROCHLORIDE 25 MG/1
12.5 TABLET ORAL EVERY 6 HOURS PRN
Status: DISCONTINUED | OUTPATIENT
Start: 2020-07-09 | End: 2020-07-12 | Stop reason: HOSPADM

## 2020-07-09 RX ADMIN — SODIUM CHLORIDE, PRESERVATIVE FREE 10 ML: 5 INJECTION INTRAVENOUS at 22:48

## 2020-07-09 RX ADMIN — CEFTRIAXONE SODIUM 1 G: 1 INJECTION, POWDER, FOR SOLUTION INTRAMUSCULAR; INTRAVENOUS at 18:21

## 2020-07-09 RX ADMIN — ONDANSETRON 4 MG: 2 INJECTION, SOLUTION INTRAMUSCULAR; INTRAVENOUS at 14:46

## 2020-07-09 RX ADMIN — SODIUM CHLORIDE: 9 INJECTION, SOLUTION INTRAVENOUS at 18:25

## 2020-07-09 RX ADMIN — ATORVASTATIN CALCIUM 80 MG: 80 TABLET, FILM COATED ORAL at 23:08

## 2020-07-09 RX ADMIN — SODIUM CHLORIDE 1000 ML: 9 INJECTION, SOLUTION INTRAVENOUS at 14:40

## 2020-07-09 RX ADMIN — SODIUM CHLORIDE: 9 INJECTION, SOLUTION INTRAVENOUS at 22:48

## 2020-07-09 RX ADMIN — SODIUM CHLORIDE 1000 ML: 9 INJECTION, SOLUTION INTRAVENOUS at 14:30

## 2020-07-09 RX ADMIN — ASPIRIN 81 MG 81 MG: 81 TABLET ORAL at 23:07

## 2020-07-09 RX ADMIN — HEPARIN SODIUM 5000 UNITS: 5000 INJECTION INTRAVENOUS; SUBCUTANEOUS at 23:08

## 2020-07-09 RX ADMIN — SODIUM CHLORIDE 1000 ML: 9 INJECTION, SOLUTION INTRAVENOUS at 15:46

## 2020-07-09 ASSESSMENT — ENCOUNTER SYMPTOMS
TROUBLE SWALLOWING: 0
RHINORRHEA: 0
VOMITING: 1
CONSTIPATION: 0
CHEST TIGHTNESS: 0
ABDOMINAL DISTENTION: 0
BACK PAIN: 0
COUGH: 0
SORE THROAT: 0
SHORTNESS OF BREATH: 0
ABDOMINAL PAIN: 0
NAUSEA: 1
WHEEZING: 0
DIARRHEA: 0

## 2020-07-09 NOTE — ED NOTES
Writer attempted second set of blood cultures one time and Gildardo SWANSON attempted two times with no success. Spoke with Santosh Wong from phlebotomy, who will come try to get the second set of blood cultures.      Simin Trejo RN  07/09/20 [id number]

## 2020-07-09 NOTE — ED NOTES
Pt skin w/d, resp easy, given warm blankets for comfort, denies needs at present time, will continue to monitor, call light in reach     Denis Presana, SKY  07/09/20 1929

## 2020-07-09 NOTE — ED PROVIDER NOTES
[facility] HANDOFF       Handoff taken on the following patient from prior Attending Physician:  Pt Name: Nathaly Michelle  PCP:  CESAR Carnes CNP    Attestation  I was available and discussed any additional care issues that arose and coordinated the management plans with the resident(s) caring for the patient during my duty period. Any areas of disagreement with resident's documentation of care or procedures are noted on the chart. I was personally present for the key portions of any/all procedures during my duty period. I have documented in the chart those procedures where I was not present during the key portions.          [address] [facility]       Chief Complaint   Patient presents with    Fatigue         CURRENT MEDICATIONS     Previous Medications  Previous Medications    ACETAMINOPHEN (TYLENOL EXTRA STRENGTH PO)    Take by mouth    ATORVASTATIN (LIPITOR) 80 MG TABLET    Take 1 tablet by mouth nightly    CYANOCOBALAMIN ER (RA VITAMIN B-12 TR) 1000 MCG TBCR    take 1 tablet by mouth once daily as directed    FUROSEMIDE (LASIX) 20 MG TABLET    Take 1 tablet by mouth daily    LISINOPRIL (PRINIVIL;ZESTRIL) 5 MG TABLET    Take 1 tablet by mouth daily    MAGNESIUM OXIDE (MAG-OX) 400 MG TABLET    Take 400 mg by mouth daily    MEMANTINE (NAMENDA) 5 MG TABLET    take 1 tablet by mouth once daily    METOPROLOL TARTRATE (LOPRESSOR) 25 MG TABLET    Take 0.5 tablets by mouth 2 times daily    NICOTINE (NICODERM CQ) 21 MG/24HR    Place 1 patch onto the skin daily as needed (if patient smokes)    OMEPRAZOLE (PRILOSEC) 20 MG DELAYED RELEASE CAPSULE    Take 20 mg by mouth daily    RA ASPIRIN ADULT LOW DOSE 81 MG CHEWABLE TABLET    chew and swallow 1 tablet by mouth once daily       Encounter Medications  Orders Placed This Encounter   Medications    0.9 % sodium chloride bolus    ondansetron (ZOFRAN) injection 4 mg    0.9 % sodium chloride bolus    DISCONTD: norepinephrine (LEVOPHED) 16 mg in sodium chloride 0.9 % 250 mL infusion    cefTRIAXone (ROCEPHIN) 1 g IVPB in 50 mL D5W minibag    0.9 % sodium chloride bolus    sodium chloride 0.9 % 1,000 mL infusion       ALLERGIES     has No Known Allergies. RECENT VITALS:   Temp: 98.1 °F (36.7 °C),  Pulse: 64,  , BP: (!) 93/45    RADIOLOGY:   XR CHEST PORTABLE   Final Result   No evidence of acute cardiopulmonary process. LABS:  Labs Reviewed   COMPREHENSIVE METABOLIC PANEL - Abnormal; Notable for the following components:       Result Value    Glucose 120 (*)     BUN 78 (*)     CREATININE 13.73 (*)     Calcium 8.4 (*)     Potassium 3.5 (*)     Chloride 92 (*)     Anion Gap 28 (*)     Albumin/Globulin Ratio 0.9 (*)     GFR Non- 4 (*)     GFR  4 (*)     All other components within normal limits   CBC WITH AUTO DIFFERENTIAL - Abnormal; Notable for the following components:    RDW 14.7 (*)     Seg Neutrophils 78 (*)     Lymphocytes 12 (*)     All other components within normal limits   TROPONIN - Abnormal; Notable for the following components:    Troponin, High Sensitivity 46 (*)     All other components within normal limits   URINALYSIS - Abnormal; Notable for the following components:    Color, UA DARK YELLOW (*)     Turbidity UA CLOUDY (*)     Bilirubin Urine NEGATIVE  Verified by ictotest. (*)     Ketones, Urine TRACE (*)     Protein, UA TRACE (*)     Leukocyte Esterase, Urine TRACE (*)     All other components within normal limits   CK - Abnormal; Notable for the following components:     Total  (*)     All other components within normal limits   MYOGLOBIN, SERUM - Abnormal; Notable for the following components:    Myoglobin 1,956 (*)     All other components within normal limits   SODIUM (POC) - Abnormal; Notable for the following components:    POC Sodium 135 (*)     All other components within normal limits   CALCIUM, IONIC (POC) - Abnormal; Notable for the following components:    POC Ionized Calcium 0.84 (*)     All other components within normal limits   MICROSCOPIC URINALYSIS - Abnormal; Notable for the following components:    Bacteria, UA FEW (*)     Mucus, UA 1+ (*)     All other components within normal limits   POTASSIUM - Abnormal; Notable for the following components:    Potassium 3.6 (*)     All other components within normal limits   TROPONIN - Abnormal; Notable for the following components:    Troponin, High Sensitivity 36 (*)     All other components within normal limits   VENOUS BLOOD GAS, POINT OF CARE - Abnormal; Notable for the following components:    pCO2, Tino 35.2 (*)     All other components within normal limits   CREATININE W/GFR POINT OF CARE - Abnormal; Notable for the following components:    POC Creatinine 13.02 (*)     GFR Comment 5 (*)     GFR Non- 4 (*)     All other components within normal limits   POCT GLUCOSE - Abnormal; Notable for the following components:    POC Glucose 113 (*)     All other components within normal limits   CULTURE, URINE   CULTURE, BLOOD 1   CULTURE, BLOOD 1   LIPASE   LACTIC ACID, WHOLE BLOOD   HGB/HCT   POTASSIUM (POC)   CHLORIDE (POC)   PROTIME-INR   SPECIMEN REJECTION   SPECIMEN REJECTION   TSH WITHOUT REFLEX   PREVIOUS SPECIMEN   PREVIOUS SPECIMEN   SODIUM, URINE, RANDOM   POTASSIUM, URINE, RANDOM   CHLORIDE, URINE, RANDOM   BASIC METABOLIC PANEL   CREATININE, RANDOM URINE   POC CHEMISTRY (NA,K,ICA,GLU,CALC HCT/HGB,LACTATE,CREA,CL)   LACTIC ACID,POINT OF CARE   ANION GAP (CALC) POC           PLAN/ TASKS OUTSTANDING     Admission for acute renal failure with nephrology consultation      (Please note that portions of this note were completed with a voice recognition program.  Efforts were made to edit the dictations but occasionally words are mis-transcribed. )    Hernandez MD, F.A.C.E.P.   Attending Emergency Physician        Lisa Hammond MD  07/09/20 [id number]

## 2020-07-09 NOTE — ED PROVIDER NOTES
(11/13/15); Upper gastrointestinal endoscopy (3/8/16); Colonoscopy (03-10-16); Endoscopy, colon, diagnostic (7/24/2015); Endoscopy, colon, diagnostic (01/13/2016); Upper gastrointestinal endoscopy (12/22/2017); and Upper gastrointestinal endoscopy (N/A, 12/22/2017). Social History     Socioeconomic History    Marital status:      Spouse name: Not on file    Number of children: Not on file    Years of education: Not on file    Highest education level: Not on file   Occupational History    Not on file   Social Needs    Financial resource strain: Not on file    Food insecurity     Worry: Not on file     Inability: Not on file    Transportation needs     Medical: Not on file     Non-medical: Not on file   Tobacco Use    Smoking status: Current Every Day Smoker     Packs/day: 1.00     Years: 30.00     Pack years: 30.00     Types: Cigarettes    Smokeless tobacco: Never Used   Substance and Sexual Activity    Alcohol use: Not Currently    Drug use: No    Sexual activity: Not on file   Lifestyle    Physical activity     Days per week: Not on file     Minutes per session: Not on file    Stress: Not on file   Relationships    Social connections     Talks on phone: Not on file     Gets together: Not on file     Attends Baptism service: Not on file     Active member of club or organization: Not on file     Attends meetings of clubs or organizations: Not on file     Relationship status: Not on file    Intimate partner violence     Fear of current or ex partner: Not on file     Emotionally abused: Not on file     Physically abused: Not on file     Forced sexual activity: Not on file   Other Topics Concern    Not on file   Social History Narrative    Not on file       History reviewed. No pertinent family history. Allergies:  Patient has no known allergies. Home Medications:  Prior to Admission medications    Medication Sig Start Date End Date Taking?  Authorizing Provider   magnesium oxide PORTABLE    Comprehensive Metabolic Panel    LIPASE    CBC WITH AUTO DIFFERENTIAL    Troponin    Lactic Acid, Whole Blood    URINALYSIS    CK    MYOGLOBIN, SERUM    Hemoglobin and hematocrit, blood    SODIUM (POC)    POTASSIUM (POC)    CHLORIDE (POC)    CALCIUM, IONIC (POC)    Protime-INR    SPECIMEN REJECTION    PREVIOUS SPECIMEN    Microscopic Urinalysis    SPECIMEN REJECTION    Potassium    PREVIOUS SPECIMEN    Troponin    TSH without Reflex    Basic Metabolic Panel    Chloride, Random Urine    Potassium, urine, random    Sodium, urine, random    Protein / creatinine ratio, urine    Insert indwelling urinary catheter    Urine dipstick    Telemetry Monitoring    Inpatient consult to Critical Care    Inpatient consult to Nephrology    Inpatient consult to Nephrology    Inpatient consult to Internal Medicine    POC CHEMISTRY (NA,K,ICA,GLU,CALC HCT/HGB,LACTATE,CREA,CL)    Venous Blood Gas, POC    Creatinine W/GFR Point of Care    Lactic Acid, POC    POCT Glucose    Anion Gap (Calc) POC    EKG 12 Lead    PATIENT STATUS (FROM ED OR OR/PROCEDURAL) Inpatient       MEDICATIONS ORDERED:  Orders Placed This Encounter   Medications    0.9 % sodium chloride bolus    ondansetron (ZOFRAN) injection 4 mg    0.9 % sodium chloride bolus    DISCONTD: norepinephrine (LEVOPHED) 16 mg in sodium chloride 0.9 % 250 mL infusion    cefTRIAXone (ROCEPHIN) 1 g IVPB in 50 mL D5W minibag    0.9 % sodium chloride bolus    sodium chloride 0.9 % 1,000 mL infusion       DDX: Dehydration, urinary tract infection, pancreatitis, cholelithiasis, gastritis, viral gastroenteritis, bacterial gastroenteritis, COVID, angina, unstable angina    Initial MDM/Plan: 76 y.o. male who presents with an initial blood pressure in the 28M systolic with a map of 58.   Patient normally looks dry and dehydrated on physical exam.  Patient is mentating appropriately, responding appropriate to questions, states that he does not feel to off from his baseline. Plan to fluid resuscitate patient aggressively secondary to low blood pressure, decreased fluid intake, decreased urine output, and dry appearance. Plan to get full sepsis labs in order to assess for acute pathology including blood cultures, urinalysis with reflex to culture, TSH as patient has bulging eyes and fatigue, serial troponins, CMP, CK, myoglobin, lipase to assess for pancreatitis. PT, PTT to assess for acute liver failure in the setting of sepsis, EKG shows no acute pathology at this given time. DIAGNOSTIC RESULTS / EMERGENCYDEPARTMENT COURSE / MDM     LABS:  Labs Reviewed   COMPREHENSIVE METABOLIC PANEL - Abnormal; Notable for the following components:       Result Value    Glucose 120 (*)     BUN 78 (*)     CREATININE 13.73 (*)     Calcium 8.4 (*)     Potassium 3.5 (*)     Chloride 92 (*)     Anion Gap 28 (*)     Albumin/Globulin Ratio 0.9 (*)     GFR Non- 4 (*)     GFR  4 (*)     All other components within normal limits   CBC WITH AUTO DIFFERENTIAL - Abnormal; Notable for the following components:    RDW 14.7 (*)     Seg Neutrophils 78 (*)     Lymphocytes 12 (*)     All other components within normal limits   TROPONIN - Abnormal; Notable for the following components:    Troponin, High Sensitivity 46 (*)     All other components within normal limits   URINALYSIS - Abnormal; Notable for the following components:    Color, UA DARK YELLOW (*)     Turbidity UA CLOUDY (*)     Bilirubin Urine NEGATIVE  Verified by ictotest. (*)     Ketones, Urine TRACE (*)     Protein, UA TRACE (*)     Leukocyte Esterase, Urine TRACE (*)     All other components within normal limits   CK - Abnormal; Notable for the following components:     Total  (*)     All other components within normal limits   MYOGLOBIN, SERUM - Abnormal; Notable for the following components:    Myoglobin 1,956 (*)     All other components within normal limits   SODIUM (POC) - Abnormal; Notable for the following components:    POC Sodium 135 (*)     All other components within normal limits   CALCIUM, IONIC (POC) - Abnormal; Notable for the following components:    POC Ionized Calcium 0.84 (*)     All other components within normal limits   MICROSCOPIC URINALYSIS - Abnormal; Notable for the following components:    Bacteria, UA FEW (*)     Mucus, UA 1+ (*)     All other components within normal limits   POTASSIUM - Abnormal; Notable for the following components:    Potassium 3.6 (*)     All other components within normal limits   TROPONIN - Abnormal; Notable for the following components:    Troponin, High Sensitivity 36 (*)     All other components within normal limits   VENOUS BLOOD GAS, POINT OF CARE - Abnormal; Notable for the following components:    pCO2, Tino 35.2 (*)     All other components within normal limits   CREATININE W/GFR POINT OF CARE - Abnormal; Notable for the following components:    POC Creatinine 13.02 (*)     GFR Comment 5 (*)     GFR Non- 4 (*)     All other components within normal limits   POCT GLUCOSE - Abnormal; Notable for the following components:    POC Glucose 113 (*)     All other components within normal limits   CULTURE, URINE   CULTURE, BLOOD 1   CULTURE, BLOOD 1   LIPASE   LACTIC ACID, WHOLE BLOOD   HGB/HCT   POTASSIUM (POC)   CHLORIDE (POC)   PROTIME-INR   SPECIMEN REJECTION   SPECIMEN REJECTION   TSH WITHOUT REFLEX   PREVIOUS SPECIMEN   PREVIOUS SPECIMEN   BASIC METABOLIC PANEL   CHLORIDE, URINE, RANDOM   POTASSIUM, URINE, RANDOM   SODIUM, URINE, RANDOM   PROTEIN / CREATININE RATIO, URINE   POC CHEMISTRY (NA,K,ICA,GLU,CALC HCT/HGB,LACTATE,CREA,CL)   LACTIC ACID,POINT OF CARE   ANION GAP (CALC) POC         RADIOLOGY:  Xr Chest Portable    Result Date: 7/9/2020  EXAMINATION: ONE XRAY VIEW OF THE CHEST 7/9/2020 3:20 pm COMPARISON: 26 January 2020 HISTORY: ORDERING SYSTEM PROVIDED HISTORY: hypotensive, fatigue TECHNOLOGIST PROVIDED HISTORY: hypotensive, fatigue Reason for Exam: [place], hypotensive x 3 days/ AP erect/  port Acuity: Unknown Type of Exam: Unknown FINDINGS: AP portable view of the chest time stamped at 1508 hours demonstrates overlying cardiac monitoring electrodes. Heart size is stable. No vascular congestion, focal consolidation, effusion, or pneumothorax is noted. Osseous structures are stable with degenerative changes in the spine and shoulders. Mediastinal contours are unremarkable. No evidence of acute cardiopulmonary process. EKG  \Normal sinus rhythm, normal rate, normal axis, PVCs noted, nonspecific T wave depressions in V5, V6, no ST elevations normal, no pathologic Q waves, T waves    All EKG's are interpreted by the Emergency Department Physicianwho either signs or Co-signs this chart in the absence of a cardiologist.    EMERGENCY DEPARTMENT COURSE:  ED Course as of Jul 09 2042   Thu Jul 09, 2020   1523 Patient's lactic acid notably 1.7 on official blood draw. Patient does have an elevated troponin as well as creatinine, patient does state there is a decreased urine output over the past couple days. Plan to wait for formal creatinine. Lactic Acid, Whole Blood: 1.7 [GP]   1526 Patient's map is notably 59 after 2 liters of fluid, plan to place central line right now    [GP]      ED Course User Index  [GP] Brendan Gong MD     Patient did not need for central line is patient began to have good pressures on third liter of fluid. Current systolic blood pressure is 112/57  Patient was noted to be an acute renal failure with acute bump in creatinine from 1.17 to above 13. Nephrology consulted, recommended following up on urine sodium, chloride, potassium, creatinine, continue fluid resuscitation, repeat BMP in approximately 6 hours which is been ordered. Patient admitted to internal medicine.   Patient does not appear to be actively septic based on serial lactic acids, however patient does notably have a urinary tract infection. With this and hypotension, blood cultures were drawn, patient was given IV 1 g of Rocephin for suspected urinary tract infection. Patient was admitted to internal medicine for continued management with nephrology following. Patient care handed off to Dr. Ember Castillo:  None    CONSULTS:  IP CONSULT TO CRITICAL CARE  IP CONSULT TO NEPHROLOGY  IP CONSULT TO NEPHROLOGY  IP CONSULT TO INTERNAL MEDICINE    CRITICAL CARE:  Please see attending note    FINAL IMPRESSION      1.  Acute renal failure, unspecified acute renal failure type [facility])          DISPOSITION / [address] Admitted 07/09/2020 05:45:36 PM      PATIENT REFERRED TO:  Brandie Mendoza, APRN - CNP  [address] [facility]  [phone]            DISCHARGE MEDICATIONS:  New Prescriptions    No medications on file       Brendan oGng MD  Emergency Medicine Resident    (Please note that portions of this note were completed with a voice recognition program.Efforts were made to edit the dictations but occasionally words are mis-transcribed.)       Brendan Gong MD  Resident  07/09/20 [id number]

## 2020-07-09 NOTE — ED PROVIDER NOTES
by me    EKG shows normal sinus rhythm with PVCs, some nonspecific ST depressions in V5 and V6, no ST elevations noted, normal axis.         Vero Sunshine D.O, M.P.H  Attending Emergency Medicine Physician         Vero Sunshien DO  07/09/20 0173

## 2020-07-09 NOTE — ED NOTES
Spoke with Dr. Bridget Moore about the second blood cultures - she states okay to start Rocephin at this time with two hour window to get the second set of cultures.      Kaitlynn Gomez RN  07/09/20 [id number]

## 2020-07-10 ENCOUNTER — APPOINTMENT (OUTPATIENT)
Dept: ULTRASOUND IMAGING | Age: 65
DRG: 683 | End: 2020-07-10
Payer: MEDICARE

## 2020-07-10 LAB
-: NORMAL
ABSOLUTE EOS #: 0.1 K/UL (ref 0–0.44)
ABSOLUTE IMMATURE GRANULOCYTE: 0.03 K/UL (ref 0–0.3)
ABSOLUTE LYMPH #: 0.96 K/UL (ref 1.1–3.7)
ABSOLUTE MONO #: 0.67 K/UL (ref 0.1–1.2)
ANION GAP SERPL CALCULATED.3IONS-SCNC: 18 MMOL/L (ref 9–17)
BASOPHILS # BLD: 0 % (ref 0–2)
BASOPHILS ABSOLUTE: <0.03 K/UL (ref 0–0.2)
BUN BLDV-MCNC: 75 MG/DL (ref 8–23)
BUN/CREAT BLD: ABNORMAL (ref 9–20)
CALCIUM SERPL-MCNC: 6.5 MG/DL (ref 8.6–10.4)
CHLORIDE BLD-SCNC: 105 MMOL/L (ref 98–107)
CO2: 19 MMOL/L (ref 20–31)
CREAT SERPL-MCNC: 8.63 MG/DL (ref 0.7–1.2)
CULTURE: NORMAL
DIFFERENTIAL TYPE: ABNORMAL
EKG ATRIAL RATE: 68 BPM
EKG P AXIS: 51 DEGREES
EKG P-R INTERVAL: 150 MS
EKG Q-T INTERVAL: 454 MS
EKG QRS DURATION: 98 MS
EKG QTC CALCULATION (BAZETT): 482 MS
EKG R AXIS: 20 DEGREES
EKG T AXIS: 8 DEGREES
EKG VENTRICULAR RATE: 68 BPM
EOSINOPHIL,URINE: NORMAL
EOSINOPHILS RELATIVE PERCENT: 2 % (ref 1–4)
ESTIMATED AVERAGE GLUCOSE: 137 MG/DL
GFR AFRICAN AMERICAN: 8 ML/MIN
GFR NON-AFRICAN AMERICAN: 6 ML/MIN
GFR SERPL CREATININE-BSD FRML MDRD: ABNORMAL ML/MIN/{1.73_M2}
GFR SERPL CREATININE-BSD FRML MDRD: ABNORMAL ML/MIN/{1.73_M2}
GLUCOSE BLD-MCNC: 108 MG/DL (ref 70–99)
HBA1C MFR BLD: 6.4 % (ref 4–6)
HCT VFR BLD CALC: 34 % (ref 40.7–50.3)
HEMOGLOBIN: 11.4 G/DL (ref 13–17)
IMMATURE GRANULOCYTES: 1 %
LYMPHOCYTES # BLD: 15 % (ref 24–43)
Lab: NORMAL
MAGNESIUM: 2.1 MG/DL (ref 1.6–2.6)
MCH RBC QN AUTO: 30.7 PG (ref 25.2–33.5)
MCHC RBC AUTO-ENTMCNC: 33.5 G/DL (ref 28.4–34.8)
MCV RBC AUTO: 91.6 FL (ref 82.6–102.9)
MONOCYTES # BLD: 10 % (ref 3–12)
NRBC AUTOMATED: 0 PER 100 WBC
PDW BLD-RTO: 14.9 % (ref 11.8–14.4)
PLATELET # BLD: 178 K/UL (ref 138–453)
PLATELET ESTIMATE: ABNORMAL
PMV BLD AUTO: 9.7 FL (ref 8.1–13.5)
POTASSIUM SERPL-SCNC: 2.7 MMOL/L (ref 3.7–5.3)
POTASSIUM SERPL-SCNC: 3.7 MMOL/L (ref 3.7–5.3)
RBC # BLD: 3.71 M/UL (ref 4.21–5.77)
RBC # BLD: ABNORMAL 10*6/UL
REASON FOR REJECTION: NORMAL
SEG NEUTROPHILS: 73 % (ref 36–65)
SEGMENTED NEUTROPHILS ABSOLUTE COUNT: 4.7 K/UL (ref 1.5–8.1)
SODIUM BLD-SCNC: 142 MMOL/L (ref 135–144)
SPECIMEN DESCRIPTION: NORMAL
TROPONIN INTERP: NORMAL
TROPONIN T: NORMAL NG/ML
TROPONIN, HIGH SENSITIVITY: 19 NG/L (ref 0–22)
WBC # BLD: 6.5 K/UL (ref 3.5–11.3)
WBC # BLD: ABNORMAL 10*3/UL
ZZ NTE CLEAN UP: ORDERED TEST: NORMAL
ZZ NTE WITH NAME CLEAN UP: SPECIMEN SOURCE: NORMAL

## 2020-07-10 PROCEDURE — 6370000000 HC RX 637 (ALT 250 FOR IP): Performed by: NURSE PRACTITIONER

## 2020-07-10 PROCEDURE — 97535 SELF CARE MNGMENT TRAINING: CPT

## 2020-07-10 PROCEDURE — 84165 PROTEIN E-PHORESIS SERUM: CPT

## 2020-07-10 PROCEDURE — 76770 US EXAM ABDO BACK WALL COMP: CPT

## 2020-07-10 PROCEDURE — 2580000003 HC RX 258: Performed by: INTERNAL MEDICINE

## 2020-07-10 PROCEDURE — 6360000002 HC RX W HCPCS: Performed by: STUDENT IN AN ORGANIZED HEALTH CARE EDUCATION/TRAINING PROGRAM

## 2020-07-10 PROCEDURE — 83036 HEMOGLOBIN GLYCOSYLATED A1C: CPT

## 2020-07-10 PROCEDURE — 83735 ASSAY OF MAGNESIUM: CPT

## 2020-07-10 PROCEDURE — 83883 ASSAY NEPHELOMETRY NOT SPEC: CPT

## 2020-07-10 PROCEDURE — 85025 COMPLETE CBC W/AUTO DIFF WBC: CPT

## 2020-07-10 PROCEDURE — 84484 ASSAY OF TROPONIN QUANT: CPT

## 2020-07-10 PROCEDURE — 99223 1ST HOSP IP/OBS HIGH 75: CPT | Performed by: INTERNAL MEDICINE

## 2020-07-10 PROCEDURE — 84155 ASSAY OF PROTEIN SERUM: CPT

## 2020-07-10 PROCEDURE — 6370000000 HC RX 637 (ALT 250 FOR IP): Performed by: STUDENT IN AN ORGANIZED HEALTH CARE EDUCATION/TRAINING PROGRAM

## 2020-07-10 PROCEDURE — 86038 ANTINUCLEAR ANTIBODIES: CPT

## 2020-07-10 PROCEDURE — 6360000002 HC RX W HCPCS: Performed by: INTERNAL MEDICINE

## 2020-07-10 PROCEDURE — 97165 OT EVAL LOW COMPLEX 30 MIN: CPT

## 2020-07-10 PROCEDURE — 84132 ASSAY OF SERUM POTASSIUM: CPT

## 2020-07-10 PROCEDURE — 36415 COLL VENOUS BLD VENIPUNCTURE: CPT

## 2020-07-10 PROCEDURE — 2060000000 HC ICU INTERMEDIATE R&B

## 2020-07-10 PROCEDURE — 82330 ASSAY OF CALCIUM: CPT

## 2020-07-10 PROCEDURE — 87205 SMEAR GRAM STAIN: CPT

## 2020-07-10 PROCEDURE — 86160 COMPLEMENT ANTIGEN: CPT

## 2020-07-10 PROCEDURE — 80048 BASIC METABOLIC PNL TOTAL CA: CPT

## 2020-07-10 PROCEDURE — 2580000003 HC RX 258: Performed by: STUDENT IN AN ORGANIZED HEALTH CARE EDUCATION/TRAINING PROGRAM

## 2020-07-10 PROCEDURE — 83516 IMMUNOASSAY NONANTIBODY: CPT

## 2020-07-10 RX ORDER — FLUTICASONE PROPIONATE 50 MCG
2 SPRAY, SUSPENSION (ML) NASAL DAILY
Status: DISCONTINUED | OUTPATIENT
Start: 2020-07-10 | End: 2020-07-12 | Stop reason: HOSPADM

## 2020-07-10 RX ORDER — POTASSIUM CHLORIDE 20 MEQ/1
40 TABLET, EXTENDED RELEASE ORAL 2 TIMES DAILY WITH MEALS
Status: DISCONTINUED | OUTPATIENT
Start: 2020-07-10 | End: 2020-07-11

## 2020-07-10 RX ORDER — GUAIFENESIN 100 MG/5ML
200 SOLUTION ORAL EVERY 4 HOURS PRN
Status: DISCONTINUED | OUTPATIENT
Start: 2020-07-10 | End: 2020-07-12 | Stop reason: HOSPADM

## 2020-07-10 RX ADMIN — FLUTICASONE PROPIONATE 2 SPRAY: 50 SPRAY, METERED NASAL at 22:13

## 2020-07-10 RX ADMIN — HEPARIN SODIUM 5000 UNITS: 5000 INJECTION INTRAVENOUS; SUBCUTANEOUS at 15:08

## 2020-07-10 RX ADMIN — HEPARIN SODIUM 5000 UNITS: 5000 INJECTION INTRAVENOUS; SUBCUTANEOUS at 05:14

## 2020-07-10 RX ADMIN — HEPARIN SODIUM 5000 UNITS: 5000 INJECTION INTRAVENOUS; SUBCUTANEOUS at 22:21

## 2020-07-10 RX ADMIN — SODIUM CHLORIDE, PRESERVATIVE FREE 10 ML: 5 INJECTION INTRAVENOUS at 09:00

## 2020-07-10 RX ADMIN — CEFTRIAXONE SODIUM 1 G: 1 INJECTION, POWDER, FOR SOLUTION INTRAMUSCULAR; INTRAVENOUS at 22:12

## 2020-07-10 RX ADMIN — POTASSIUM CHLORIDE 40 MEQ: 1500 TABLET, EXTENDED RELEASE ORAL at 15:08

## 2020-07-10 ASSESSMENT — ENCOUNTER SYMPTOMS
SHORTNESS OF BREATH: 0
NAUSEA: 1
COUGH: 1
VOMITING: 1
ABDOMINAL PAIN: 0

## 2020-07-10 NOTE — ED PROVIDER NOTES
[address] [facility]  Emergency Department  Emergency Medicine Resident Sign-out     Care of Letha Felty was assumed from Dr. Boris Dixon and is being seen for Fatigue  . The patient's initial evaluation and plan have been discussed with the prior provider who initially evaluated the patient. EMERGENCY DEPARTMENT COURSE / MEDICAL DECISION MAKING:       MEDICATIONS GIVEN:  Orders Placed This Encounter   Medications    0.9 % sodium chloride bolus    ondansetron (ZOFRAN) injection 4 mg    0.9 % sodium chloride bolus    DISCONTD: norepinephrine (LEVOPHED) 16 mg in sodium chloride 0.9 % 250 mL infusion    cefTRIAXone (ROCEPHIN) 1 g IVPB in 50 mL D5W minibag    0.9 % sodium chloride bolus    sodium chloride 0.9 % 1,000 mL infusion       LABS / RADIOLOGY:     Labs Reviewed   COMPREHENSIVE METABOLIC PANEL - Abnormal; Notable for the following components:       Result Value    Glucose 120 (*)     BUN 78 (*)     CREATININE 13.73 (*)     Calcium 8.4 (*)     Potassium 3.5 (*)     Chloride 92 (*)     Anion Gap 28 (*)     Albumin/Globulin Ratio 0.9 (*)     GFR Non- 4 (*)     GFR  4 (*)     All other components within normal limits   CBC WITH AUTO DIFFERENTIAL - Abnormal; Notable for the following components:    RDW 14.7 (*)     Seg Neutrophils 78 (*)     Lymphocytes 12 (*)     All other components within normal limits   TROPONIN - Abnormal; Notable for the following components:    Troponin, High Sensitivity 46 (*)     All other components within normal limits   URINALYSIS - Abnormal; Notable for the following components:    Color, UA DARK YELLOW (*)     Turbidity UA CLOUDY (*)     Bilirubin Urine NEGATIVE  Verified by ictotest. (*)     Ketones, Urine TRACE (*)     Protein, UA TRACE (*)     Leukocyte Esterase, Urine TRACE (*)     All other components within normal limits   CK - Abnormal; Notable for the following components:     Total  (*)     All other components XRAY VIEW OF THE CHEST 7/9/2020 3:20 pm COMPARISON: 26 January 2020 HISTORY: ORDERING SYSTEM PROVIDED HISTORY: hypotensive, fatigue TECHNOLOGIST PROVIDED HISTORY: hypotensive, fatigue Reason for Exam: [place], hypotensive x 3 days/ AP erect/  port Acuity: Unknown Type of Exam: Unknown FINDINGS: AP portable view of the chest time stamped at 1508 hours demonstrates overlying cardiac monitoring electrodes. Heart size is stable. No vascular congestion, focal consolidation, effusion, or pneumothorax is noted. Osseous structures are stable with degenerative changes in the spine and shoulders. Mediastinal contours are unremarkable. No evidence of acute cardiopulmonary process. RECENT VITALS:     Temp: 98.1 °F (36.7 °C),  Pulse: 65,  , BP: (!) 112/57,      This patient is a 76 y.o. Male with nausea and vomiting with decreased appetite approximately 5 days duration, patient has begin to feel fatigued for 4 days duration, has had lightheadedness and dizziness of 2 days duration. Patient notably has acute renal failure secondary to suppose a dehydration with an elevation of creatinine from 1.17 in January of this year to above 13. Sepsis work-up was initiated, patient on initial presentation had blood pressures of 80 systolic, has been rehydrated with 3 L of fluid, is currently on maintenance 125 mL normal saline per hour with most recent blood pressure being 112/57. Blood cultures drawn, patient given 1 g of Rocephin for concerns for urinary tract infection based on positive UA, patient is currently awaiting bed placement to stepdown with nephrology following. Patient is admitted to internal medicine service. Patient notably has a BMP pending for 10 PM, patient's most recent potassium was 3.7 prior to that. OUTSTANDING TASKS / RECOMMENDATIONS:    1. Follow-up on BMP  2. Await bed placement     FINAL IMPRESSION:     1.  Acute renal failure, unspecified acute renal failure type ([address]        DISPOSITION: DISPOSITION:  []  Discharge   []  Transfer -    [x]  Admission -   Internal Medicine    []  Against Medical Advice   []  Eloped   FOLLOW-UP: Warren Seth, APRN - CNP  [address]  [phone]           DISCHARGE MEDICATIONS: New Prescriptions    No medications on file           Matthew Gale MD  Emergency Medicine Resident  [facility]        Matthew Gale MD  Resident  07/10/20 7273

## 2020-07-10 NOTE — PROGRESS NOTES
Occupational Therapy   Occupational Therapy Initial Assessment  Date: 7/10/2020   Patient Name: Sonny Cárdenas  MRN: [id number]     : 1955    Chief Complaint   Patient presents with    Fatigue     Date of Service: 7/10/2020    Discharge Recommendations:    No therapy recommended at discharge. OT Equipment Recommendations  Equipment Needed: No    Assessment   Performance deficits / Impairments: Decreased functional mobility ; Decreased ADL status; Decreased safe awareness  Assessment: Pt would benefit from continued acute care and post acute care OT to address minimal deficits in ADL/ functional activities, endurance and functional transfers/ mobility following admission. Pt required increased time and effort, cues for safety  awareness during session. Prognosis: Good  Decision Making: Low Complexity  OT Education: Plan of Care;OT Role;Transfer Training  Patient Education: purpose of evalution, good return   REQUIRES OT FOLLOW UP: Yes  Activity Tolerance  Activity Tolerance: Patient limited by fatigue;Patient Tolerated treatment well  Safety Devices  Safety Devices in place: Yes  Type of devices: Left in bed;Call light within reach;Nurse notified;Gait belt  Restraints  Initially in place: No         Patient Diagnosis(es): The encounter diagnosis was Acute renal failure, unspecified acute renal failure type ([address] has a past medical history of Adenomatous colon polyp, Arthritis, CVA (cerebral vascular accident) ([address] Diverticulosis of colon, History of colon polyps, Hyperlipidemia, Hypertension, Internal carotid artery stenosis, right: 50 to 69%, MI (myocardial infarction) ([address] Poor historian, and Transient ischemic attack (TIA). has a past surgical history that includes Colonoscopy (07/25/15); Cardiac surgery (); Colonoscopy (12/15/15); hemicolectomy (Right, 12/15/15); Colon surgery (Right, 12/15/15); Upper gastrointestinal endoscopy (16);  Upper gastrointestinal endoscopy (11/13/15); Upper gastrointestinal endoscopy (3/8/16); Colonoscopy (03-10-16); Endoscopy, colon, diagnostic (7/24/2015); Endoscopy, colon, diagnostic (01/13/2016); Upper gastrointestinal endoscopy (12/22/2017); and Upper gastrointestinal endoscopy (N/A, 12/22/2017). Restrictions  Restrictions/Precautions  Restrictions/Precautions: General Precautions, Up as Tolerated  Required Braces or Orthoses?: No    Subjective   General  Patient assessed for rehabilitation services?: Yes  Family / Caregiver Present: No  Patient Currently in Pain: Denies    Social/Functional History  Social/Functional History  Lives With: Son(2 sons )  Type of Home: House  Home Layout: One level  Home Access: Stairs to enter without rails  Entrance Stairs - Number of Steps: 1 small SHARON  Bathroom Shower/Tub: Tub/Shower unit  Bathroom Toilet: Standard  Bathroom Equipment: (none)  Home Equipment: (none)  ADL Assistance: Independent  Homemaking Assistance: Independent  Homemaking Responsibilities: Yes  Meal Prep Responsibility: Primary  Laundry Responsibility: Primary  Cleaning Responsibility: Primary  Shopping Responsibility: Primary  Ambulation Assistance: Independent  Transfer Assistance: Independent  Active : No  Patient's  Info: girlfriend assists with driving   Occupation: Retired  Leisure & Hobbies: walking the dog, watching TV    Objective   Vision: Impaired  Vision Exceptions: Wears glasses for reading;Wears glasses for distance;Wears glasses at all times  Hearing: Within functional limits    Orientation  Overall Orientation Status: Within Functional Limits  Observation/Palpation  Posture: Fair  Observation: forward flexed posture   Balance  Sitting Balance: Independent  Standing Balance: Contact guard assistance(no AD)  Standing Balance  Time: 5 min  Activity: sit <> stand transfer, functional mobility into bathroom  Comment: Pt demo no LOB. Minimal unsteadiness.    Functional Mobility  Functional - Mobility Device: No device  Activity: To/from bathroom  Assist Level: Contact guard assistance  Functional Mobility Comments: Pt demo minimal unsteadiness, may be limited d/t lines      ADL  Feeding: Independent  Grooming: Independent  UE Bathing: Minimal assistance;Setup(to wash back, pt able to complete BUE and chest, apply deodorant )  LE Bathing: Setup;Contact guard assistance  UE Dressing: Setup;Contact guard assistance  LE Dressing: Setup;Contact guard assistance(to manage socks )  Toileting: Stand by assistance  Additional Comments: Pt supine in bed on arrival. Pt completed bed mobility and sat at EOB. Pt completed sit > stand transfer and functional mobility into bathroom. Pt completed UE ADL activities, declined LE ADL activities. Pt returned to bed, call light in reach and RN notified on therapist exit.       Tone RUE  RUE Tone: Normotonic  Tone LUE  LUE Tone: Normotonic  Coordination  Movements Are Fluid And Coordinated: Yes        Bed mobility  Rolling to Left: Independent  Rolling to Right: Independent  Supine to Sit: Independent  Sit to Supine: Independent  Scooting: Independent  Comment: HOB flat     Transfers  Stand Step Transfers: Contact guard assistance  Sit to stand: Contact guard assistance  Stand to sit: Contact guard assistance        Cognition  Overall Cognitive Status: WFL  Cognition Comment: Minorly impulsive, cues to allow writer to assist     Perception  Overall Perceptual Status: WFL        Sensation  Overall Sensation Status: WFL(Pt denies numbness/ tingling )    LUE AROM (degrees)  LUE AROM : WFL  RUE AROM (degrees)  RUE AROM : WFL  LUE Strength  Gross LUE Strength: WFL  RUE Strength  Gross RUE Strength: WFL     Plan   Plan  Times per week: 1-2 total sessions  Current Treatment Recommendations: Functional Mobility Training, Safety Education & Training, Patient/Caregiver Education & Training, Self-Care / ADL    [facility] Score  [facility] Inpatient Daily Activity Raw Score: 21 (07/10/20 5591)  AM-[facility] Inpatient ADL T-Scale Score : 44.27 (07/10/20 1518)  ADL Inpatient CMS 0-100% Score: 32.79 (07/10/20 1518)  ADL Inpatient CMS G-Code Modifier : Gardenia Gray (07/10/20 1518)    Goals  Short term goals  Time Frame for Short term goals: By discharge, pt will  Short term goal 1: demo I in [place] ADL activities   Short term goal 2: demo increased activity tolerance of 30+ min to assist with ADL/ functional activities   Short term goal 3: demo I in functional transfers/ mobility with good safety awareness to assist with ADL/ functional activities   Patient Goals   Patient goals : to go home    Therapy Time   Individual Concurrent Group Co-treatment   Time In 0930         Time Out 1002         Minutes 32         Timed Code Treatment Minutes: 23 Minutes   See above for LOF. RN reports patient is medically stable for therapy treatment this date. Chart reviewed prior to treatment and patient is agreeable for therapy. All lines intact and patient positioned comfortably at end of treatment. All patient needs addressed prior to ending therapy session.       Noreen Haley OTR/L

## 2020-07-10 NOTE — PROGRESS NOTES
Senior Note. 54-year-old gentleman presented to ED complaining of fatigue and tiredness which has been going on for for last 2 to 3 days associated with multiple episodes of nausea and vomiting and unable to keep anything down for last 3 days patient also reports decreased urinary output. Denies any chest pain, shortness of breath, fever, chills, leg pain, leg swelling, trouble sleeping, sick contacts, recent travel, history of HIV. Past medical history significant for CVA, hyperlipidemia hyperal tension, ICA stenosis, MI status post stents placed. In ED point-of-care labs done which showed elevated creatinine of around, potassium 3.5. In ED patient was hypotensive received 3 L of fluid bolus. Assessment and plan. 1. Acute renal failure. Creatinine 13. Baseline creatinine normal.  Denies using NSAID, other medication, receiving contrast.  Nephrology has been consulted from ED. 2. UTI. Urine analysis showed few bacteria with trace leukocyte esterase. Started on Rocephin 1 g. Follow-up on urine culture and sensitivity. 3. Rhabdomyolysis. Patient denied any recent trauma. , myoglobin 1956. Continue IV fluids. Avoid nephrotoxic drugs. 4. Essential hypertension. Currently hypotensive. No antihypertensive. Patient received 3 L fluid bolus. Continue IV fluids. 5. Coronary artery disease a status post a stent placed. Resumed aspirin, Lipitor 80 mg, will hold lisinopril, Lopressor due to hypotension.     DVT prophylaxis Heparin 5K subcu  GI prophylaxis not indicated  PT OT consulted  Discharge planning as well as case management    Johnna Estrada MD  PGY-2, Internal medicine resident  [facility], [place], [place]

## 2020-07-10 NOTE — PROGRESS NOTES
Pt arrived to room 2024 via stretcher. Pt ambulatory to bed with no issues, gait steady. All belongings kept with pt. Vitals, assessment, and admission completed. Call light within reach. Will continue to monitor.     Electronically signed by Marbella Raphael RN on 7/10/2020 at 1:35 AM

## 2020-07-10 NOTE — H&P
[facility]     Department of Internal Medicine - Staff Internal Medicine Teaching Service          ADMISSION NOTE/HISTORY AND PHYSICAL EXAMINATION   Date: 7/9/2020  Patient Name: Ti Cintron  Date of admission: 7/9/2020  2:07 PM  YOB: 1955  PCP: CESAR Brenner CNP  History Obtained From:  patient, electronic medical record    CHIEF COMPLAINT     Chief complaint: Fatigue    HISTORY OF PRESENTING ILLNESS     The patient is a pleasant 76 y.o. male presents with a chief complaint of fatigue. For the past week the patient has been experiencing nausea and vomiting and subsequently has decreased his oral intake. The nausea and vomiting has been especially apparent during the last 5 days and for the past 2 days he has had trouble standing and dizziness. Patient says he just wants to sleep; he does report a mild cough. He has decreased urine output and appears hypotensive on exam.  He denies any chest pain, shortness of breath, fever, chills, leg pain, and leg swelling. He also denies being in close contact with anyone who has been sick. PMH - GERD, HTN, HTN encephalopathy, CHF, hyperlipidemia, MI with stents placed     FH - N/A    PSH - colonoscopy, cardiac surgery, hemicolectomy, endocscopy    SH - 30 pack years     Review of Systems   Constitutional: Positive for fatigue. Negative for chills and fever. Respiratory: Positive for cough. Negative for shortness of breath. Gastrointestinal: Positive for nausea and vomiting. Negative for abdominal pain. Genitourinary: Positive for decreased urine volume. Negative for difficulty urinating and frequency. Neurological: Positive for dizziness.        PAST MEDICAL HISTORY     Past Medical History:   Diagnosis Date    Adenomatous colon polyp 2016    Arthritis     CVA [facility]cerebral vascular accident) ([facility] [address] 03/2016    Diverticulosis of colon     History of colon polyps 2016    Hyperlipidemia     swallow 1 tablet by mouth once daily 20  Yes [place] CESAR Sylvester CNP   [facility]) 5 MG tablet take 1 tablet by mouth once daily 20  Yes [place] CESAR Sylvester CNP   furosemide (LASIX) 20 MG tablet Take 1 tablet by mouth daily 20  Yes Branden Vasquez MD   Acetaminophen (TYLENOL EXTRA STRENGTH PO) Take by mouth   Yes Historical Provider, MD   metoprolol tartrate (LOPRESSOR) 25 MG tablet Take 0.5 tablets by mouth 2 times daily 20  Yes [place] CESAR Sylvester CNP   lisinopril (PRINIVIL;ZESTRIL) 5 MG tablet Take 1 tablet by mouth daily 20  Yes [place] Higinio, APRN - CNP   Cyanocobalamin ER (RA VITAMIN B-12 TR) 1000 MCG TBCR take 1 tablet by mouth once daily as directed 20  Yes [place] Higinio, APRN - CNP   omeprazole (PRILOSEC) 20 MG delayed release capsule Take 20 mg by mouth daily   Yes Historical Provider, MD   atorvastatin (LIPITOR) 80 MG tablet Take 1 tablet by mouth nightly 20   Branden Vasquez MD   nicotine (NICODERM CQ) 21 MG/24HR Place 1 patch onto the skin daily as needed (if patient smokes) 19   Del Cox,        SOCIAL HISTORY     Tobacco: 30 pack years  Alcohol: None   Illicits: None   Occupation: Did not ask     FAMILY HISTORY     History reviewed. No pertinent family history. PHYSICAL EXAM     Vitals: BP (!) 96/40   Pulse 65   Temp 98.5 °F (36.9 °C) (Oral)   Resp 14   Ht 5' 9\" (1.753 m)   Wt 190 lb 14.7 oz (86.6 kg)   SpO2 96%   BMI 28.19 kg/m²   Tmax: Temp (24hrs), Av.3 °F (36.8 °C), Min:98.1 °F (36.7 °C), Max:98.5 °F (36.9 °C)    Last Body weight:   Wt Readings from Last 3 Encounters:   20 190 lb 14.7 oz (86.6 kg)   20 192 lb 6.4 oz (87.3 kg)   20 190 lb 4.1 oz (86.3 kg)     Body Mass Index : Body mass index is 28.19 kg/m².       PHYSICAL EXAMINATION:  Constitutional: This is a well developed, well nourished, 25-29.9 - Overweight 76y.o. year old male who is alert, oriented, cooperative and in no apparent Creatinine 13.02 (HH) 0.51 - 1.19 mg/dL    GFR Comment 5 (L) >60 mL/min    GFR Non-African American 4 (L) >60 mL/min    GFR Comment         SODIUM (POC)    Collection Time: 07/09/20  2:44 PM   Result Value Ref Range    POC Sodium 135 (L) 138 - 146 mmol/L   POTASSIUM (POC)    Collection Time: 07/09/20  2:44 PM   Result Value Ref Range    POC Potassium 3.5 3.5 - 4.5 mmol/L   CHLORIDE (POC)    Collection Time: 07/09/20  2:44 PM   Result Value Ref Range    POC Chloride 102 98 - 107 mmol/L   CALCIUM, IONIC (POC)    Collection Time: 07/09/20  2:44 PM   Result Value Ref Range    POC Ionized Calcium 0.84 (L) 1.15 - 1.33 mmol/L   Lactic Acid, POC    Collection Time: 07/09/20  2:44 PM   Result Value Ref Range    POC Lactic Acid 1.12 0.56 - 1.39 mmol/L   POCT Glucose    Collection Time: 07/09/20  2:44 PM   Result Value Ref Range    POC Glucose 113 (H) 74 - 100 mg/dL   Anion Gap (Calc) POC    Collection Time: 07/09/20  2:44 PM   Result Value Ref Range    Anion Gap 11 7 - 16 mmol/L   Lactic Acid, Whole Blood    Collection Time: 07/09/20  3:03 PM   Result Value Ref Range    Lactic Acid, Whole Blood 1.7 0.7 - 2.1 mmol/L   CK    Collection Time: 07/09/20  3:03 PM   Result Value Ref Range    Total  (H) 39 - 308 U/L   MYOGLOBIN, SERUM    Collection Time: 07/09/20  3:03 PM   Result Value Ref Range    Myoglobin 1,956 (H) 28 - 72 ng/mL   SPECIMEN REJECTION    Collection Time: 07/09/20  3:42 PM   Result Value Ref Range    Specimen Source . BLOOD     Ordered Test K, TROPI, TSH     Reason for Rejection Unable to perform testing: Specimen contaminated.      - NOT REPORTED    URINALYSIS    Collection Time: 07/09/20  4:03 PM   Result Value Ref Range    Color, UA DARK YELLOW (A) YELLOW    Turbidity UA CLOUDY (A) CLEAR    Glucose, Ur NEGATIVE NEGATIVE    Bilirubin Urine NEGATIVE  Verified by ictotest. (A) NEGATIVE    Ketones, Urine TRACE (A) NEGATIVE    Specific Gravity, UA 1.027 1.005 - 1.030    Urine Hgb NEGATIVE NEGATIVE    pH, UA 5.0 5.0 - 8.0    Protein, UA TRACE (A) NEGATIVE    Urobilinogen, Urine Normal Normal    Nitrite, Urine NEGATIVE NEGATIVE    Leukocyte Esterase, Urine TRACE (A) NEGATIVE    Urinalysis Comments NOT REPORTED    Microscopic Urinalysis    Collection Time: 07/09/20  4:03 PM   Result Value Ref Range    -          WBC, UA 10 TO 20 0 - 5 /HPF    RBC, UA 5 TO 10 0 - 2 /HPF    Casts UA HYALINE 0 - 2 /LPF    Casts UA 20 TO 50 0 - 2 /LPF    Crystals, UA NOT REPORTED None /HPF    Epithelial Cells UA 0 TO 2 0 - 5 /HPF    Renal Epithelial, UA NOT REPORTED 0 /HPF    Bacteria, UA FEW (A) None    Mucus, UA 1+ (A) None    Trichomonas, UA NOT REPORTED None    Amorphous, UA NOT REPORTED None    Other Observations UA NOT REPORTED NOT REQ. Yeast, UA NOT REPORTED None   Chloride, Random Urine    Collection Time: 07/09/20  4:03 PM   Result Value Ref Range    Chloride, Ur <20 mmol/L   Potassium, urine, random    Collection Time: 07/09/20  4:03 PM   Result Value Ref Range    Potassium, Ur 32.4 mmol/L   Sodium, urine, random    Collection Time: 07/09/20  4:03 PM   Result Value Ref Range    Sodium,Ur <20 mmol/L   Protein / creatinine ratio, urine    Collection Time: 07/09/20  4:03 PM   Result Value Ref Range    Total Protein, Urine 95 mg/dL    Creatinine, Ur 726.8 (H) 39.0 - 259.0 mg/dL    Urine Total Protein Creatinine Ratio 0.13 0.00 - 0.20   SPECIMEN REJECTION    Collection Time: 07/09/20  4:50 PM   Result Value Ref Range    Specimen Source . BLOOD     Ordered Test TROPI, TSH, K     Reason for Rejection Unable to perform testing: Specimen hemolyzed.      - NOT REPORTED    Potassium    Collection Time: 07/09/20  5:17 PM   Result Value Ref Range    Potassium 3.6 (L) 3.7 - 5.3 mmol/L   Troponin    Collection Time: 07/09/20  5:17 PM   Result Value Ref Range    Troponin, High Sensitivity 36 (H) 0 - 22 ng/L    Troponin T NOT REPORTED <0.03 ng/mL    Troponin Interp NOT REPORTED    TSH without Reflex    Collection Time: 07/09/20  5:17 PM   Result Value

## 2020-07-10 NOTE — PROGRESS NOTES
Physical Therapy  DATE: 7/10/2020    NAME: Toya Gonzalez  MRN: [id number]   : 1955    Patient not seen this date for Physical Therapy due to:  [] Blood transfusion in progress  [] Hemodialysis  []  Patient Declined  [] Spine Precautions   [] Strict Bedrest  [] Surgery/ Procedure  [] Testing      [x] Other--critically low potassium (2.7); check status         [] PT being discontinued at this time. Patient independent. No further needs. [] PT being discontinued at this time as the patient has been transferred to palliative care. No further needs.     Olivia Fernando, PT

## 2020-07-10 NOTE — CARE COORDINATION
Case Management Initial Discharge Plan  Radha Sharif,             Met with:patient to discuss discharge plans. Information verified: address, contacts, phone number, , insurance Yes    Emergency Contact/Next of Kin name & number: Prema Hernandez [phone], Penny dominique [phone]    PCP: Abbie Rizo, CESAR Ramirez CNP  Date of last visit: less than 3 months    Insurance Provider: Michelle Diego    Discharge Planning    Living Arrangements:  Family Members, Children, Spouse/Significant Other   Support Systems:  Spouse/Significant Other, Family Members, Children, Friends/Neighbors    Home has 1 stories  3 stairs to climb to get into front door, no stairs to climb to reach second floor  Location of bedroom/bathroom in home main    Patient able to perform ADL's:Independent    Current Services (outpatient & in home) none  DME equipment: none  DME provider: none    Receiving oral anticoagulation therapy?  none    If indicated:   Physician managing anticoagulation treatment: none  Where does patient obtain lab work for ATC treatment? none      Potential Assistance Needed:  Meals On Wheels    Patient agreeable to home care: No  [place] of choice provided:  has had HC in past    Prior SNF/Rehab Placement and Facility: none  Agreeable to SNF/Rehab: No  [place] of choice provided: n/a     Evaluation: n/a    Expected Discharge date:       Patient expects to be discharged to:  home  Follow Up Appointment: Best Day/ Time:      Transportation provider: [place]  Transportation arrangements needed for discharge: No    Readmission Risk              Risk of Unplanned Readmission:        18             Does patient have a readmission risk score greater than 14?: Yes  If yes, follow-up appointment must be made within 7 days of discharge.      Goals of Care: self care      Discharge Plan: home independent lives in mother in laws house          Electronically signed by Rajni Lowe RN on 7/10/20 at 4:59 PM EDT

## 2020-07-10 NOTE — PROGRESS NOTES
[facility]  Internal Medicine Teaching Residency Program  Inpatient Daily Progress Note  ______________________________________________________________________________    Patient: Sonny Cárdenas  YOB: 1955   VIU:[id number]    Acct: [de-identified]     Room: 2024/2024-01  Admit date: 7/9/2020  Today's date: 07/10/20  Number of days in the hospital: 1    SUBJECTIVE   Admitting Diagnosis: Increasing Creatinine  CC: Fatigue  Pt examined at bedside. Chart & results reviewed. No acute episodes overnight  Pt is stable and afebrile  Pt has not had a bowel movement in 2 days  Pt has no shortness of breath  Pt reports improvement in his nausea and vomiting    ROS:  Constitutional:  negative for chills, fevers, sweats  Respiratory:  negative for cough, dyspnea on exertion, hemoptysis, shortness of breath, wheezing  Cardiovascular:  negative for chest pain, chest pressure/discomfort, lower extremity edema, palpitations  Gastrointestinal:  negative for abdominal pain, constipation, diarrhea, nausea, vomiting  Neurological:  negative for dizziness, headache  BRIEF HISTORY     The patient is a pleasant 76 y.o. male presents with a chief complaint of fatigue. For the past week the patient has been experiencing nausea and vomiting and subsequently has decreased his oral intake. The nausea and vomiting has been especially apparent during the last 5 days and for the past 2 days he has had trouble standing and dizziness. Patient says he just wants to sleep; he does report a mild cough. He has decreased urine output and appears hypotensive on exam.  He denies any chest pain, shortness of breath, fever, chills, leg pain, and leg swelling. He also denies being in close contact with anyone who has been sick.       OBJECTIVE     Vital Signs:  BP (!) 95/50   Pulse 61   Temp 98.6 °F (37 °C) (Oral)   Resp 14   Ht 5' 9\" (1.753 m)   Wt 190 lb 14.7 oz (86.6 kg)   SpO2 92% BMI 28.19 kg/m²     Temp (24hrs), Av.5 °F (36.9 °C), Min:98.1 °F (36.7 °C), Max:98.8 °F (37.1 °C)    In: 4246   Out: 1600 [Urine:1600]    Physical Exam:  Constitutional: This is a well developed, well nourished, 25-29.9 - Overweight 76y.o. year old male who is alert, oriented, cooperative and in no apparent distress. Respiratory: Breath sounds bilaterally were clear to auscultation  Cardiovascular: Regular without murmur  Abdomen: Soft and nontender    Extremities:  No lower extremity edema  Neurological/Psychiatric: The patient's general behavior, level of consciousness, thought content and emotional status is normal.        Medications:  Scheduled Medications:    potassium chloride  40 mEq Oral BID WC    sodium chloride flush  10 mL Intravenous 2 times per day    heparin (porcine)  5,000 Units Subcutaneous 3 times per day    nicotine  1 patch Transdermal Daily    atorvastatin  80 mg Oral Nightly     Continuous Infusions:    sodium chloride 100 mL/hr at 20 2248     PRN Medicationssodium chloride flush, 10 mL, PRN  acetaminophen, 650 mg, Q6H PRN    Or  acetaminophen, 650 mg, Q6H PRN  polyethylene glycol, 17 g, Daily PRN  promethazine, 12.5 mg, Q6H PRN    Or  ondansetron, 4 mg, Q6H PRN        Diagnostic Labs:  CBC:   Recent Labs     20  1434 07/10/20  0506   WBC 9.6 6.5   RBC 5.01 3.71*   HGB 15.3 11.4*   HCT 44.6 34.0*   MCV 89.0 91.6   RDW 14.7* 14.9*    178     BMP:   Recent Labs     20  1434 20  1444 20  1717 07/10/20  0506     --   --  142   K 3.5*  --  3.6* 2.7*   CL 92*  --   --  105   CO2 20  --   --  19*   BUN 78*  --   --  75*   CREATININE 13.73* 13.02*  --  8.63*     BNP: No results for input(s): BNP in the last 72 hours. PT/INR:   Recent Labs     20  1434   PROTIME 10.6   INR 1.0     APTT: No results for input(s): APTT in the last 72 hours. CARDIAC ENZYMES: No results for input(s): CKMB, CKMBINDEX, TROPONINI in the last 72 hours.     Invalid

## 2020-07-10 NOTE — PROGRESS NOTES
Physical Therapy  DATE: 7/10/2020    NAME: Flaquito Squires  MRN: [id number]   : 1955    Patient not seen this date for Physical Therapy due to:  [] Blood transfusion in progress  [] Hemodialysis  []  Patient Declined  [] Spine Precautions   [] Strict Bedrest  [] Surgery/ Procedure  [] Testing      [] Other        [x] PT being discontinued at this time. Patient independent. No further needs. Discussed with pt--he states he's getting up to the bathroom independently and denies PT needs. Encouraged pt to be OOB/up to the chair throughout the day to prevent the complications of bedrest.  Defer PT eval d/t pt independence. [] PT being discontinued at this time as the patient has been transferred to palliative care. No further needs.     Mimi Hassan, PT

## 2020-07-10 NOTE — CONSULTS
Nephrology Consult Note    Reason for Consult:  Acute renal injury   Requesting Physician:  Jean Healy    Chief Complaint:  Dizziness and lightheadedness. Fatigue     History Obtained From:  patient, electronic medical record    History of Present Illness: This is a 76 y.o. male who presented to the hospital for evaluation of above-mentioned symptoms. .  Patient tells me that he is not been feeling well for the last 3 to 4 days. He has had intermittent nausea, had 1 or 2 episodes of emesis and has noted significant anorexia with decreased appetite. He denied any history of fever, chills, cough, bloody bowel movements or hematuria. Symptoms progressed even though he was trying to keep up with his fluids to the point that he started getting dizzy when he would stand. Patient tells me he was been living with his girlfriend. Finally he got weak to the point that he decided to come to the emergency room. Quite hypotensive in the emergency room with blood pressures in the 80s. He did respond to fluids. Patient denied any episodes of palpitations, chest pain, leg swelling, abdominal pain. Patient denied any sick contacts. Work-up in the emergency room showed that his serum creatinine was markedly elevated at 13.7 with a potassium of 3.7 and normal sodium level. His BUN was 78. He was hydrated overnight, creatinine is down to 8.6. His potassium is low at 2.7 which is being replaced. Mag level is okay at 2.1. Urine studies showed urinary chloride to be less than 20, urinary sodium to be less than 20. Looking at his labs, his baseline serum creatinine is around 1.0-1.1. Home meds showed that he was not on Lasix and lisinopril that he was taking pretty much up until the point of hospitalization. Pt denies any hx of heavy or prolonged NSAID use. There is no history of blood or bone marrow disorders. There is no hx of jaundice or hepatitis or sexually transmitted disease.  Pt has no service: Not on file     Active member of club or organization: Not on file     Attends meetings of clubs or organizations: Not on file     Relationship status: Not on file    Intimate partner violence     Fear of current or ex partner: Not on file     Emotionally abused: Not on file     Physically abused: Not on file     Forced sexual activity: Not on file   Other Topics Concern    Not on file   Social History Narrative    Not on file       Family History:   History reviewed. No pertinent family history. Review of Systems:    Constitutional: Fatigue, lightheadedness, dizziness, decreased urine output. HEENT:  No headache, otalgia, itchy eyes, nasal discharge or sore throat. Cardiac:  No chest pain, dyspnea, orthopnea or PND. Chest:              No cough, phlegm or wheezing. Abdomen:  No abdominal pain, nausea or vomiting. Neuro:  No focal weakness, abnormal movements orseizure like activity. Skin:   No rashes, no itching. :   No hematuria, no pyuria, no dysuria, no flank pain. Extremities:  No swelling or joint pains.       Objective:  CURRENT TEMPERATURE:  Temp: 98.6 °F (37 °C)  MAXIMUM TEMPERATURE OVER 24HRS:  Temp (24hrs), Av.5 °F (36.9 °C), Min:98.1 °F (36.7 °C), Max:98.8 °F (37.1 °C)    CURRENT RESPIRATORY RATE:  Resp: 14  CURRENT PULSE:  Pulse: 61  CURRENT BLOOD PRESSURE:  BP: (!) 95/50  24HR BLOOD PRESSURE RANGE:  Systolic (81FTP), WER:888 , Min:75 , JZI:686   ; Diastolic (70LGC), IIQ:42, Min:40, Max:64    24HR INTAKE/OUTPUT:      Intake/Output Summary (Last 24 hours) at 7/10/2020 1023  Last data filed at 7/10/2020 9213  Gross per 24 hour   Intake 4126 ml   Output 400 ml   Net 3726 ml     Patient Vitals for the past 96 hrs (Last 3 readings):   Weight   20 2251 190 lb 14.7 oz (86.6 kg)   20 1424 190 lb (86.2 kg)     Physical Exam:  General appearance:Awake, alert, in no acute distress  Skin: warm and dry, no rash or erythema  Eyes: conjunctivae normal and sclera anicteric  ENT: :no thrush no pharyngeal congestion    Neck: no carotid bruit ,no  JVD,no carotid Lymphadenopathy, noThyromegaly   Pulmonary: + wheezing  No rales heard. Cardiovascular: Normal S1 & S2,  No S3 or  S4, no Pericardial Rub no Murmur   Abdomen: soft nontender, bowel sounds present, no organomegaly,  no ascites  Extremities:no cyanosis, clubbing or edema    Labs:   CBC:  Recent Labs     07/09/20  1434 07/10/20  0506   WBC 9.6 6.5   RBC 5.01 3.71*   HGB 15.3 11.4*   HCT 44.6 34.0*   MCV 89.0 91.6   MCH 30.5 30.7   MCHC 34.3 33.5   RDW 14.7* 14.9*    178   MPV 9.8 9.7      BMP:   Recent Labs     07/09/20  1434 07/09/20  1444 07/09/20  1717 07/10/20  0506     --   --  142   K 3.5*  --  3.6* 2.7*   CL 92*  --   --  105   CO2 20  --   --  19*   BUN 78*  --   --  75*   CREATININE 13.73* 13.02*  --  8.63*   GLUCOSE 120*  --   --  108*   CALCIUM 8.4*  --   --  6.5*        Magnesium:   Recent Labs     07/10/20  0506   MG 2.1     Albumin:   Recent Labs     07/09/20  1434   LABALBU 4.0       IRON:    Lab Results   Component Value Date    IRON 282 01/13/2017   Urine Sodium:    Lab Results   Component Value Date    MARY JO <20 07/09/2020      Urine Creatinine:    Lab Results   Component Value Date    LABCREA 726.8 07/09/2020     Urinalysis:  U/A:   Lab Results   Component Value Date    NITRU NEGATIVE 07/09/2020    COLORU DARK YELLOW 07/09/2020    PHUR 5.0 07/09/2020    WBCUA 10 TO 20 07/09/2020    RBCUA 5 TO 10 07/09/2020    MUCUS 1+ 07/09/2020    TRICHOMONAS NOT REPORTED 07/09/2020    YEAST NOT REPORTED 07/09/2020    BACTERIA FEW 07/09/2020    SPECGRAV 1.027 07/09/2020    LEUKOCYTESUR TRACE 07/09/2020    UROBILINOGEN Normal 07/09/2020    BILIRUBINUR NEGATIVE  Verified by ictotest. 07/09/2020    GLUCOSEU NEGATIVE 07/09/2020    KETUA TRACE 07/09/2020    AMORPHOUS NOT REPORTED 07/09/2020         Assessment:  1.  LEVI secondary to ischemic ATN stemming from volume depletion, decreased oral intake while he was still taking diuretics and lisinopril at home. Reactant was 13 on admission, down to 8.6 today. He is not overtly uremic  2. Possible urinary tract infection. 3. HYPERTENSION. Patient was quite hypotensive secondary to hypovolemia  4. Patient has history of coronary artery disease. Last echo showed EF of around 45%. Plan:  1. Will Check Renal Ultrasound to r/o element of obstruction and to assess the kidney size/echotexture. 2. Urine testing including Urinalysis, Urine sodium, , Urine protein and creatinine . Will check urinary eosinophils as well. 3. Will Order serum and urine protein electrophoresis, light chain ratio  to r/o occult paraprotein disease. 4. Will order  SYLVIA, ANCA, Complement levels. 5.  Continue with IV fluids. 6.  Await final culture reports and results. Consider repeat cardiac echo  7. Encouraging improvement in his serum creatinine with hydration, holding dialysis and will follow up on his labs tomorrow. 8.  Replace potassium, check magnesium and replace if needed  Thank you for the consultation. Please do not hesitate to call with questions.     Electronically signed by Dony Howard MD on 7/10/2020 at 10:23 AM

## 2020-07-10 NOTE — FLOWSHEET NOTE
Assessment: Patient was sleepy and slightly confused. Patient keep telling  he had a question, but he  Forgot. Intervention:  tried to comfort him and let him know it was ok that he could not remember.  nurtured hope and prayed with the patient. Outcome: Patient was thankful for the prayer. 07/10/20 1337   Encounter Summary   Services provided to: Patient   Referral/Consult From: [employer]   Support System Unknown   Continue Visiting   (07/10/2020)   Complexity of Encounter Low   Length of Encounter 15 minutes   Routine   Type Initial   Assessment Approachable; Loneliness   Intervention Active listening;Prayer   Outcome Comfort

## 2020-07-10 NOTE — PROGRESS NOTES
Nephrology contacted regarding critical lab, potassium 2.7. New orders for ionized calcium and potassium labs. Writer instructed to administer 30mEq potassium PO if potassium results low again, then recheck potassium lab after 3 hours past administration. Writer instructed to modify BMP lab order to q6h. Will continue to monitor and will update day shift nurse at shift change.      Electronically signed by Lolly Francis RN on 7/10/2020 at 6:32 AM

## 2020-07-11 LAB
ABSOLUTE EOS #: 0.07 K/UL (ref 0–0.44)
ABSOLUTE IMMATURE GRANULOCYTE: <0.03 K/UL (ref 0–0.3)
ABSOLUTE LYMPH #: 0.88 K/UL (ref 1.1–3.7)
ABSOLUTE MONO #: 0.71 K/UL (ref 0.1–1.2)
ANION GAP SERPL CALCULATED.3IONS-SCNC: 12 MMOL/L (ref 9–17)
ANION GAP SERPL CALCULATED.3IONS-SCNC: 14 MMOL/L (ref 9–17)
ANION GAP SERPL CALCULATED.3IONS-SCNC: 14 MMOL/L (ref 9–17)
ANION GAP SERPL CALCULATED.3IONS-SCNC: 9 MMOL/L (ref 9–17)
BASOPHILS # BLD: 0 % (ref 0–2)
BASOPHILS ABSOLUTE: 0.03 K/UL (ref 0–0.2)
BUN BLDV-MCNC: 42 MG/DL (ref 8–23)
BUN BLDV-MCNC: 49 MG/DL (ref 8–23)
BUN BLDV-MCNC: 55 MG/DL (ref 8–23)
BUN BLDV-MCNC: 62 MG/DL (ref 8–23)
BUN/CREAT BLD: ABNORMAL (ref 9–20)
CALCIUM IONIZED: 1.11 MMOL/L (ref 1.13–1.33)
CALCIUM SERPL-MCNC: 7.5 MG/DL (ref 8.6–10.4)
CALCIUM SERPL-MCNC: 7.6 MG/DL (ref 8.6–10.4)
CALCIUM SERPL-MCNC: 7.9 MG/DL (ref 8.6–10.4)
CALCIUM SERPL-MCNC: 8.4 MG/DL (ref 8.6–10.4)
CHLORIDE BLD-SCNC: 110 MMOL/L (ref 98–107)
CHLORIDE BLD-SCNC: 110 MMOL/L (ref 98–107)
CHLORIDE BLD-SCNC: 111 MMOL/L (ref 98–107)
CHLORIDE BLD-SCNC: 114 MMOL/L (ref 98–107)
CO2: 19 MMOL/L (ref 20–31)
CO2: 20 MMOL/L (ref 20–31)
COMPLEMENT C3: 108 MG/DL (ref 90–180)
COMPLEMENT C4: 37 MG/DL (ref 10–40)
CREAT SERPL-MCNC: 2.06 MG/DL (ref 0.7–1.2)
CREAT SERPL-MCNC: 2.67 MG/DL (ref 0.7–1.2)
CREAT SERPL-MCNC: 3.17 MG/DL (ref 0.7–1.2)
CREAT SERPL-MCNC: 4.21 MG/DL (ref 0.7–1.2)
DIFFERENTIAL TYPE: ABNORMAL
EOSINOPHILS RELATIVE PERCENT: 1 % (ref 1–4)
FREE KAPPA/LAMBDA RATIO: 2.04 (ref 0.26–1.65)
GFR AFRICAN AMERICAN: 17 ML/MIN
GFR AFRICAN AMERICAN: 24 ML/MIN
GFR AFRICAN AMERICAN: 29 ML/MIN
GFR AFRICAN AMERICAN: 39 ML/MIN
GFR NON-AFRICAN AMERICAN: 14 ML/MIN
GFR NON-AFRICAN AMERICAN: 20 ML/MIN
GFR NON-AFRICAN AMERICAN: 24 ML/MIN
GFR NON-AFRICAN AMERICAN: 32 ML/MIN
GFR SERPL CREATININE-BSD FRML MDRD: ABNORMAL ML/MIN/{1.73_M2}
GLUCOSE BLD-MCNC: 112 MG/DL (ref 70–99)
GLUCOSE BLD-MCNC: 115 MG/DL (ref 70–99)
GLUCOSE BLD-MCNC: 130 MG/DL (ref 70–99)
GLUCOSE BLD-MCNC: 132 MG/DL (ref 70–99)
HCT VFR BLD CALC: 35.1 % (ref 40.7–50.3)
HEMOGLOBIN: 11.9 G/DL (ref 13–17)
IMMATURE GRANULOCYTES: 0 %
KAPPA FREE LIGHT CHAINS QNT: 10.2 MG/DL (ref 0.37–1.94)
LAMBDA FREE LIGHT CHAINS QNT: 4.99 MG/DL (ref 0.57–2.63)
LYMPHOCYTES # BLD: 13 % (ref 24–43)
MAGNESIUM: 1.8 MG/DL (ref 1.6–2.6)
MAGNESIUM: 1.9 MG/DL (ref 1.6–2.6)
MAGNESIUM: 2.1 MG/DL (ref 1.6–2.6)
MCH RBC QN AUTO: 30.8 PG (ref 25.2–33.5)
MCHC RBC AUTO-ENTMCNC: 33.9 G/DL (ref 28.4–34.8)
MCV RBC AUTO: 90.9 FL (ref 82.6–102.9)
MONOCYTES # BLD: 10 % (ref 3–12)
MYOGLOBIN: 141 NG/ML (ref 28–72)
NRBC AUTOMATED: 0 PER 100 WBC
PDW BLD-RTO: 14.6 % (ref 11.8–14.4)
PHOSPHORUS: 2.2 MG/DL (ref 2.5–4.5)
PLATELET # BLD: 184 K/UL (ref 138–453)
PLATELET ESTIMATE: ABNORMAL
PMV BLD AUTO: 9.6 FL (ref 8.1–13.5)
POTASSIUM SERPL-SCNC: 3.2 MMOL/L (ref 3.7–5.3)
POTASSIUM SERPL-SCNC: 3.4 MMOL/L (ref 3.7–5.3)
POTASSIUM SERPL-SCNC: 3.5 MMOL/L (ref 3.7–5.3)
POTASSIUM SERPL-SCNC: 3.6 MMOL/L (ref 3.7–5.3)
RBC # BLD: 3.86 M/UL (ref 4.21–5.77)
RBC # BLD: ABNORMAL 10*6/UL
SEG NEUTROPHILS: 76 % (ref 36–65)
SEGMENTED NEUTROPHILS ABSOLUTE COUNT: 5.3 K/UL (ref 1.5–8.1)
SODIUM BLD-SCNC: 140 MMOL/L (ref 135–144)
SODIUM BLD-SCNC: 141 MMOL/L (ref 135–144)
SODIUM BLD-SCNC: 143 MMOL/L (ref 135–144)
SODIUM BLD-SCNC: 147 MMOL/L (ref 135–144)
TOTAL CK: 282 U/L (ref 39–308)
WBC # BLD: 7 K/UL (ref 3.5–11.3)
WBC # BLD: ABNORMAL 10*3/UL

## 2020-07-11 PROCEDURE — 99232 SBSQ HOSP IP/OBS MODERATE 35: CPT | Performed by: INTERNAL MEDICINE

## 2020-07-11 PROCEDURE — 83516 IMMUNOASSAY NONANTIBODY: CPT

## 2020-07-11 PROCEDURE — 84100 ASSAY OF PHOSPHORUS: CPT

## 2020-07-11 PROCEDURE — 83735 ASSAY OF MAGNESIUM: CPT

## 2020-07-11 PROCEDURE — 82330 ASSAY OF CALCIUM: CPT

## 2020-07-11 PROCEDURE — 36415 COLL VENOUS BLD VENIPUNCTURE: CPT

## 2020-07-11 PROCEDURE — 80048 BASIC METABOLIC PNL TOTAL CA: CPT

## 2020-07-11 PROCEDURE — 2060000000 HC ICU INTERMEDIATE R&B

## 2020-07-11 PROCEDURE — 2580000003 HC RX 258: Performed by: STUDENT IN AN ORGANIZED HEALTH CARE EDUCATION/TRAINING PROGRAM

## 2020-07-11 PROCEDURE — 6360000002 HC RX W HCPCS: Performed by: STUDENT IN AN ORGANIZED HEALTH CARE EDUCATION/TRAINING PROGRAM

## 2020-07-11 PROCEDURE — 86038 ANTINUCLEAR ANTIBODIES: CPT

## 2020-07-11 PROCEDURE — 2580000003 HC RX 258: Performed by: INTERNAL MEDICINE

## 2020-07-11 PROCEDURE — 85025 COMPLETE CBC W/AUTO DIFF WBC: CPT

## 2020-07-11 PROCEDURE — 6370000000 HC RX 637 (ALT 250 FOR IP): Performed by: NURSE PRACTITIONER

## 2020-07-11 PROCEDURE — 6370000000 HC RX 637 (ALT 250 FOR IP): Performed by: STUDENT IN AN ORGANIZED HEALTH CARE EDUCATION/TRAINING PROGRAM

## 2020-07-11 PROCEDURE — 82550 ASSAY OF CK (CPK): CPT

## 2020-07-11 PROCEDURE — 6360000002 HC RX W HCPCS: Performed by: INTERNAL MEDICINE

## 2020-07-11 PROCEDURE — 83874 ASSAY OF MYOGLOBIN: CPT

## 2020-07-11 RX ORDER — POTASSIUM CHLORIDE 7.45 MG/ML
10 INJECTION INTRAVENOUS PRN
Status: DISCONTINUED | OUTPATIENT
Start: 2020-07-11 | End: 2020-07-12 | Stop reason: HOSPADM

## 2020-07-11 RX ORDER — SODIUM CHLORIDE 450 MG/100ML
INJECTION, SOLUTION INTRAVENOUS CONTINUOUS
Status: DISCONTINUED | OUTPATIENT
Start: 2020-07-11 | End: 2020-07-12

## 2020-07-11 RX ORDER — POTASSIUM CHLORIDE 20 MEQ/1
40 TABLET, EXTENDED RELEASE ORAL PRN
Status: DISCONTINUED | OUTPATIENT
Start: 2020-07-11 | End: 2020-07-12 | Stop reason: HOSPADM

## 2020-07-11 RX ORDER — CALCIUM GLUCONATE 20 MG/ML
2 INJECTION, SOLUTION INTRAVENOUS ONCE
Status: COMPLETED | OUTPATIENT
Start: 2020-07-11 | End: 2020-07-11

## 2020-07-11 RX ORDER — POTASSIUM CHLORIDE 20 MEQ/1
40 TABLET, EXTENDED RELEASE ORAL ONCE
Status: DISCONTINUED | OUTPATIENT
Start: 2020-07-11 | End: 2020-07-11 | Stop reason: ALTCHOICE

## 2020-07-11 RX ADMIN — HEPARIN SODIUM 5000 UNITS: 5000 INJECTION INTRAVENOUS; SUBCUTANEOUS at 13:26

## 2020-07-11 RX ADMIN — CALCIUM GLUCONATE 2 G: 20 INJECTION, SOLUTION INTRAVENOUS at 14:15

## 2020-07-11 RX ADMIN — FLUTICASONE PROPIONATE 2 SPRAY: 50 SPRAY, METERED NASAL at 08:55

## 2020-07-11 RX ADMIN — SODIUM CHLORIDE, PRESERVATIVE FREE 10 ML: 5 INJECTION INTRAVENOUS at 22:36

## 2020-07-11 RX ADMIN — SODIUM CHLORIDE: 9 INJECTION, SOLUTION INTRAVENOUS at 07:02

## 2020-07-11 RX ADMIN — HEPARIN SODIUM 5000 UNITS: 5000 INJECTION INTRAVENOUS; SUBCUTANEOUS at 22:49

## 2020-07-11 RX ADMIN — SODIUM CHLORIDE: 4.5 INJECTION, SOLUTION INTRAVENOUS at 09:00

## 2020-07-11 RX ADMIN — POTASSIUM CHLORIDE 40 MEQ: 1500 TABLET, EXTENDED RELEASE ORAL at 07:03

## 2020-07-11 RX ADMIN — CEFTRIAXONE SODIUM 1 G: 1 INJECTION, POWDER, FOR SOLUTION INTRAMUSCULAR; INTRAVENOUS at 22:44

## 2020-07-11 RX ADMIN — HEPARIN SODIUM 5000 UNITS: 5000 INJECTION INTRAVENOUS; SUBCUTANEOUS at 07:05

## 2020-07-11 NOTE — PROGRESS NOTES
07/10/20  0506 07/11/20  0537   WBC 9.6 6.5 7.0   RBC 5.01 3.71* 3.86*   HGB 15.3 11.4* 11.9*   HCT 44.6 34.0* 35.1*   MCV 89.0 91.6 90.9   RDW 14.7* 14.9* 14.6*    178 184     BMP:   Recent Labs     07/10/20  0506 07/10/20  1356 07/10/20  2353 07/11/20  0537     --  143 147*   K 2.7* 3.7 3.2* 3.4*     --  110* 114*   CO2 19*  --  19* 19*   PHOS  --   --   --  2.2*   BUN 75*  --  62* 55*   CREATININE 8.63*  --  4.21* 3.17*     BNP: No results for input(s): BNP in the last 72 hours. PT/INR:   Recent Labs     07/09/20  1434   PROTIME 10.6   INR 1.0     APTT: No results for input(s): APTT in the last 72 hours. CARDIAC ENZYMES: No results for input(s): CKMB, CKMBINDEX, TROPONINI in the last 72 hours. Invalid input(s): CKTOTAL;3  FASTING LIPID PANEL:  Lab Results   Component Value Date    CHOL 178 01/26/2020    HDL 30 (L) 01/26/2020    TRIG 82 01/26/2020     LIVER PROFILE:   Recent Labs     07/09/20  1434   AST 17   ALT 10   BILITOT 0.68   ALKPHOS 95      MICROBIOLOGY:   Lab Results   Component Value Date/Time    CULTURE NO GROWTH 2 DAYS 07/09/2020 06:46 PM       Imaging:    Us Renal Complete    Result Date: 7/10/2020  Limited, unremarkable ultrasound of the kidneys and urinary bladder. No hydronephrosis. Xr Chest Portable    Result Date: 7/9/2020  No evidence of acute cardiopulmonary process. ASSESSMENT & PLAN     ASSESSMENT / PLAN:     Dehydration  Plan: I/v fluids           Replace electrolytes. Monitor I's and O's. LEVI (acute kidney injury) ([facility] [address]  Plan: Resolving Cr down to 2.67 from 8.63 on admission. Monitor labs. Rhabdomyolysis:  Plan: Hydration           Myoglobin down to 141 today,  and K 3.6. Possible UTI:  Plan: UA show trace leukocyte esterase and few bacteria. Urine culture show no growth. Pt on Rocephin 1 g. Benign essential HTN  Plan:  BP stable 128/78.             Antihypertensives held due to volume depletion. DVT ppx : Heparin. PT/OT: Consulted. Discharge Planning : As per Nephrology recommendations. Arianne Martines MD  Internal Medicine Resident, PGY-1  [address], [place]  7/11/2020, 12:00 PM        Attending Physician Statement  I have discussed the case, including pertinent history and exam findings with the resident and the team.  I have seen and examined the patient and the key elements of the encounter have been performed by me. I agree with the assessment, plan and orders as documented by the resident.       Doing much better today, clinically improved  Renal function is improving  Replace potassium  Monitor renal function  Change IV fluids to half-normal saline  Follow-up on echocardiogram         Ayde Gardner MD  Attending Physician, Internal Medicine Service    Internal Medicine Residency Program  7/11/2020, 1:26 PM

## 2020-07-11 NOTE — PROGRESS NOTES
Renal Progress Note    Patient :  Sonny Cárdenas; 76 y.o. MRN# [id number]  Location:    Attending:  Coral Morse MD  Admit Date:  2020   Hospital Day: 2    Subjective   Patient was seen and examined. No acute events overnight. Vital signs stable   I/O over last 24 hours - 1764/2650 = -886 mL. Overall positive 2.8 L since admission. Labs reviewed. Admitted with creatinine of 13.73, yesterday was 8.63, now 3.17. Continues to trend down. Na 147; K 3.4; Cl 114; CO2 19; BUN 55; glucose 132; Ca 7.5; Hgb 11.9. Immunofixation pending. Elevated free light chain ratio. C3 108 C4 37.   UA noted and reviewed. Dark and cloud with trace protein. 1+ mucous, few bacteria, and trace leukocyte esterase. Urine Cl <20; urine creatinine 726.8; Urine K 32.4; Urine sodium less than 20, total urine protein 95, no yeast in urine. Unremarkable renal ultrasound.     Objective     VS: BP (!) 122/56   Pulse 74   Temp 98.6 °F (37 °C) (Oral)   Resp 18   Ht 5' 9\" (1.753 m)   Wt 185 lb 12.8 oz (84.3 kg)   SpO2 95%   BMI 27.44 kg/m²   MAXIMUM TEMPERATURE OVER 24HRS:  Temp (24hrs), Av.7 °F (37.1 °C), Min:98.4 °F (36.9 °C), Max:99.2 °F (37.3 °C)    24HR BLOOD PRESSURE RANGE:  Systolic (75EKI), ECS:949 , Min:106 , FVC:246   ; Diastolic (34QBI), WVU:75, Min:40, Max:74    24HR INTAKE/OUTPUT:      Intake/Output Summary (Last 24 hours) at 2020 0844  Last data filed at 2020 0600  Gross per 24 hour   Intake 1764 ml   Output 2650 ml   Net -886 ml     WEIGHT :  Patient Vitals for the past 96 hrs (Last 3 readings):   Weight   20 0441 185 lb 12.8 oz (84.3 kg)   20 2251 190 lb 14.7 oz (86.6 kg)   20 1424 190 lb (86.2 kg)       Current Medications:     Scheduled Meds:    cefTRIAXone (ROCEPHIN) IV  1 g Intravenous Q24H    fluticasone  2 spray Each Nostril Daily    sodium chloride flush  10 mL Intravenous 2 times per day    heparin (porcine)  5,000 Units Subcutaneous 3 times per day    nicotine  1 patch Transdermal Daily    [Held by provider] atorvastatin  80 mg Oral Nightly     Continuous Infusions:    sodium chloride         Physical Examination:     General:  AAO x 3, speaking in full sentences, no accessory muscle use. Chest:   Bilateral vesicular breath sounds, no rales or wheezes. Cardiac:  S1 S2 RR, no murmurs, gallops or rubs, JVP not raised. Abdomen: Soft, non-tender, non distended, BS audible. SKIN:  No rashes, good skin turgor. Extremities:  No edema, no clubbing, No cyanosis  Neuro:  AAO x 3, No FND.      Labs:       Recent Labs     07/09/20  1434 07/10/20  0506 07/11/20  0537   WBC 9.6 6.5 7.0   RBC 5.01 3.71* 3.86*   HGB 15.3 11.4* 11.9*   HCT 44.6 34.0* 35.1*   MCV 89.0 91.6 90.9   MCH 30.5 30.7 30.8   MCHC 34.3 33.5 33.9   RDW 14.7* 14.9* 14.6*    178 184   MPV 9.8 9.7 9.6      BMP:   Recent Labs     07/10/20  0506 07/10/20  1356 07/10/20  2353 07/11/20  0537     --  143 147*   K 2.7* 3.7 3.2* 3.4*     --  110* 114*   CO2 19*  --  19* 19*   BUN 75*  --  62* 55*   CREATININE 8.63*  --  4.21* 3.17*   GLUCOSE 108*  --  112* 132*   CALCIUM 6.5*  --  7.6* 7.5*      Phosphorus:     Recent Labs     07/11/20  0537   PHOS 2.2*     Magnesium:    Recent Labs     07/10/20  0506 07/10/20  2353 07/11/20  0537   MG 2.1 2.1 1.9     Albumin:    Recent Labs     07/09/20  1434   LABALBU 4.0     BNP:      Lab Results   Component Value Date    BNP 25 09/20/2011     Urinalysis/Chemistries:      Lab Results   Component Value Date    NITRU NEGATIVE 07/09/2020    COLORU DARK YELLOW 07/09/2020    PHUR 5.0 07/09/2020    WBCUA 10 TO 20 07/09/2020    RBCUA 5 TO 10 07/09/2020    MUCUS 1+ 07/09/2020    TRICHOMONAS NOT REPORTED 07/09/2020    YEAST NOT REPORTED 07/09/2020    BACTERIA FEW 07/09/2020    SPECGRAV 1.027 07/09/2020    LEUKOCYTESUR TRACE 07/09/2020    UROBILINOGEN Normal 07/09/2020    BILIRUBINUR NEGATIVE  Verified by ictotest. 07/09/2020    GLUCOSEU NEGATIVE 07/09/2020    KETUA TRACE 07/09/2020    AMORPHOUS NOT REPORTED 07/09/2020       Radiology:     CXR: Reviewed as available. Assessment:     1. LEVI secondary to ischemic ATN stemming from volume depletion, decreased oral intake while he was still taking diuretics and lisinopril at home. Reactant was 13 on admission, down to 8.6 today. Now 3.17  2. Possible urinary tract infection - continues on Rocephin. 3. HYPERTENSION. Patient was quite hypotensive secondary to hypovolemia  4. Patient has history of coronary artery disease. Last echo showed EF of around 45%. Plan:   1. Continue 0.45% NS IVF. 2. Replace electrolytes per protocol. 3. Labs in am.   4. Avoid nephrotoxic agents and IV contrast if at all possible. 5. Immunofixation pending, but elevated free light chain ratio  6. Following     Nutrition   Renal Diet/TF      Electronically signed by Angie Young CNP, CESAR - CNP on 7/11/2020 at 8:44 AM   [facility]. Attending Physician Statement  I have discussed the care of Shama Delarosa, including pertinent history and exam findings with the CNP. I have reviewed the key elements of all parts of the encounter with the CNP. I have seen and examined the patient. I agree with the assessment and plan and status of the problem list as documented.       Bhavik Rodarte MD  Nephrology Attending Physician  [facility]

## 2020-07-11 NOTE — CONSULTS
[facility] Cardiology Cardiology    Consult / H&P               Today's Date: 7/11/2020  Patient Name: Micheal Shook  Date of admission: 7/9/2020  2:07 PM  Patient's age: 76 y.o., 1955  Ad[facility] Dx: [facility] [N17.9]    Reason for Admission / Consult: Mildly elevated troponin    Requesting Physician: Bernardino Chiang MD    CHIEF COMPLAINT: Nausea and vomiting    History Obtained From:  patient, electronic medical record    HISTORY OF PRESENT ILLNESS:      The patient is a 76 y.o.male with a history of CAD s/p PCI in 2008, hypertension, smoking, h/o GI bleeding and anemia who presented with the chief complaint of fatigue. Patient also had decreased p.o. intake with nausea, vomiting and also mild cough. He denied any chest pain, shortness of breath, palpitations or lower extremity edema. In the ED, vitals were stable. EKG showed normal sinus rhythm with PVCs. BMP was significant for acute renal failure with acute rising creatinine 13.73. His initial troponin was 46 but that has since down trended to 19, with improvement in his kidney function. Past Medical History:   has a past medical history of Adenomatous colon polyp, Arthritis, CVA (cerebral vascular accident) ([facility] [address] Diverticulosis of colon, History of colon polyps, Hyperlipidemia, Hypertension, Internal carotid artery stenosis, right: 48 to 69%, MI (myocardial infarction) ([facility] [address] Poor historian, and Transient ischemic attack (TIA). Past Surgical History:   has a past surgical history that includes Colonoscopy (07/25/15); Cardiac surgery (2010); Colonoscopy (12/15/15); hemicolectomy (Right, 12/15/15); Colon surgery (Right, 12/15/15); Upper gastrointestinal endoscopy (1/14/16); Upper gastrointestinal endoscopy (11/13/15); Upper gastrointestinal endoscopy (3/8/16); Colonoscopy (03-10-16); Endoscopy, colon, diagnostic (7/24/2015); Endoscopy, colon, diagnostic (01/13/2016);  Upper gastrointestinal endoscopy (12/22/2017); and Upper gastrointestinal endoscopy (N/A, 12/22/2017). Home Medications:    Prior to Admission medications    Medication Sig Start Date End Date Taking?  Authorizing Provider   magnesium oxide (MAG-OX) 400 MG tablet Take 400 mg by mouth daily   Yes Historical Provider, MD MARCELO ASPIRIN ADULT LOW DOSE 81 MG chewable tablet chew and swallow 1 tablet by mouth once daily 7/8/20  Yes Swathi Dent APRN - CNP   memantine [facility]) 5 MG tablet take 1 tablet by mouth once daily 5/4/20  Yes Swathi Dent APRN - CNP   furosemide (LASIX) 20 MG tablet Take 1 tablet by mouth daily 1/28/20  Yes Mag Nunes MD   Acetaminophen (TYLENOL EXTRA STRENGTH PO) Take by mouth   Yes Historical Provider, MD   metoprolol tartrate (LOPRESSOR) 25 MG tablet Take 0.5 tablets by mouth 2 times daily 1/6/20  Yes Swathi Dent APRN - CNP   lisinopril (PRINIVIL;ZESTRIL) 5 MG tablet Take 1 tablet by mouth daily 1/6/20  Yes Swathi Dent APRN - CNP   Cyanocobalamin ER (DAVIAN VITAMIN B-12 TR) 1000 MCG TBCR take 1 tablet by mouth once daily as directed 1/6/20  Yes Swathi Dent APRN - CNP   omeprazole (PRILOSEC) 20 MG delayed release capsule Take 20 mg by mouth daily   Yes Historical Provider, MD   atorvastatin (LIPITOR) 80 MG tablet Take 1 tablet by mouth nightly 1/27/20   Mag Nunes MD        Current Facility-Administered Medications: potassium chloride (KLOR-CON M) extended release tablet 40 mEq, 40 mEq, Oral, PRN **OR** potassium bicarb-citric acid (EFFER-K) effervescent tablet 40 mEq, 40 mEq, Oral, PRN **OR** potassium chloride 10 mEq/100 mL IVPB (Peripheral Line), 10 mEq, Intravenous, PRN  0.45 % sodium chloride infusion, , Intravenous, Continuous  cefTRIAXone (ROCEPHIN) 1 g IVPB in 50 mL D5W minibag, 1 g, Intravenous, Q24H  guaiFENesin (ROBITUSSIN) 100 MG/5ML oral solution 200 mg, 200 mg, Oral, Q4H PRN  fluticasone (FLONASE) 50 MCG/ACT nasal spray 2 spray, 2 spray, Each Nostril, Daily  sodium chloride flush 0.9 % injection 10 mL, 10 mL, Intravenous, 2 times per day  sodium chloride flush 0.9 % injection 10 mL, 10 mL, Intravenous, PRN  acetaminophen (TYLENOL) tablet 650 mg, 650 mg, Oral, Q6H PRN **OR** acetaminophen (TYLENOL) suppository 650 mg, 650 mg, Rectal, Q6H PRN  polyethylene glycol (GLYCOLAX) packet 17 g, 17 g, Oral, Daily PRN  promethazine (PHENERGAN) tablet 12.5 mg, 12.5 mg, Oral, Q6H PRN **OR** ondansetron (ZOFRAN) injection 4 mg, 4 mg, Intravenous, Q6H PRN  heparin (porcine) injection 5,000 Units, 5,000 Units, Subcutaneous, 3 times per day  nicotine (NICODERM CQ) 14 MG/24HR 1 patch, 1 patch, Transdermal, Daily  [Held by provider] atorvastatin (LIPITOR) tablet 80 mg, 80 mg, Oral, Nightly  Facility-Administered Medications Ordered in Other Encounters: acetaminophen (OFIRMEV) infusion 1,000 mg, 1,000 mg, Intravenous, Once    Allergies:  Patient has no known allergies. Social History:   reports that he has been smoking cigarettes. He has a 30.00 pack-year smoking history. He has never used smokeless tobacco. He reports previous alcohol use. He reports that he does not use drugs. Family History: family history is not on file. No h/o sudden cardiac death. No for premature CAD    REVIEW OF SYSTEMS:    · Constitutional: there has been no unanticipated weight loss. There's been No change in energy level, No change in activity level. · Eyes: No visual changes or diplopia. No scleral icterus. · ENT: No Headaches  · Cardiovascular: Remaining as above  · Respiratory: No previous pulmonary problems, No cough  · Gastrointestinal: No abdominal pain. No change in bowel or bladder habits. · Genitourinary: No dysuria, trouble voiding, or hematuria. · Musculoskeletal:  No gait disturbance, No weakness or joint complaints. · Integumentary: No rash or pruritis. · Neurological: No headache, diplopia, change in muscle strength, numbness or tingling.  No change in gait, balance, coordination, mood, affect, memory, mentation, behavior. · Psychiatric: No anxiety, or depression. · Endocrine: No temperature intolerance. No excessive thirst, fluid intake, or urination. No tremor. · Hematologic/Lymphatic: No abnormal bruising or bleeding, blood clots or swollen lymph nodes. · Allergic/Immunologic: No nasal congestion or hives. PHYSICAL EXAM:      BP (!) 122/56   Pulse 74   Temp 98.6 °F (37 °C) (Oral)   Resp 18   Ht 5' 9\" (1.753 m)   Wt 185 lb 12.8 oz (84.3 kg)   SpO2 95%   BMI 27.44 kg/m²      Intake/Output Summary (Last 24 hours) at 7/11/2020 9224  Last data filed at 7/11/2020 0600  Gross per 24 hour   Intake 1764 ml   Output 2650 ml   Net -886 ml         Constitutional and General Appearance: alert, cooperative, no distress and appears stated age  HEENT: PERRL, no cervical lymphadenopathy. No masses palpable. Normal oral mucosa  Respiratory:  · Normal excursion and expansion without use of accessory muscles  · Resp Auscultation: Good respiratory effort. No for increased work of breathing. On auscultation: clear to auscultation bilaterally  Cardiovascular:  · The apical impulse is not displaced  · Heart tones are crisp and normal. regular S1 and S2.  · Jugular venous pulsation Normal  · The carotid upstroke is normal in amplitude and contour without delay or bruit  · Peripheral pulses are symmetrical and full     Abdomen:   · No masses or tenderness  · Bowel sounds present  Extremities:  ·  No Cyanosis or Clubbing  ·  Lower extremity edema: No  ·  Skin: Warm and dry  Neurological:  · Alert and oriented. · Moves all extremities well  · No abnormalities of mood, affect, memory, mentation, or behavior are noted    DATA:    Diagnostics:    EKG:    Normal sinus rhythm, PVCs     eCHO: 1/24/2019      Left ventricle is mildly enlarged. Left ventricular systolic function is mildly reduced. Estimated EF 45%. Inferior wall hypokinesis noted. Inferolateral wall hypokinesis noted.   No significant valvular regurgitation or stenosis seen.             Labs:     CBC:   Recent Labs     07/10/20  0506 07/11/20  0537   WBC 6.5 7.0   HGB 11.4* 11.9*   HCT 34.0* 35.1*    184       BMP:   Recent Labs     07/10/20  2353 07/11/20  0537    147*   K 3.2* 3.4*   CO2 19* 19*   BUN 62* 55*   CREATININE 4.21* 3.17*   LABGLOM 14* 20*   GLUCOSE 112* 132*     Pro-BNP:  No results for input(s): PROBNP in the last 72 hours. BNP: No results for input(s): BNP in the last 72 hours. PT/INR:   Recent Labs     07/09/20  1434   PROTIME 10.6   INR 1.0     APTT:No results for input(s): APTT in the last 72 hours. CARDIAC ENZYMES:  Recent Labs     07/09/20  1503 07/11/20  0537   CKTOTAL 415* 282     Recent Labs     07/09/20  1434 07/09/20  1717 07/10/20  0948   TROPONINT NOT REPORTED NOT REPORTED NOT REPORTED      Recent Labs     07/09/20  1434 07/09/20  1717 07/10/20  0948   TROPHS 46* 36* 19     FASTING LIPID PANEL:  Lab Results   Component Value Date    HDL 30 01/26/2020    TRIG 82 01/26/2020     LIVER PROFILE:  Recent Labs     07/09/20  1434   AST 17   ALT 10   LABALBU 4.0         Patient's Active Problem List  Active Problems:    Hyperlipidemia    GERD (gastroesophageal reflux disease)    LEVI (acute kidney injury) ([facility] [address]    Coronary artery disease involving native coronary artery of native heart without angina pectoris    Benign essential HTN    Acute renal failure (ARF) (HCC)    Metabolic acidosis    Volume depletion  Resolved Problems:    * No resolved hospital problems. *        IMPRESSION:    1. Troponin elevation-secondary to acute renal failure  2. Acute renal failure  3. Hypertension  4. Chronic systolic heart failure with EF 45%- 1/2019  5. Possible UTI  6. Hyperlipidemia  7. Prior TIA/CVA  8. Active smoking    RECOMMENDATIONS:  1. Check Echo  2. Restart home aspirin, metoprolol and statin  3. If Echo stable LVEF ~45% then Can follow-up outpatient    Discussed with patient and Nurse. Laura Hernandez M.D.   Fellow, Cardiovascular Diseases

## 2020-07-11 NOTE — FLOWSHEET NOTE
Patient refusing to wear telemetry. Writer educated patient on reason for telemetry yet still refused. Provider informed.     Elsi Wells RN

## 2020-07-11 NOTE — FLOWSHEET NOTE
Writer asked if patient has urinated in the last 4 hours. Per patient, has been going to restroom instead of using urinal.  Writer educated patient on the importance of accurate output due to current diagnosis of acute kidney injury.   Patient states will use urinal.  Radha Vasquez RN

## 2020-07-12 VITALS
SYSTOLIC BLOOD PRESSURE: 124 MMHG | WEIGHT: 196.6 LBS | DIASTOLIC BLOOD PRESSURE: 52 MMHG | HEART RATE: 61 BPM | HEIGHT: 69 IN | BODY MASS INDEX: 29.12 KG/M2 | RESPIRATION RATE: 16 BRPM | TEMPERATURE: 98.9 F | OXYGEN SATURATION: 98 %

## 2020-07-12 LAB
ABSOLUTE EOS #: 0.11 K/UL (ref 0–0.44)
ABSOLUTE IMMATURE GRANULOCYTE: <0.03 K/UL (ref 0–0.3)
ABSOLUTE LYMPH #: 1.08 K/UL (ref 1.1–3.7)
ABSOLUTE MONO #: 0.78 K/UL (ref 0.1–1.2)
ANION GAP SERPL CALCULATED.3IONS-SCNC: 10 MMOL/L (ref 9–17)
ANION GAP SERPL CALCULATED.3IONS-SCNC: 11 MMOL/L (ref 9–17)
ANION GAP SERPL CALCULATED.3IONS-SCNC: 13 MMOL/L (ref 9–17)
BASOPHILS # BLD: 0 % (ref 0–2)
BASOPHILS ABSOLUTE: <0.03 K/UL (ref 0–0.2)
BUN BLDV-MCNC: 28 MG/DL (ref 8–23)
BUN BLDV-MCNC: 32 MG/DL (ref 8–23)
BUN BLDV-MCNC: 37 MG/DL (ref 8–23)
BUN/CREAT BLD: ABNORMAL (ref 9–20)
CALCIUM SERPL-MCNC: 7.9 MG/DL (ref 8.6–10.4)
CALCIUM SERPL-MCNC: 8.1 MG/DL (ref 8.6–10.4)
CALCIUM SERPL-MCNC: 8.1 MG/DL (ref 8.6–10.4)
CHLORIDE BLD-SCNC: 112 MMOL/L (ref 98–107)
CHLORIDE BLD-SCNC: 113 MMOL/L (ref 98–107)
CHLORIDE BLD-SCNC: 113 MMOL/L (ref 98–107)
CO2: 18 MMOL/L (ref 20–31)
CO2: 19 MMOL/L (ref 20–31)
CO2: 20 MMOL/L (ref 20–31)
CREAT SERPL-MCNC: 1.42 MG/DL (ref 0.7–1.2)
CREAT SERPL-MCNC: 1.51 MG/DL (ref 0.7–1.2)
CREAT SERPL-MCNC: 1.88 MG/DL (ref 0.7–1.2)
DIFFERENTIAL TYPE: ABNORMAL
EOSINOPHILS RELATIVE PERCENT: 2 % (ref 1–4)
GFR AFRICAN AMERICAN: 44 ML/MIN
GFR AFRICAN AMERICAN: 56 ML/MIN
GFR AFRICAN AMERICAN: >60 ML/MIN
GFR NON-AFRICAN AMERICAN: 36 ML/MIN
GFR NON-AFRICAN AMERICAN: 46 ML/MIN
GFR NON-AFRICAN AMERICAN: 50 ML/MIN
GFR SERPL CREATININE-BSD FRML MDRD: ABNORMAL ML/MIN/{1.73_M2}
GLUCOSE BLD-MCNC: 102 MG/DL (ref 70–99)
GLUCOSE BLD-MCNC: 148 MG/DL (ref 70–99)
GLUCOSE BLD-MCNC: 99 MG/DL (ref 70–99)
HCT VFR BLD CALC: 34 % (ref 40.7–50.3)
HEMOGLOBIN: 11.6 G/DL (ref 13–17)
IMMATURE GRANULOCYTES: 0 %
LYMPHOCYTES # BLD: 15 % (ref 24–43)
MAGNESIUM: 1.6 MG/DL (ref 1.6–2.6)
MCH RBC QN AUTO: 31 PG (ref 25.2–33.5)
MCHC RBC AUTO-ENTMCNC: 34.1 G/DL (ref 28.4–34.8)
MCV RBC AUTO: 90.9 FL (ref 82.6–102.9)
MONOCYTES # BLD: 11 % (ref 3–12)
NRBC AUTOMATED: 0 PER 100 WBC
PDW BLD-RTO: 14.9 % (ref 11.8–14.4)
PLATELET # BLD: 166 K/UL (ref 138–453)
PLATELET ESTIMATE: ABNORMAL
PMV BLD AUTO: 9.9 FL (ref 8.1–13.5)
POTASSIUM SERPL-SCNC: 3.5 MMOL/L (ref 3.7–5.3)
POTASSIUM SERPL-SCNC: 3.6 MMOL/L (ref 3.7–5.3)
POTASSIUM SERPL-SCNC: 3.8 MMOL/L (ref 3.7–5.3)
RBC # BLD: 3.74 M/UL (ref 4.21–5.77)
RBC # BLD: ABNORMAL 10*6/UL
SEG NEUTROPHILS: 72 % (ref 36–65)
SEGMENTED NEUTROPHILS ABSOLUTE COUNT: 5.31 K/UL (ref 1.5–8.1)
SODIUM BLD-SCNC: 142 MMOL/L (ref 135–144)
SODIUM BLD-SCNC: 143 MMOL/L (ref 135–144)
SODIUM BLD-SCNC: 144 MMOL/L (ref 135–144)
WBC # BLD: 7.3 K/UL (ref 3.5–11.3)
WBC # BLD: ABNORMAL 10*3/UL

## 2020-07-12 PROCEDURE — 80048 BASIC METABOLIC PNL TOTAL CA: CPT

## 2020-07-12 PROCEDURE — 6370000000 HC RX 637 (ALT 250 FOR IP): Performed by: STUDENT IN AN ORGANIZED HEALTH CARE EDUCATION/TRAINING PROGRAM

## 2020-07-12 PROCEDURE — 2580000003 HC RX 258: Performed by: NURSE PRACTITIONER

## 2020-07-12 PROCEDURE — 6360000002 HC RX W HCPCS: Performed by: INTERNAL MEDICINE

## 2020-07-12 PROCEDURE — 6360000002 HC RX W HCPCS: Performed by: STUDENT IN AN ORGANIZED HEALTH CARE EDUCATION/TRAINING PROGRAM

## 2020-07-12 PROCEDURE — 99232 SBSQ HOSP IP/OBS MODERATE 35: CPT | Performed by: INTERNAL MEDICINE

## 2020-07-12 PROCEDURE — 99239 HOSP IP/OBS DSCHRG MGMT >30: CPT | Performed by: INTERNAL MEDICINE

## 2020-07-12 PROCEDURE — 36415 COLL VENOUS BLD VENIPUNCTURE: CPT

## 2020-07-12 PROCEDURE — 83735 ASSAY OF MAGNESIUM: CPT

## 2020-07-12 PROCEDURE — 85025 COMPLETE CBC W/AUTO DIFF WBC: CPT

## 2020-07-12 RX ORDER — POTASSIUM CHLORIDE 20 MEQ/1
40 TABLET, EXTENDED RELEASE ORAL 2 TIMES DAILY WITH MEALS
Status: DISCONTINUED | OUTPATIENT
Start: 2020-07-12 | End: 2020-07-12 | Stop reason: HOSPADM

## 2020-07-12 RX ORDER — NICOTINE 21 MG/24HR
1 PATCH, TRANSDERMAL 24 HOURS TRANSDERMAL DAILY PRN
Qty: 30 PATCH | Refills: 3 | Status: SHIPPED | OUTPATIENT
Start: 2020-07-12 | End: 2021-06-07

## 2020-07-12 RX ORDER — MAGNESIUM SULFATE 1 G/100ML
1 INJECTION INTRAVENOUS
Status: DISPENSED | OUTPATIENT
Start: 2020-07-12 | End: 2020-07-12

## 2020-07-12 RX ORDER — CALCIUM GLUCONATE 20 MG/ML
1 INJECTION, SOLUTION INTRAVENOUS ONCE
Status: COMPLETED | OUTPATIENT
Start: 2020-07-12 | End: 2020-07-12

## 2020-07-12 RX ORDER — ATORVASTATIN CALCIUM 80 MG/1
40 TABLET, FILM COATED ORAL NIGHTLY
Qty: 30 TABLET | Refills: 3 | Status: SHIPPED | OUTPATIENT
Start: 2020-07-12 | End: 2020-11-05 | Stop reason: ALTCHOICE

## 2020-07-12 RX ORDER — MECLIZINE HCL 12.5 MG/1
12.5 TABLET ORAL 3 TIMES DAILY PRN
Status: DISCONTINUED | OUTPATIENT
Start: 2020-07-12 | End: 2020-07-12

## 2020-07-12 RX ORDER — MAGNESIUM SULFATE 1 G/100ML
1 INJECTION INTRAVENOUS ONCE
Status: COMPLETED | OUTPATIENT
Start: 2020-07-12 | End: 2020-07-12

## 2020-07-12 RX ADMIN — CALCIUM GLUCONATE 1 G: 20 INJECTION, SOLUTION INTRAVENOUS at 11:50

## 2020-07-12 RX ADMIN — CALCIUM GLUCONATE 1 G: 20 INJECTION, SOLUTION INTRAVENOUS at 08:42

## 2020-07-12 RX ADMIN — HEPARIN SODIUM 5000 UNITS: 5000 INJECTION INTRAVENOUS; SUBCUTANEOUS at 13:30

## 2020-07-12 RX ADMIN — SODIUM CHLORIDE: 4.5 INJECTION, SOLUTION INTRAVENOUS at 02:00

## 2020-07-12 RX ADMIN — POTASSIUM CHLORIDE 40 MEQ: 1500 TABLET, EXTENDED RELEASE ORAL at 08:41

## 2020-07-12 RX ADMIN — MAGNESIUM SULFATE HEPTAHYDRATE 1 G: 1 INJECTION, SOLUTION INTRAVENOUS at 14:35

## 2020-07-12 RX ADMIN — MAGNESIUM SULFATE HEPTAHYDRATE 1 G: 1 INJECTION, SOLUTION INTRAVENOUS at 13:30

## 2020-07-12 RX ADMIN — FLUTICASONE PROPIONATE 2 SPRAY: 50 SPRAY, METERED NASAL at 08:42

## 2020-07-12 RX ADMIN — SODIUM CHLORIDE: 4.5 INJECTION, SOLUTION INTRAVENOUS at 13:30

## 2020-07-12 RX ADMIN — HEPARIN SODIUM 5000 UNITS: 5000 INJECTION INTRAVENOUS; SUBCUTANEOUS at 06:37

## 2020-07-12 ASSESSMENT — PAIN SCALES - GENERAL: PAINLEVEL_OUTOF10: 0

## 2020-07-12 NOTE — FLOWSHEET NOTE
Writer paged Dr. Headley Dears regarding patient calcium of 7.9. Awaiting recommendations.   Lisa Greenfield RN

## 2020-07-12 NOTE — PROGRESS NOTES
CLINICAL PHARMACY NOTE: MEDS TO [address] Patient?: No  Total # of Prescriptions Filled: 2   The following medications were delivered to the patient:  · lipitor   · Nicotine patches  Total # of Interventions Completed: 0  Time Spent (min): 0    Additional Documentation: meds not delivered to the pt. The lipitor was too soon to fill and the nicotine patches were not covered under the pt insurance. I tried to call the unit to tell the nurse. It rang back to the unit clerk, she got irritated with me and said hold on. And no one ever answered.  Pt did not go home with [id number]

## 2020-07-12 NOTE — PROGRESS NOTES
Renal Progress Note    Patient :  Hedy Lopez; 76 y.o. MRN# [id number]  Location:    Attending:  Freda Moreno MD  Admit Date:  2020   Hospital Day: 3    Subjective   Patient was seen and examined. No acute events overnight. Vital signs stable   I/O over last 24 hours: 132/= -704 mL. Overall positive 2.1 L since admission. Labs reviewed. Admitted with creatinine of 13.73, yesterday was 8.63, now 1.51. Continues to trend down. Na 142; K 3.5; Cl 112; CO2 20; BUN 32; glucose 99; Ca 7.9; Hgb 11.6. Immunofixation pending.      Objective     VS: /63   Pulse 57   Temp 99 °F (37.2 °C) (Oral)   Resp 18   Ht 5' 9\" (1.753 m)   Wt 196 lb 9.6 oz (89.2 kg)   SpO2 100%   BMI 29.03 kg/m²   MAXIMUM TEMPERATURE OVER 24HRS:  Temp (24hrs), Av.6 °F (37 °C), Min:98.2 °F (36.8 °C), Max:99.1 °F (37.3 °C)    24HR BLOOD PRESSURE RANGE:  Systolic (99DNR), HHQ:336 , Min:122 , SAZ:047   ; Diastolic (99PFZ), JXP:99, Min:61, Max:78    24HR INTAKE/OUTPUT:      Intake/Output Summary (Last 24 hours) at 2020 0846  Last data filed at 2020 1613  Gross per 24 hour   Intake 1320.98 ml   Output 2025 ml   Net -704.02 ml     WEIGHT :  Patient Vitals for the past 96 hrs (Last 3 readings):   Weight   20 0508 196 lb 9.6 oz (89.2 kg)   20 0441 185 lb 12.8 oz (84.3 kg)   20 2251 190 lb 14.7 oz (86.6 kg)     Current Medications:     Scheduled Meds:    calcium gluconate  1 g Intravenous Once    potassium chloride  40 mEq Oral BID WC    magnesium sulfate  1 g Intravenous Q1H    cefTRIAXone (ROCEPHIN) IV  1 g Intravenous Q24H    fluticasone  2 spray Each Nostril Daily    sodium chloride flush  10 mL Intravenous 2 times per day    heparin (porcine)  5,000 Units Subcutaneous 3 times per day    nicotine  1 patch Transdermal Daily    [Held by provider] atorvastatin  80 mg Oral Nightly     Continuous Infusions:    sodium chloride 75 mL/hr at 20 0200       Physical Examination: General:  AAO x 3, speaking in full sentences, no accessory muscle use. Chest:   Bilateral vesicular breath sounds, no rales or wheezes. Cardiac:  S1 S2 RR, no murmurs, gallops or rubs, JVP not raised. Abdomen: Soft, non-tender, non distended, BS audible. SKIN:  No rashes, good skin turgor. Extremities:  No edema, no clubbing, No cyanosis  Neuro:  AAO x 3, No FND. Labs:       Recent Labs     07/10/20  0506 07/11/20  0537 07/12/20  0651   WBC 6.5 7.0 7.3   RBC 3.71* 3.86* 3.74*   HGB 11.4* 11.9* 11.6*   HCT 34.0* 35.1* 34.0*   MCV 91.6 90.9 90.9   MCH 30.7 30.8 31.0   MCHC 33.5 33.9 34.1   RDW 14.9* 14.6* 14.9*    184 166   MPV 9.7 9.6 9.9      BMP:   Recent Labs     07/11/20  1753 07/12/20  0027 07/12/20  0651    144 142   K 3.5* 3.6* 3.5*   * 113* 112*   CO2 20 18* 20   BUN 42* 37* 32*   CREATININE 2.06* 1.88* 1.51*   GLUCOSE 115* 102* 99   CALCIUM 8.4* 8.1* 7.9*      Phosphorus:     Recent Labs     07/11/20  0537   PHOS 2.2*     Magnesium:    Recent Labs     07/11/20  0537 07/11/20  1753 07/12/20  0651   MG 1.9 1.8 1.6     Albumin:    Recent Labs     07/09/20  1434   LABALBU 4.0     BNP:      Lab Results   Component Value Date    BNP 25 09/20/2011     Urinalysis/Chemistries:      Lab Results   Component Value Date    NITRU NEGATIVE 07/09/2020    COLORU DARK YELLOW 07/09/2020    PHUR 5.0 07/09/2020    WBCUA 10 TO 20 07/09/2020    RBCUA 5 TO 10 07/09/2020    MUCUS 1+ 07/09/2020    TRICHOMONAS NOT REPORTED 07/09/2020    YEAST NOT REPORTED 07/09/2020    BACTERIA FEW 07/09/2020    SPECGRAV 1.027 07/09/2020    LEUKOCYTESUR TRACE 07/09/2020    UROBILINOGEN Normal 07/09/2020    BILIRUBINUR NEGATIVE  Verified by ictotest. 07/09/2020    GLUCOSEU NEGATIVE 07/09/2020    [address] TRACE 07/09/2020    AMORPHOUS NOT REPORTED 07/09/2020       Radiology:     CXR: Reviewed as available. Assessment:     1.  LEVI secondary to ischemic ATN stemming from volume depletion, decreased oral intake while he was still taking diuretics and lisinopril at home. Reactant was 13 on admission, down to 8.6 today. Creatinine is 1.4 and still improving. 2.  Possible urinary tract infection - continues on Rocephin. 3. HYPERTENSION. Patient was quite hypotensive secondary to hypovolemia, now with good blood pressures  4. Patient has history of coronary artery disease. Last echo showed EF of around 45%. Plan:   1. Stop 0.45% NS IVF   2. Replace electrolytes per protocol. 3. Stable for discharge today   4. Avoid nephrotoxic agents and IV contrast if at all possible. 5. Immunofixation pending, but elevated free light chain ratio        Nutrition   Renal Diet/TF      Electronically signed by Estefany Reyna CNP, APRN - CNP on 7/12/2020 at 8:46 AM   [facility]    Attending Physician Statement  I have discussed the care of Tyshawn Martin, including pertinent history and exam findings with the CNP. I have reviewed the key elements of all parts of the encounter with the CNP. I have seen and examined the patient. I agree with the assessment and plan and status of the problem list as documented. Addiitionally I recommend discontinue the IV fluids. Patient is stable today for discharge from Nephrology stand point. He should follow up with my partner, Dr. Ermelinda Sanchez, after discharge in 3-4 weeks, at [facility], [phone].     Radha Armando MD  Nephrology Attending Physician  [facility]

## 2020-07-12 NOTE — FLOWSHEET NOTE
Writer called in [address] 40 mg to [facility] on [address] due to meds to beds being unable to fill.   Evelyn Campbell RN

## 2020-07-12 NOTE — FLOWSHEET NOTE
Order for discharge received and meds to beds delievered. IV removed with no complications. Writer reviewed discharge instructions with patient and questions answered. Piotr contacted. Patient left with documented items.

## 2020-07-12 NOTE — PROGRESS NOTES
°C) (Oral)   Resp 16   Ht 5' 9\" (1.753 m)   Wt 196 lb 9.6 oz (89.2 kg)   SpO2 98%   BMI 29.03 kg/m²     Temp (24hrs), Av.7 °F (37.1 °C), Min:98.1 °F (36.7 °C), Max:99.1 °F (37.3 °C)    In: 870   Out: 850 [Urine:850]    Physical Exam:  Constitutional: This is a well developed, well nourished, 25-29.9 - Overweight 76y.o. year old male who is alert, oriented, cooperative and in no apparent distress.   HEENT - Moist mucus membranes   Respiratory: Breath sounds bilaterally were clear to auscultation  Cardiovascular: Regular without murmur  Abdomen: Soft and nontender  Extremities:  No lower extremity edema  Neurological/Psychiatric: The patient's general behavior, level of consciousness, thought content and emotional status is normal.    Medications:  Scheduled Medications:    [COMPLETED] calcium gluconate  1 g Intravenous Once    potassium chloride  40 mEq Oral BID WC    cefTRIAXone (ROCEPHIN) IV  1 g Intravenous Q24H    fluticasone  2 spray Each Nostril Daily    sodium chloride flush  10 mL Intravenous 2 times per day    heparin (porcine)  5,000 Units Subcutaneous 3 times per day    nicotine  1 patch Transdermal Daily    [Held by provider] atorvastatin  80 mg Oral Nightly     Continuous Infusions:    sodium chloride 75 mL/hr at 20 0200     PRN Medicationspotassium chloride, 40 mEq, PRN    Or  potassium alternative oral replacement, 40 mEq, PRN    Or  potassium chloride, 10 mEq, PRN  guaiFENesin, 200 mg, Q4H PRN  sodium chloride flush, 10 mL, PRN  acetaminophen, 650 mg, Q6H PRN    Or  acetaminophen, 650 mg, Q6H PRN  polyethylene glycol, 17 g, Daily PRN  promethazine, 12.5 mg, Q6H PRN    Or  ondansetron, 4 mg, Q6H PRN        Diagnostic Labs:  CBC:   Recent Labs     07/10/20  0506 20  0537 20  0651   WBC 6.5 7.0 7.3   RBC 3.71* 3.86* 3.74*   HGB 11.4* 11.9* 11.6*   HCT 34.0* 35.1* 34.0*   MCV 91.6 90.9 90.9   RDW 14.9* 14.6* 14.9*    184 166     BMP:   Recent Labs 07/11/20  0537  07/12/20  0027 07/12/20  0651 07/12/20  1145   *   < > 144 142 143   K 3.4*   < > 3.6* 3.5* 3.8   *   < > 113* 112* 113*   CO2 19*   < > 18* 20 19*   PHOS 2.2*  --   --   --   --    BUN 55*   < > 37* 32* 28*   CREATININE 3.17*   < > 1.88* 1.51* 1.42*    < > = values in this interval not displayed. BNP: No results for input(s): BNP in the last 72 hours. PT/INR:   Recent Labs     07/09/20  1434   PROTIME 10.6   INR 1.0     APTT: No results for input(s): APTT in the last 72 hours. CARDIAC ENZYMES: No results for input(s): CKMB, CKMBINDEX, TROPONINI in the last 72 hours. Invalid input(s): CKTOTAL;3  FASTING LIPID PANEL:  Lab Results   Component Value Date    CHOL 178 01/26/2020    HDL 30 (L) 01/26/2020    TRIG 82 01/26/2020     LIVER PROFILE:   Recent Labs     07/09/20  1434   AST 17   ALT 10   BILITOT 0.68   ALKPHOS 95      MICROBIOLOGY:   Lab Results   Component Value Date/Time    CULTURE NO GROWTH 3 DAYS 07/09/2020 06:46 PM       Imaging:    Us Renal Complete    Result Date: 7/10/2020  Limited, unremarkable ultrasound of the kidneys and urinary bladder. No hydronephrosis. Xr Chest Portable    Result Date: 7/9/2020  No evidence of acute cardiopulmonary process. ASSESSMENT & PLAN     ASSESSMENT / PLAN:     Dehydration - I/O's -704 favoring output. BMP within normal limits.       LEVI - Cr 1.51. this am and 1.42 around noon. Trending down towards normal.  Will follow up with nephrology outpatient for immunofixation study results.       Rhabdomyolysis - K+ 3.5. Resolved.       Possible UTI - WBC 7.3. Patient on Rocephin 1 g.     Benign essential HTN - /52. Antihypertensives held due to volume depletion. EF 45% on 1/23/2019. Will have outpatient Echo done per cardiology recommendations.         DVT ppx : Heparin    PT/OT/SW : On Board   Discharge Planning:  Home today     Mesfin Brooks MD  Internal Medicine Resident, PGY-1  [facility]; [place], [place]  7/12/2020, 1:02 PM      Attending Physician Statement  I have discussed the case, including pertinent history and exam findings with the resident and the team.  I have seen and examined the patient and the key elements of the encounter have been performed by me. I agree with the assessment, plan and orders as documented by the resident. Patient is very eager about going home. Clinically he looks much better. His renal function has improved. Replace potassium  Cardiology and nephrology recommended discharge home with outpatient follow-up.   Cardiology said they will obtain echo as an outpatient  Patient will be discharged home with instructions to follow-up as an outpatient  Discharge time: 35 minutes  Freda Moreno MD  Attending Physician, Internal Medicine Service    Internal Medicine Residency Program  7/12/2020, 2:22 PM

## 2020-07-13 ENCOUNTER — TELEPHONE (OUTPATIENT)
Dept: PRIMARY CARE CLINIC | Age: 65
End: 2020-07-13

## 2020-07-13 ENCOUNTER — CARE COORDINATION (OUTPATIENT)
Dept: CASE MANAGEMENT | Age: 65
End: 2020-07-13

## 2020-07-13 LAB — ANTI-NUCLEAR ANTIBODY (ANA): ABNORMAL

## 2020-07-13 NOTE — DISCHARGE SUMMARY
Chief complaint:   Chief Complaint   Patient presents with   • Ear Pain     rm 1 - right ear pain. Pt states that he completed \"zpak\" yesterday and states that he is not feeling better   • Sinus Problem     facial pain.        Vitals:  Visit Vitals   • /88   • Pulse 88   • Temp 97.9 °F (36.6 °C) (Oral)   • Resp 20   • Ht 5' 11\" (1.803 m)   • Wt 117.9 kg   • BMI 36.26 kg/m2       HISTORY OF PRESENT ILLNESS     HPI  This is a 42-year-old male presents to clinic today with right ear congestion. He states that he was seen 5 days ago with similar complaints was given a azithromycin with no improvement of his symptoms. He states that he has a lot of pressure in his right ear which is traveling into his face. He states that he just does not feel well and has tried multiple over-the-counter medications with little to no relief of his symptoms.    Other significant problems:  There are no active problems to display for this patient.      PAST MEDICAL, FAMILY AND SOCIAL HISTORY     Medications:  Current Outpatient Prescriptions   Medication   • methylPREDNISolone (MEDROL DOSEPAK) 4 MG tablet     No current facility-administered medications for this visit.        Allergies:  ALLERGIES:   Allergen Reactions   • Penicillins HIVES       Past Medical  History/Surgeries:  No past medical history on file.    No past surgical history on file.    Family History:  Family History   Problem Relation Age of Onset   • COPD Father        Social History:  Social History   Substance Use Topics   • Smoking status: Current Every Day Smoker   • Smokeless tobacco: Not on file   • Alcohol use Not on file       REVIEW OF SYSTEMS     Review of Systems   Constitutional: Negative.    HENT: Positive for congestion.    Eyes: Positive for pain.   Respiratory: Negative.        PHYSICAL EXAM     Visit Vitals   • /88   • Pulse 88   • Temp 97.9 °F (36.6 °C) (Oral)   • Resp 20   • Ht 5' 11\" (1.803 m)   • Wt 117.9 kg   • BMI 36.26 kg/m2        Physical Exam   Constitutional: He appears well-developed and well-nourished.   HENT:   Head: Normocephalic and atraumatic.   Right Ear: External ear normal.   Left Ear: External ear normal.   Eyes: Conjunctivae and EOM are normal. Pupils are equal, round, and reactive to light.   Neck: Normal range of motion. Neck supple.   Pulmonary/Chest: Effort normal and breath sounds normal. No respiratory distress. He has no wheezes. He has no rales. He exhibits no tenderness.   Nursing note and vitals reviewed.      ASSESSMENT/PLAN     (J98.8) Congestion of upper airway  (primary encounter diagnosis)  Plan: methylPREDNISolone (MEDROL DOSEPAK) 4 MG tablet          I explained to him that his symptoms are benign and there were no signs of infection. He will give be given a five-day course of methylprednisolone as a last ditch effort. Patient will continue using over-the-counter medications. He'll follow-up with his primary care physician if symptoms fail to improve. He was agreeable to this plan was discharge in stable condition.       none    Discharge Functional Status:  stable    DISCHARGE PLAN     Disposition: home    Patient Instructions:   Discharge Medication List as of 7/12/2020  3:54 PM      CONTINUE these medications which have CHANGED    Details   atorvastatin (LIPITOR) 80 MG tablet Take 0.5 tablets by mouth nightly, Disp-30 tablet,R-3Normal      nicotine (NICODERM CQ) 21 MG/24HR Place 1 patch onto the skin daily as needed (if patient smokes), Disp-30 patch,R-3Normal         CONTINUE these medications which have NOT CHANGED    Details   magnesium oxide (MAG-OX) 400 MG tablet Take 400 mg by mouth dailyHistorical Med      RA ASPIRIN ADULT LOW DOSE 81 MG chewable tablet chew and swallow 1 tablet by mouth once daily, Disp-30 tablet, R-3Normal      memantine (NAMENDA) 5 MG tablet take 1 tablet by mouth once daily, Disp-30 tablet, R-1Normal      Acetaminophen (TYLENOL EXTRA STRENGTH PO) Take by mouthHistorical Med      Cyanocobalamin ER (RA VITAMIN B-12 TR) 1000 MCG TBCR take 1 tablet by mouth once daily as directed, Disp-30 tablet, R-3Normal         STOP taking these medications       furosemide (LASIX) 20 MG tablet Comments:   Reason for Stopping:         metoprolol tartrate (LOPRESSOR) 25 MG tablet Comments:   Reason for Stopping:         lisinopril (PRINIVIL;ZESTRIL) 5 MG tablet Comments:   Reason for Stopping:         omeprazole (PRILOSEC) 20 MG delayed release capsule Comments:   Reason for Stopping:               Activity: activity as tolerated    Diet: cardiac diet and renal diet    Follow-up:    Huan Blount, APRN - CNP  [address] [facility]    In 1 week      [facility], [address] [facility]  [address] [facility] [address]    In 3 weeks      [facility] [place], [place]. [address]  [phone]    In 2 weeks  Lopressor and Lisinopril were held due to Severe hypotension lead to LEVI, need medical optimization       Patient Instructions: Please take all medications as advised. Please follow up with Dr. Gloria Flores in 3 week for results of immunofixation studies. Please follow up with Dr. Osvaldo Ba in 2 weeks for an Outpatient Echocardiogram.  Please follow up with you PCP in a week for Basic Metabolic Panel Testing. Follow up labs: BMP in 1 week  Follow up imaging: Echocardiogram in 2 weeks    Note that over 30 minutes was spent in preparing discharge papers, discussing discharge with patient, medication review, etc.      Paul Benitez MD, MD  Internal Medicine Resident, PGY-1  [facility];  [place], [place]  7/13/2020, 2:25 PM

## 2020-07-13 NOTE — CARE COORDINATION
[facility] Transitions Initial Follow Up Call    Call within 2 business days of discharge: Yes    Patient: Shannan Macdonald Patient : 1955   MRN: [id number]  Reason for Admission: Acute renal failure  Discharge Date: 20 RARS: Readmission Risk Score: 22      Last Discharge [facility]       Complaint Diagnosis Description Type Department Provider    20 Fatigue Acute renal failure, unspecified acute renal failure type ([facility] [address] . .. [facility] Crouch MD; Disha Willams. .. Spoke with: Alden, patient's S.O. Facility: [facility]    Non-face-to-face services provided:  Obtained and reviewed discharge summary and/or continuity of care documents     Spoke with patient's S.O, Ty who said that patient was doing well today and having no further n/v. She states he feels good and has no problems eating or drinking. Discharge instructions reviewed, 1111F  done. Girlfriend assists with medication management and has list in her phone of his meds. She was made aware of medications to be stopped. He is to have blood work done and have 2D echo which was scheduled for this Wednesday at [facility]. She states she tried to get his follow up scheduled and was on hold for over 1/2 hour. I offered to schedule however I too was on hold for quite some time and had to defer to the next day to schedule. They deny any needs or concerns at this time. I did finally get in touch with main scheduling line and they will call patient tomorrow to get scheduled as the office is currently closed.      Care Transitions 24 Hour Call    Do you have any ongoing symptoms?:  No  Do you have a copy of your discharge instructions?:  Yes  Do you have all of your prescriptions and are they filled?:  Yes  Have you been contacted by a [facility] Pharmacist?:  No  Have you scheduled your follow up appointment?:  No (Comment: was on hold for over 1/2 hour with no answer)  Were you discharged with any Home Care or Post Acute Services:  No  Do you feel like you have everything you need to keep you well at home?:  Yes  Care Transitions Interventions         Follow Up  Future Appointments   Date Time Provider Indiana Barber   7/15/2020 11:00 AM [facility] ECHO RM [address] Wilfredo Conception, [address] [facility]

## 2020-07-13 NOTE — CARE COORDINATION
[facility] Transitions Initial Follow Up Call    Call within 2 business days of discharge: Yes    Patient: Tania Camejo Patient : 1955   MRN: [id number]  Reason for Admission: ARF  Discharge Date: 20 RARS: Readmission Risk Score: 22      Last Discharge [facility]       Complaint Diagnosis Description Type Department Provider    20 Fatigue Acute renal failure, unspecified acute renal failure type ([facility] [address] . .. ED to Hosp-Admission (Discharged) (ADMITTED) [facility] CAR 2 Brittnee Schumacher MD; Federica Merida. .. Attempted initial 24 hour transitional call to patient. Left VM to return call directly to [phone]. 1st attempt.      Facility: Ofe Luong RN

## 2020-07-14 LAB
ALBUMIN (CALCULATED): 3.2 G/DL (ref 3.2–5.2)
ALBUMIN PERCENT: 55 % (ref 45–65)
ALPHA 1 PERCENT: 3 % (ref 3–6)
ALPHA 2 PERCENT: 9 % (ref 6–13)
ALPHA-1-GLOBULIN: 0.2 G/DL (ref 0.1–0.4)
ALPHA-2-GLOBULIN: 0.5 G/DL (ref 0.5–0.9)
ANCA MYELOPEROXIDASE: 11 AU/ML
ANCA PROTEINASE 3: 3 AU/ML
BETA GLOBULIN: 0.7 G/DL (ref 0.5–1.1)
BETA PERCENT: 12 % (ref 11–19)
GAMMA GLOBULIN %: 21 % (ref 9–20)
GAMMA GLOBULIN: 1.2 G/DL (ref 0.5–1.5)
PATHOLOGIST: ABNORMAL
PROTEIN ELECTROPHORESIS, SERUM: ABNORMAL
TOTAL PROT. SUM,%: 100 % (ref 98–102)
TOTAL PROT. SUM: 5.8 G/DL (ref 6.3–8.2)
TOTAL PROTEIN: 5.8 G/DL (ref 6.4–8.3)

## 2020-07-15 ENCOUNTER — HOSPITAL ENCOUNTER (OUTPATIENT)
Dept: NON INVASIVE DIAGNOSTICS | Age: 65
Discharge: HOME | End: 2020-07-15
Payer: MEDICARE

## 2020-07-15 LAB
CULTURE: NORMAL
CULTURE: NORMAL
LV EF: 41 %
LVEF MODALITY: NORMAL
Lab: NORMAL
Lab: NORMAL
SPECIMEN DESCRIPTION: NORMAL
SPECIMEN DESCRIPTION: NORMAL

## 2020-07-15 PROCEDURE — 93306 TTE W/DOPPLER COMPLETE: CPT

## 2020-07-16 ENCOUNTER — CARE COORDINATION (OUTPATIENT)
Dept: CASE MANAGEMENT | Age: 65
End: 2020-07-16

## 2020-07-16 NOTE — CARE COORDINATION
[facility] Transitions Follow Up Call    2020    Patient: Emily Miles  Patient : 1955   MRN: [id number]  Reason for Admission: Acute Renal Failure  Discharge Date: 20 RARS: Readmission Risk Score: 22         Attempted to reach patient for subsequent transitional call. VM left to return call to [phone]. 1st attempt.      Elliott Thomas RN

## 2020-07-17 ENCOUNTER — CARE COORDINATION (OUTPATIENT)
Dept: CASE MANAGEMENT | Age: 65
End: 2020-07-17

## 2020-07-17 NOTE — CARE COORDINATION
[facility] Transitions Follow Up Call    2020    Patient: Flaquito Squires  Patient : 1955   MRN: [id number]  Reason for Admission: ARF  Discharge Date: 20 RARS: Readmission Risk Score: 22         Attempted to reach patient for subsequent transitional call. VM left to return call to [phone]. 2nd attempt. Noted that PCP new patient appointment was scheduled for . Care Transitions Subsequent and Final Call    Subsequent and Final Calls  Care Transitions Interventions  Other Interventions:             Follow Up  Future Appointments   Date Time Provider Indiana Barber   2020  1:00 PM CESAR Reis - CNP [facility] Bill Kowalski RN

## 2020-07-21 ENCOUNTER — CARE COORDINATION (OUTPATIENT)
Dept: CASE MANAGEMENT | Age: 65
End: 2020-07-21

## 2020-07-21 NOTE — CARE COORDINATION
[facility] Transitions Follow Up Call    2020    Patient: Vasiliy Edwards  Patient : 1955   MRN: [id number]  Reason for Admission: ARF  Discharge Date: 20 RARS: Readmission Risk Score: 22         Spoke with: Ty, patient's S.O. Spoke with patients girlfriend who states that patient is doing good this week. He has no weakness, shortness of breath, pain or other issues. He has a new patient appointment and Ty was reminded of this. She states she did not know about the appointment but will try to get him to go tomorrow. She was given the date and time. They deny any needs or concerns at this time. Care Transitions Subsequent and Final Call    Schedule Follow Up Appointment with PCP:  Completed  Subsequent and Final Calls  Do you have any ongoing symptoms?:  No  Have your medications changed?:  No  Do you have any questions related to your medications?:  No  Do you currently have any active services?:  No  Do you have any needs or concerns that I can assist you with?:  No  Identified Barriers:  Lack of Education  Care Transitions Interventions  Other Interventions:             Follow Up  Future Appointments   Date Time Provider Indiana Barber   2020  1:00 PM CESAR Portillo - CNP [facility] Jodie Aschoff, RN

## 2020-07-22 ENCOUNTER — OFFICE VISIT (OUTPATIENT)
Dept: PRIMARY CARE CLINIC | Age: 65
End: 2020-07-22
Payer: MEDICARE

## 2020-07-22 ENCOUNTER — HOSPITAL ENCOUNTER (OUTPATIENT)
Age: 65
Discharge: HOME | End: 2020-07-22
Payer: MEDICARE

## 2020-07-22 VITALS
SYSTOLIC BLOOD PRESSURE: 130 MMHG | WEIGHT: 185 LBS | HEART RATE: 81 BPM | DIASTOLIC BLOOD PRESSURE: 79 MMHG | BODY MASS INDEX: 27.32 KG/M2 | OXYGEN SATURATION: 98 % | TEMPERATURE: 99.1 F

## 2020-07-22 LAB
ANION GAP SERPL CALCULATED.3IONS-SCNC: 8 MMOL/L (ref 9–17)
BUN BLDV-MCNC: 10 MG/DL (ref 8–23)
BUN/CREAT BLD: ABNORMAL (ref 9–20)
CALCIUM SERPL-MCNC: 9 MG/DL (ref 8.6–10.4)
CHLORIDE BLD-SCNC: 105 MMOL/L (ref 98–107)
CO2: 27 MMOL/L (ref 20–31)
CREAT SERPL-MCNC: 1.21 MG/DL (ref 0.7–1.2)
GFR AFRICAN AMERICAN: >60 ML/MIN
GFR NON-AFRICAN AMERICAN: >60 ML/MIN
GFR SERPL CREATININE-BSD FRML MDRD: ABNORMAL ML/MIN/{1.73_M2}
GFR SERPL CREATININE-BSD FRML MDRD: ABNORMAL ML/MIN/{1.73_M2}
GLUCOSE BLD-MCNC: 91 MG/DL (ref 70–99)
POTASSIUM SERPL-SCNC: 3.5 MMOL/L (ref 3.7–5.3)
SODIUM BLD-SCNC: 140 MMOL/L (ref 135–144)

## 2020-07-22 PROCEDURE — 36415 COLL VENOUS BLD VENIPUNCTURE: CPT

## 2020-07-22 PROCEDURE — 99495 TRANSJ CARE MGMT MOD F2F 14D: CPT | Performed by: NURSE PRACTITIONER

## 2020-07-22 PROCEDURE — 1111F DSCHRG MED/CURRENT MED MERGE: CPT | Performed by: NURSE PRACTITIONER

## 2020-07-22 PROCEDURE — 80048 BASIC METABOLIC PNL TOTAL CA: CPT

## 2020-07-22 RX ORDER — MEMANTINE HYDROCHLORIDE 5 MG/1
5 TABLET ORAL 2 TIMES DAILY
Qty: 60 TABLET | Refills: 1 | Status: SHIPPED | OUTPATIENT
Start: 2020-07-22 | End: 2021-02-15

## 2020-07-22 ASSESSMENT — ENCOUNTER SYMPTOMS
BLOOD IN STOOL: 0
CHEST TIGHTNESS: 0
PHOTOPHOBIA: 0
EYE REDNESS: 0
VOMITING: 0
EYE PAIN: 0
SHORTNESS OF BREATH: 0
NAUSEA: 0
TROUBLE SWALLOWING: 0
DIARRHEA: 0
WHEEZING: 0
APNEA: 0
ABDOMINAL PAIN: 0

## 2020-07-22 NOTE — PROGRESS NOTES
No follow-ups on file. An electronic signature was used to authenticate this note. --Dominic Segal MA on 7/22/2020 at 12:51 PM  Visit Information    Have you changed or started any medications since your last visit including any over-the-counter medicines, vitamins, or herbal medicines? no   Are you having any side effects from any of your medications? -  no  Have you stopped taking any of your medications? Is so, why? -  no    Have you seen any other physician or provider since your last visit? No  Have you had any other diagnostic tests since your last visit? Yes - Records Requested  Have you been seen in the emergency room and/or had an admission to a hospital since we last saw you? Yes - Records Requested  Have you had your routine dental cleaning in the past 6 months? no    Have you activated your [employer] account? If not, what are your barriers?  Yes     Patient Care Team:  CESAR Fragoso CNP as PCP - General (Family Medicine)  CESAR Fragoso CNP as PCP - [address] [place], [place] [address]  Margoth Haney MD as Consulting Physician (Gastroenterology)  Sidra Finley MD as Consulting Physician (Hematology and Oncology)  Lorenzo Mack, SKY as Care Transitions Nurse  Latanya Blackburn, RN as Care Transitions Nurse  Racquel Mejía, RN as Care Transitions Nurse    Medical History Review  Past Medical, Family, and Social History reviewed and does not contribute to the patient presenting condition    Health Maintenance   Topic Date Due    AAA screen  1955    Hepatitis C screen  1955    Low dose CT lung screening  04/04/2009    Colon cancer screen colonoscopy  03/10/2019    Pneumococcal 65+ years Vaccine (1 of 1 - PPSV23) 04/04/2019    Annual Wellness Visit (AWV)  06/23/2019    Shingles Vaccine (1 of 2) 08/30/2020 (Originally 4/4/2004)    Flu vaccine (1) 09/01/2020    Lipid screen  01/26/2021    A1C test (Diabetic or Prediabetic)  07/10/2021    Potassium monitoring 07/12/2021    Creatinine monitoring  07/12/2021    DTaP/Tdap/Td vaccine (2 - Td) 12/31/2028    Hepatitis A vaccine  Aged Out    Hepatitis B vaccine  Aged Out    Hib vaccine  Aged Out    Meningococcal (ACWY) vaccine  Aged Out

## 2020-07-22 NOTE — PROGRESS NOTES
Post-Discharge Transitional Care Management Services or Hospital Follow Up      Cristina Pandya YOB: 1955    Date of Office Visit:  7/22/2020  Date of Hospital Admission: 7/9/20  Date of Hospital Discharge: 7/12/20  Readmission Risk Score(high >=14%.  Medium >=10%):Readmission Risk Score: 22      Care management risk score Rising risk (score 2-5) and Complex Care (Scores >=6): 6     Non face to face  following discharge, date last encounter closed (first attempt may have been earlier): 7/13/2020  4:31 PM 7/13/2020  4:31 PM    Call initiated 2 business days of discharge: Yes     Patient Active Problem List   Diagnosis    Hyperlipidemia    GERD (gastroesophageal reflux disease)    Sigmoid polyp    Gastric ulcer    LEVI (acute kidney injury) ([address]    Mild malnutrition ([address]    Tobacco use    Colon polyp    Essential hypertension    Coronary artery disease involving native coronary artery of native heart without angina pectoris    Benign essential HTN    AVM (arteriovenous malformation) of duodenum, acquired    Adenomatous colon polyp    PUD (peptic ulcer disease)    Diverticulosis of large intestine without hemorrhage    Arthritis of knee    Multi-infarct state    Left homonymous hemianopsia    Occipital infarction ([address]    Prolonged Q-T interval on ECG    Memory loss of unknown cause    Visual disturbance    H/O: CVA (cerebrovascular accident)    Cerebellar infarct ([address]    Left temporal lobe infarction ([address]    Cataracts, bilateral    Internal carotid artery stenosis, right: 50 to 69%    Hypertensive encephalopathy    Chronic systolic CHF (congestive heart failure) (HCC)    Homonymous hemianopsia following cerebrovascular accident (CVA)    Encephalopathy acute    Acute renal failure (ARF) (HCC)    Metabolic acidosis    Volume depletion       No Known Allergies    Medications listed as ordered at the time of discharge from [facility]. Home Medication Instructions EVAN:    Printed on:07/22/20 8680   Medication Information                      Acetaminophen (TYLENOL EXTRA STRENGTH PO)  Take by mouth             atorvastatin (LIPITOR) 80 MG tablet  Take 0.5 tablets by mouth nightly             Cyanocobalamin ER (RA VITAMIN B-12 TR) 1000 MCG TBCR  take 1 tablet by mouth once daily as directed             magnesium oxide (MAG-OX) 400 MG tablet  Take 400 mg by mouth daily             memantine (NAMENDA) 5 MG tablet  Take 1 tablet by mouth 2 times daily             metoprolol tartrate (LOPRESSOR) 25 MG tablet  Take 0.5 tablets by mouth 2 times daily             nicotine (NICODERM CQ) 21 MG/24HR  Place 1 patch onto the skin daily as needed (if patient smokes)             RA ASPIRIN ADULT LOW DOSE 81 MG chewable tablet  chew and swallow 1 tablet by mouth once daily                   Medications marked \"taking\" at this time  Outpatient Medications Marked as Taking for the 7/22/20 encounter (Office Visit) with Deepak Sylvester, CESAR - CNP   Medication Sig Dispense Refill    memantine (NAMENDA) 5 MG tablet Take 1 tablet by mouth 2 times daily 60 tablet 1    metoprolol tartrate (LOPRESSOR) 25 MG tablet Take 0.5 tablets by mouth 2 times daily 60 tablet 5    atorvastatin (LIPITOR) 80 MG tablet Take 0.5 tablets by mouth nightly 30 tablet 3    magnesium oxide (MAG-OX) 400 MG tablet Take 400 mg by mouth daily      Acetaminophen (TYLENOL EXTRA STRENGTH PO) Take by mouth      Cyanocobalamin ER (RA VITAMIN B-12 TR) 1000 MCG TBCR take 1 tablet by mouth once daily as directed 30 tablet 3        Medications patient taking as of now reconciled against medications ordered at time of hospital discharge: Yes    Chief Complaint   Patient presents with    Follow-Up from [address] [place] [address],Floors 3,4, & 5 course:   Teri Valencia is a 76 y.o. male who was admitted for the management of Acute Kidney Injury, presented to the emergency department with Negative for dizziness, seizures, syncope, numbness and headaches. Psychiatric/Behavioral: Positive for confusion. Negative for suicidal ideas. The patient is not nervous/anxious. Vitals:    07/22/20 1249   BP: 130/79   Site: Right Upper Arm   Position: Sitting   Cuff Size: Medium Adult   Pulse: 81   Temp: 99.1 °F (37.3 °C)   SpO2: 98%   Weight: 185 lb (83.9 kg)     Body mass index is 27.32 kg/m². Wt Readings from Last 3 Encounters:   07/22/20 185 lb (83.9 kg)   07/12/20 196 lb 9.6 oz (89.2 kg)   01/31/20 192 lb 6.4 oz (87.3 kg)     BP Readings from Last 3 Encounters:   07/22/20 130/79   07/12/20 (!) 124/52   01/31/20 106/67     Lab Results   Component Value Date     07/12/2020    K 3.8 07/12/2020     (H) 07/12/2020    CO2 19 (L) 07/12/2020    BUN 28 (H) 07/12/2020    CREATININE 1.42 (H) 07/12/2020    GLUCOSE 148 (H) 07/12/2020    CALCIUM 8.1 (L) 07/12/2020    PROT 5.8 (L) 07/10/2020    LABALBU 4.0 07/09/2020    BILITOT 0.68 07/09/2020    ALKPHOS 95 07/09/2020    AST 17 07/09/2020    ALT 10 07/09/2020    LABGLOM 50 (L) 07/12/2020    GFRAA >60 07/12/2020    GLOB NOT REPORTED 01/25/2020     TTE procedure:2D Echocardiogram, M-Mode, Doppler, Color Doppler. Procedure Date  Date: 07/15/2020  CONCLUSIONS     Left ventricle is normal in size. Global left ventricular systolic function  is mildly reduced with Calculated EF via heart model is 41 %. There is basal inferolateral wall akinesis. Mild left ventricular hypertrophy. Grade I (mild) left ventricular diastolic dysfunction. Mild mitral regurgitation. Trivial tricuspid regurgitation. Insignificant tricuspid regurgitation, unable to estimate RVSP. Trivial pulmonic insufficiency. Physical Exam  Vitals signs reviewed. Constitutional:       Appearance: He is well-developed. HENT:      Head: Normocephalic and atraumatic. Mouth/Throat:      Mouth: Mucous membranes are moist.   Eyes:      General:         Right eye: No discharge. Left eye: No discharge. Pupils: Pupils are equal, round, and reactive to light. Neck:      Musculoskeletal: Neck supple. Cardiovascular:      Rate and Rhythm: Normal rate and regular rhythm. Heart sounds: No murmur. Pulmonary:      Effort: Pulmonary effort is normal.      Breath sounds: Normal breath sounds. Abdominal:      General: Bowel sounds are normal.      Palpations: Abdomen is soft. Musculoskeletal:      Comments: BLANCA x4   Skin:     General: Skin is warm and dry. Neurological:      Mental Status: He is alert and oriented to person, place, and time. Cranial Nerves: No cranial nerve deficit. Motor: No tremor. Coordination: Coordination normal.   Psychiatric:         Behavior: Behavior normal.      Comments: Lack of insight into his health             Assessment/Plan:  1. Essential hypertension  Blood pressure at goal today. Continue to hold ACE inhibitor and Lasix until repeat BMP. - Basic Metabolic Panel; Future    2. LEVI (acute kidney injury) [facility])  Patient states improving symptoms, no further fatigue. Making urine. Is not yet followed up with Dr. Papo Schwarz, referral placed. - Basic Metabolic Panel; Future  - AFL([facility]) - Shay Kothari MD, Nephrology, [address] CURRENT OUTPATIENT MED LIST    3. Memory loss  Continue increasing medication for goal of 20 mg a day. Currently well-tolerated. - memantine (NAMENDA) 5 MG tablet; Take 1 tablet by mouth 2 times daily  Dispense: 60 tablet; Refill: 1    4. Chronic systolic CHF (congestive heart failure) (HCC)  Echocardiogram results reviewed, slight decrease in ejection fraction to 41% compared to January 2019 reading of 45%. Mild LVH present. Will restart beta-blocker at this time. - AFL - Endy Elkins DO, Cardiology, [facility]  - metoprolol tartrate (LOPRESSOR) 25 MG tablet; Take 0.5 tablets by mouth 2 times daily  Dispense: 60 tablet;  Refill: 5        Medical Decision Making: moderate complexity

## 2020-07-27 ENCOUNTER — CARE COORDINATION (OUTPATIENT)
Dept: CASE MANAGEMENT | Age: 65
End: 2020-07-27

## 2020-07-27 NOTE — CARE COORDINATION
[facility] Transitions Follow Up Call    2020    Patient: Rossy Koch.  Patient : 1955   MRN: [id number]  Reason for Admission: ARF  Discharge Date: 20 RARS: Readmission Risk Score: 22         Attempted to reach patient for subsequent transitional call. VM left to return call to [phone]. 1st attempt.          Follow Up  Future Appointments   Date Time Provider [place] Barber   2020  1:15 PM CESAR Kearns - CNP [facility] WALK IN [facility], [place]

## 2020-07-28 ENCOUNTER — CARE COORDINATION (OUTPATIENT)
Dept: CASE MANAGEMENT | Age: 65
End: 2020-07-28

## 2020-08-04 ENCOUNTER — CARE COORDINATION (OUTPATIENT)
Dept: CASE MANAGEMENT | Age: 65
End: 2020-08-04

## 2020-08-04 NOTE — CARE COORDINATION
[facility] Transitions Follow Up Call    2020    Patient: Bonita Huertas.  Patient : 1955   MRN: [id number]  Reason for Admission: ARF  Discharge Date: 20 RARS: Readmission Risk Score: 25         Spoke with: Ty, patient's girlfriend and with patient    Spoke with both patient and his significant other, Ty. He states he feels well and having no issues. Per girlfriend he has been eating and drinking well and having no new issues. Patient is confused at times but is at his baseline. They deny any needs or concerns. Expressed this was final call, episode resolved. Care Transitions Subsequent and Final Call    Schedule Follow Up Appointment with PCP:  Completed  Subsequent and Final Calls  Do you have any ongoing symptoms?:  No  Have your medications changed?:  No  Do you have any questions related to your medications?:  No  Do you currently have any active services?:  No  Do you have any needs or concerns that I can assist you with?:  No  Identified Barriers:  Lack of Education  Care Transitions Interventions  Other Interventions:             Follow Up  Future Appointments   Date Time Provider [place] Barber   2020  1:15 PM Ethan Rayo, APRN - CNP [facility] WALK IN [place] Holland RN

## 2020-08-19 ENCOUNTER — OFFICE VISIT (OUTPATIENT)
Dept: PRIMARY CARE CLINIC | Age: 65
End: 2020-08-19
Payer: MEDICARE

## 2020-08-19 VITALS
WEIGHT: 183 LBS | OXYGEN SATURATION: 96 % | BODY MASS INDEX: 27.02 KG/M2 | SYSTOLIC BLOOD PRESSURE: 129 MMHG | DIASTOLIC BLOOD PRESSURE: 80 MMHG | TEMPERATURE: 97.4 F | HEART RATE: 80 BPM

## 2020-08-19 PROCEDURE — 99213 OFFICE O/P EST LOW 20 MIN: CPT | Performed by: NURSE PRACTITIONER

## 2020-08-19 RX ORDER — OMEPRAZOLE 20 MG/1
20 CAPSULE, DELAYED RELEASE ORAL DAILY
COMMUNITY

## 2020-08-19 RX ORDER — FUROSEMIDE 20 MG/1
20 TABLET ORAL DAILY
COMMUNITY

## 2020-08-19 RX ORDER — ATORVASTATIN CALCIUM 40 MG/1
TABLET, FILM COATED ORAL
COMMUNITY
Start: 2020-08-15 | End: 2020-08-19

## 2020-08-19 ASSESSMENT — ENCOUNTER SYMPTOMS
EYE PAIN: 0
VOMITING: 0
WHEEZING: 0
TROUBLE SWALLOWING: 0
PHOTOPHOBIA: 0
DIARRHEA: 0
SHORTNESS OF BREATH: 0
BLOOD IN STOOL: 0
NAUSEA: 0
ABDOMINAL PAIN: 0
EYE REDNESS: 0
APNEA: 0
CHEST TIGHTNESS: 0

## 2020-08-19 NOTE — PATIENT INSTRUCTIONS
[facility] [address], [place] [address]   [facility], [address] [place] [address], [facility]. [address]   [place], [place], [address]

## 2020-08-19 NOTE — PROGRESS NOTES
[facility]  [id number] [facility] [address]  Dept: [phone]  Dept Fax: [phone]    Patient Care Team:  CESAR Irizarry CNP as PCP - General (Family Medicine)  CESAR Irizarry CNP as PCP - [address] [place] [address] Provider  Marquis Tate MD as Consulting Physician (Gastroenterology)  Tariq Tao MD as Consulting Physician (Hematology and Oncology)    2020     Laurence Sylvia Davis (:  [id number])EF a 76 y.o. male, here for evaluation of the following medical concerns:   Chief Complaint   Patient presents with    1 Month Follow-Up     HTN        Ricky Trish Chencho. is here for a follow-up for his blood pressure. He states he is currently taking his medications as prescribed, his girlfriend Ty also helps him with his pills. No recent falls, no complaints of dizziness. His significant other has not made appointments for him with cardiology or nephrology at this time. .    Review of Systems   Constitutional: Negative for appetite change, chills, diaphoresis, fatigue, fever and unexpected weight change. HENT: Negative for hearing loss and trouble swallowing. Eyes: Negative for photophobia, pain, redness and visual disturbance. Respiratory: Negative for apnea, chest tightness, shortness of breath and wheezing. Snoring   Cardiovascular: Negative for chest pain, palpitations and leg swelling. Gastrointestinal: Negative for abdominal pain, blood in stool, diarrhea, nausea and vomiting. Endocrine: Negative for polydipsia and polyuria. Genitourinary: Negative for difficulty urinating, frequency and hematuria. Musculoskeletal: Negative for myalgias and neck pain. Skin: Negative for rash. Neurological: Negative for dizziness, seizures, syncope, numbness and headaches. Psychiatric/Behavioral: Positive for confusion. Negative for suicidal ideas.  The patient is not nervous/anxious. Prior to Visit Medications    Medication Sig Taking? Authorizing Provider   furosemide (LASIX) 20 MG tablet Take 20 mg by mouth daily Yes Historical Provider, MD   omeprazole (PRILOSEC) 20 MG delayed release capsule Take 20 mg by mouth daily Yes Historical Provider, MD   memantine (NAMENDA) 5 MG tablet Take 1 tablet by mouth 2 times daily Yes CESAR Adame CNP   metoprolol tartrate (LOPRESSOR) 25 MG tablet Take 0.5 tablets by mouth 2 times daily Yes CESAR Adame CNP   atorvastatin (LIPITOR) 80 MG tablet Take 0.5 tablets by mouth nightly Yes Melina Harris MD   magnesium oxide (MAG-OX) 400 MG tablet Take 400 mg by mouth daily Yes Historical Provider, MD   Acetaminophen (TYLENOL EXTRA STRENGTH PO) Take by mouth Yes Historical Provider, MD   Cyanocobalamin ER (RA VITAMIN B-12 TR) 1000 MCG TBCR take 1 tablet by mouth once daily as directed Yes CESAR Adame CNP   nicotine (Jullie Grumbles) 21 MG/24HR Place 1 patch onto the skin daily as needed (if patient smokes)  Patient not taking: Reported on 7/22/2020  Melina Harris MD RA ASPIRIN ADULT LOW DOSE 81 MG chewable tablet chew and swallow 1 tablet by mouth once daily  Patient not taking: Reported on 7/22/2020  CESAR Adame CNP        Social History     Tobacco Use    Smoking status: Current Every Day Smoker     Packs/day: 1.00     Years: 30.00     Pack years: 30.00     Types: Cigarettes    Smokeless tobacco: Never Used   Substance Use Topics    Alcohol use: Not Currently        Vitals:    08/19/20 1300   BP: 129/80   Site: Left Upper Arm   Position: Sitting   Cuff Size: Medium Adult   Pulse: 80   Temp: 97.4 °F (36.3 °C)   SpO2: 96%   Weight: 183 lb (83 kg)     Estimated body mass index is 27.02 kg/m² as calculated from the following:    Height as of 7/9/20: 5' 9\" (1.753 m). Weight as of this encounter: 183 lb (83 kg).     DIAGNOSTIC FINDINGS:  CBC:  Lab Results   Component Value Date    WBC 7.3 07/12/2020 HGB 11.6 07/12/2020     07/12/2020     09/20/2011       BMP:    Lab Results   Component Value Date     07/22/2020    K 3.5 07/22/2020     07/22/2020    CO2 27 07/22/2020    BUN 10 07/22/2020    CREATININE 1.21 07/22/2020    GLUCOSE 91 07/22/2020    GLUCOSE 116 09/20/2011       HEMOGLOBIN A1C:   Lab Results   Component Value Date    LABA1C 6.4 07/10/2020       FASTING LIPID PANEL:  Lab Results   Component Value Date    CHOL 178 01/26/2020    HDL 30 (L) 01/26/2020    TRIG 82 01/26/2020       Physical Exam  Vitals signs reviewed. Constitutional:       Appearance: He is well-developed. HENT:      Head: Normocephalic and atraumatic. Mouth/Throat:      Mouth: Mucous membranes are moist.   Eyes:      General:         Right eye: No discharge. Left eye: No discharge. Pupils: Pupils are equal, round, and reactive to light. Neck:      Musculoskeletal: Neck supple. Cardiovascular:      Rate and Rhythm: Normal rate and regular rhythm. Heart sounds: No murmur. Pulmonary:      Effort: Pulmonary effort is normal.      Breath sounds: Normal breath sounds. Abdominal:      General: Bowel sounds are normal.      Palpations: Abdomen is soft. Musculoskeletal:      Comments: BLANCA x4   Skin:     General: Skin is warm and dry. Neurological:      Mental Status: He is alert and oriented to person, place, and time. Cranial Nerves: No cranial nerve deficit. Motor: No tremor. Coordination: Coordination normal.   Psychiatric:         Behavior: Behavior normal.      Comments: Lack of insight into his health         ASSESSMENT     Diagnosis Orders   1. Essential hypertension            PLAN:  No orders of the defined types were placed in this encounter. 1. No medication changes made at this time. 2. Numbers for nephrology and cardiology provided again, I encouraged his significant other to call and help make appointments.   3. At last check creatinine was improving, down to 1.2, which is about his baseline    FOLLOW UP AND INSTRUCTIONS:  Return in about 6 months (around 2/19/2021) for HTN, dementia. · Ricky received counseling on the following healthy behaviors:medication adherence and tobacco cessation    · Discussed use, benefit, and side effects of prescribed medications. Barriers to  medication compliance addressed. All patient questions answered. Pt  verbalized understanding of all instructions given. · Patient given educational materials - see patient instructions      · Patient advised to contact scheduling offices for any referrals or imaging orders  placed today if they have not been contacted in 48 hours. Return in about 6 months (around 2/19/2021) for HTN, dementia. An electronic signature was used to authenticate this note. --CESAR Villavicencio CNP on 8/19/2020 at 2:27 PM  Visit Information    Have you changed or started any medications since your last visit including any over-the-counter medicines, vitamins, or herbal medicines? no   Are you having any side effects from any of your medications? -  no  Have you stopped taking any of your medications? Is so, why? -  no    Have you seen any other physician or provider since your last visit? No  Have you had any other diagnostic tests since your last visit? No  Have you been seen in the emergency room and/or had an admission to a hospital since we last saw you? No  Have you had your routine dental cleaning in the past 6 months? no    Have you activated your [employer] account? If not, what are your barriers?  Yes     Patient Care Team:  CESAR Villavicencio CNP as PCP - General (Family Medicine)  CESAR Villavicencio [facility]NP [facility] PCP [facility]  Conor Rich MD as Consulting Physician (Gastroenterology)  Mer Casey MD as Consulting Physician (Hematology and Oncology)    Medical History Review  Past Medical, Family, and Social History reviewed and does not contribute to the patient presenting condition    Health Maintenance   Topic Date Due    AAA screen  1955    Hepatitis C screen  1955    Low dose CT lung screening  04/04/2009    Colon cancer screen colonoscopy  03/10/2019    Pneumococcal 65+ years Vaccine (1 of 1 - PPSV23) 04/04/2019    Annual Wellness Visit (AWV)  06/23/2019    Shingles Vaccine (1 of 2) 08/30/2020 (Originally 4/4/2004)    Flu vaccine (1) 09/01/2020    Lipid screen  01/26/2021    A1C test (Diabetic or Prediabetic)  07/10/2021    Potassium monitoring  07/22/2021    Creatinine monitoring  07/22/2021    DTaP/Tdap/Td vaccine (2 - Td) 12/31/2028    Hepatitis A vaccine  Aged Out    Hepatitis B vaccine  Aged Out    Hib vaccine  Aged Out    Meningococcal (ACWY) vaccine  Aged Out

## 2020-11-05 RX ORDER — ATORVASTATIN CALCIUM 40 MG/1
TABLET, FILM COATED ORAL
Qty: 30 TABLET | Refills: 2 | Status: SHIPPED | OUTPATIENT
Start: 2020-11-05 | End: 2021-05-24

## 2020-11-05 NOTE — TELEPHONE ENCOUNTER
Health Maintenance   Topic Date Due    AAA screen  1955    Hepatitis C screen  1955    Shingles Vaccine (1 of 2) 04/04/2004    Low dose CT lung screening  04/04/2009    Colon cancer screen colonoscopy  03/10/2019    Pneumococcal 65+ years Vaccine (1 of 1 - PPSV23) 04/04/2019    Annual Wellness Visit (AWV)  06/23/2019    Flu vaccine (1) 09/01/2020    Lipid screen  01/26/2021    A1C test (Diabetic or Prediabetic)  07/10/2021    Potassium monitoring  07/22/2021    Creatinine monitoring  07/22/2021    DTaP/Tdap/Td vaccine (2 - Td) 12/31/2028    Hepatitis A vaccine  Aged Out    Hepatitis B vaccine  Aged Out    Hib vaccine  Aged Out    Meningococcal (ACWY) vaccine  Aged Out             (applicable per patient's age: Cancer Screenings, Depression Screening, Fall Risk Screening, Immunizations)    Hemoglobin A1C (%)   Date Value   07/10/2020 6.4 (H)   01/26/2020 6.3 (H)   01/24/2019 6.0     LDL Cholesterol (mg/dL)   Date Value   01/26/2020 132 (H)     AST (U/L)   Date Value   07/09/2020 17     ALT (U/L)   Date Value   07/09/2020 10     BUN (mg/dL)   Date Value   07/22/2020 10      (goal A1C is < 7)   (goal LDL is <100) need 30-50% reduction from baseline     BP Readings from Last 3 Encounters:   08/19/20 129/80   07/22/20 130/79   07/12/20 (!) 124/52    (goal /80)      All Future Testing planned in [facility]:  Lab Frequency Next Occurrence   CT Lung Screen (Annual) Once 02/28/2021   Ammonia Once 85/42/6442   Basic Metabolic Panel Once 31/60/0768       Next Visit Date:  Future Appointments   Date Time Provider [place] Barber   2/24/2021  1:15 PM CESAR Lozada - CNP [facility]            Patient Active Problem List:     Hyperlipidemia     GERD (gastroesophageal reflux disease)     Sigmoid polyp     Gastric ulcer     Mild malnutrition ([address]     Tobacco use     Colon polyp     Coronary artery disease involving native coronary artery of native heart without angina pectoris

## 2021-02-21 DIAGNOSIS — E53.8 VITAMIN B12 DEFICIENCY (NON ANEMIC): ICD-10-CM

## 2021-02-21 DIAGNOSIS — R76.0 ANTICARDIOLIPIN ANTIBODY POSITIVE: ICD-10-CM

## 2021-02-21 NOTE — TELEPHONE ENCOUNTER
Health Maintenance   Topic Date Due    AAA screen  1955    Hepatitis C screen  1955    COVID-19 Vaccine (1 of 2) 04/04/1970    Shingles Vaccine (1 of 2) 04/04/2004    Low dose CT lung screening  04/04/2009    Colon cancer screen colonoscopy  03/10/2019    Pneumococcal 65+ years Vaccine (1 of 1 - PPSV23) 04/04/2019    Annual Wellness Visit (AWV)  06/23/2019    Flu vaccine (1) 09/01/2020    Lipid screen  01/26/2021    A1C test (Diabetic or Prediabetic)  07/10/2021    Potassium monitoring  07/22/2021    Creatinine monitoring  07/22/2021    DTaP/Tdap/Td vaccine (2 - Td) 12/31/2028    Hepatitis A vaccine  Aged Out    Hepatitis B vaccine  Aged Out    Hib vaccine  Aged Out    Meningococcal (ACWY) vaccine  Aged Out             (applicable per patient's age: Cancer Screenings, Depression Screening, Fall Risk Screening, Immunizations)    Hemoglobin A1C (%)   Date Value   07/10/2020 6.4 (H)   01/26/2020 6.3 (H)   01/24/2019 6.0     LDL Cholesterol (mg/dL)   Date Value   01/26/2020 132 (H)     AST (U/L)   Date Value   07/09/2020 17     ALT (U/L)   Date Value   07/09/2020 10     BUN (mg/dL)   Date Value   07/22/2020 10      (goal A1C is < 7)   (goal LDL is <100) need 30-50% reduction from baseline     BP Readings from Last 3 Encounters:   08/19/20 129/80   07/22/20 130/79   07/12/20 (!) 124/52    (goal /80)      All Future Testing planned in [facility]:  Lab Frequency Next Occurrence   CT Lung Screen (Annual) Once 66/45/3037   Basic Metabolic Panel Once 76/54/2691       Next Visit Date:  Future Appointments   Date Time Provider Indiana Barber   2/24/2021  1:15 PM CESAR Nunn - CNP [facility]            Patient Active Problem List:     Hyperlipidemia     GERD (gastroesophageal reflux disease)     Sigmoid polyp     Gastric ulcer     Mild malnutrition ([address]     Tobacco use     Colon polyp     Coronary artery disease involving native coronary artery of native heart without angina pectoris     Benign essential HTN     AVM (arteriovenous malformation) of duodenum, acquired     Adenomatous colon polyp     PUD (peptic ulcer disease)     Diverticulosis of large intestine without hemorrhage     Arthritis of knee     Multi-infarct state     Left homonymous hemianopsia     Occipital infarction (HCC)     Prolonged Q-T interval on ECG     Memory loss of unknown cause     Visual disturbance     H/O: CVA (cerebrovascular accident)     Cerebellar infarct ([address]     Left temporal lobe infarction (HCC)     Cataracts, bilateral     Internal carotid artery stenosis, right: 50 to 69%     Hypertensive encephalopathy     Chronic systolic CHF (congestive heart failure) (HCC)     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     Acute renal failure (ARF) (HCC)     Metabolic acidosis     Volume depletion

## 2021-02-22 RX ORDER — PSYLLIUM HUSK 3.4 G/7G
POWDER ORAL
Qty: 30 TABLET | Refills: 3 | Status: SHIPPED | OUTPATIENT
Start: 2021-02-22 | End: 2021-06-07 | Stop reason: SDUPTHER

## 2021-04-09 RX ORDER — ACETAMINOPHEN 160 MG
TABLET,CHEWABLE ORAL
Qty: 30 TABLET | Refills: 3 | Status: SHIPPED | OUTPATIENT
Start: 2021-04-09 | End: 2021-06-07 | Stop reason: SDUPTHER

## 2021-04-09 NOTE — TELEPHONE ENCOUNTER
E-scribe request for. Please review and e-scribe if applicable.      Last Visit Date:    Next Visit Date:  Visit date not found    Hemoglobin A1C (%)   Date Value   07/10/2020 6.4 (H)   01/26/2020 6.3 (H)   01/24/2019 6.0             ( goal A1C is < 7)   No results found for: LABMICR  LDL Cholesterol (mg/dL)   Date Value   01/26/2020 132 (H)       (goal LDL is <100)   AST (U/L)   Date Value   07/09/2020 17     ALT (U/L)   Date Value   07/09/2020 10     BUN (mg/dL)   Date Value   07/22/2020 10     BP Readings from Last 3 Encounters:   08/19/20 129/80   07/22/20 130/79   07/12/20 (!) 124/52          (goal 120/80)        Patient Active Problem List:     Hyperlipidemia     GERD (gastroesophageal reflux disease)     Sigmoid polyp     Gastric ulcer     Mild malnutrition (HCC)     Tobacco use     Colon polyp     Coronary artery disease involving native coronary artery of native heart without angina pectoris     Benign essential HTN     AVM (arteriovenous malformation) of duodenum, acquired     Adenomatous colon polyp     PUD (peptic ulcer disease)     Diverticulosis of large intestine without hemorrhage     Arthritis of knee     Multi-infarct state     Left homonymous hemianopsia     Occipital infarction ([address]     Prolonged Q-T interval on ECG     Memory loss of unknown cause     Visual disturbance     H/O: CVA (cerebrovascular accident)     Cerebellar infarct ([address]     Left temporal lobe infarction ([address]     Cataracts, bilateral     Internal carotid artery stenosis, right: 50 to 69%     Hypertensive encephalopathy     Chronic systolic CHF (congestive heart failure) ([facility])     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     Acute renal failure (ARF) ([facility])     Metabolic acidosis     Volume depletion      ----Bhavesh Render

## 2021-05-24 ENCOUNTER — TELEPHONE (OUTPATIENT)
Dept: PRIMARY CARE CLINIC | Age: 66
End: 2021-05-24

## 2021-05-24 RX ORDER — ATORVASTATIN CALCIUM 40 MG/1
TABLET, FILM COATED ORAL
Qty: 30 TABLET | Refills: 2 | Status: SHIPPED | OUTPATIENT
Start: 2021-05-24 | End: 2022-02-07 | Stop reason: SDUPTHER

## 2021-05-25 ENCOUNTER — TELEPHONE (OUTPATIENT)
Dept: PRIMARY CARE CLINIC | Age: 66
End: 2021-05-25

## 2021-06-07 ENCOUNTER — OFFICE VISIT (OUTPATIENT)
Dept: PRIMARY CARE CLINIC | Age: 66
End: 2021-06-07
Payer: MEDICARE

## 2021-06-07 VITALS
TEMPERATURE: 97.5 F | OXYGEN SATURATION: 98 % | SYSTOLIC BLOOD PRESSURE: 105 MMHG | BODY MASS INDEX: 27.64 KG/M2 | DIASTOLIC BLOOD PRESSURE: 71 MMHG | WEIGHT: 187.2 LBS | HEART RATE: 84 BPM

## 2021-06-07 DIAGNOSIS — R41.3 MEMORY LOSS: ICD-10-CM

## 2021-06-07 DIAGNOSIS — I50.22 CHRONIC SYSTOLIC CHF (CONGESTIVE HEART FAILURE) (HCC): ICD-10-CM

## 2021-06-07 DIAGNOSIS — Z13.220 SCREENING FOR HYPERLIPIDEMIA: ICD-10-CM

## 2021-06-07 DIAGNOSIS — Z13.6 ENCOUNTER FOR ABDOMINAL AORTIC ANEURYSM (AAA) SCREENING: ICD-10-CM

## 2021-06-07 DIAGNOSIS — R76.0 ANTICARDIOLIPIN ANTIBODY POSITIVE: ICD-10-CM

## 2021-06-07 DIAGNOSIS — I10 ESSENTIAL HYPERTENSION: Primary | ICD-10-CM

## 2021-06-07 DIAGNOSIS — Z72.0 TOBACCO USE: ICD-10-CM

## 2021-06-07 DIAGNOSIS — Z12.11 SCREENING FOR COLON CANCER: ICD-10-CM

## 2021-06-07 DIAGNOSIS — E53.8 VITAMIN B12 DEFICIENCY (NON ANEMIC): ICD-10-CM

## 2021-06-07 DIAGNOSIS — R73.03 PREDIABETES: ICD-10-CM

## 2021-06-07 LAB — HBA1C MFR BLD: 6.2 %

## 2021-06-07 PROCEDURE — 83036 HEMOGLOBIN GLYCOSYLATED A1C: CPT | Performed by: NURSE PRACTITIONER

## 2021-06-07 PROCEDURE — 99214 OFFICE O/P EST MOD 30 MIN: CPT | Performed by: NURSE PRACTITIONER

## 2021-06-07 RX ORDER — PSYLLIUM HUSK 3.4 G/7G
POWDER ORAL
Qty: 30 TABLET | Refills: 3 | Status: SHIPPED | OUTPATIENT
Start: 2021-06-07

## 2021-06-07 RX ORDER — ASPIRIN 81 MG/1
TABLET, CHEWABLE ORAL
Qty: 30 TABLET | Refills: 3 | Status: SHIPPED | OUTPATIENT
Start: 2021-06-07

## 2021-06-07 RX ORDER — MEMANTINE HYDROCHLORIDE 5 MG/1
TABLET ORAL
Qty: 60 TABLET | Refills: 3 | Status: SHIPPED | OUTPATIENT
Start: 2021-06-07 | End: 2022-02-07 | Stop reason: SDUPTHER

## 2021-06-07 SDOH — ECONOMIC STABILITY: FOOD INSECURITY: WITHIN THE PAST 12 MONTHS, YOU WORRIED THAT YOUR FOOD WOULD RUN OUT BEFORE YOU GOT MONEY TO BUY MORE.: NEVER TRUE

## 2021-06-07 SDOH — ECONOMIC STABILITY: FOOD INSECURITY: WITHIN THE PAST 12 MONTHS, THE FOOD YOU BOUGHT JUST DIDN'T LAST AND YOU DIDN'T HAVE MONEY TO GET MORE.: NEVER TRUE

## 2021-06-07 ASSESSMENT — PATIENT HEALTH QUESTIONNAIRE - PHQ9
SUM OF ALL RESPONSES TO PHQ QUESTIONS 1-9: 0
1. LITTLE INTEREST OR PLEASURE IN DOING THINGS: 0
SUM OF ALL RESPONSES TO PHQ9 QUESTIONS 1 & 2: 0
SUM OF ALL RESPONSES TO PHQ QUESTIONS 1-9: 0
SUM OF ALL RESPONSES TO PHQ QUESTIONS 1-9: 0
2. FEELING DOWN, DEPRESSED OR HOPELESS: 0

## 2021-06-07 ASSESSMENT — ENCOUNTER SYMPTOMS
TROUBLE SWALLOWING: 0
WHEEZING: 0
BLOOD IN STOOL: 0
CHEST TIGHTNESS: 0
SHORTNESS OF BREATH: 0
EYE REDNESS: 0
ABDOMINAL PAIN: 0
PHOTOPHOBIA: 0
NAUSEA: 0
EYE PAIN: 0
DIARRHEA: 0
APNEA: 0
VOMITING: 0

## 2021-06-07 ASSESSMENT — SOCIAL DETERMINANTS OF HEALTH (SDOH): HOW HARD IS IT FOR YOU TO PAY FOR THE VERY BASICS LIKE FOOD, HOUSING, MEDICAL CARE, AND HEATING?: NOT HARD AT ALL

## 2021-06-07 NOTE — PROGRESS NOTES
Endocrine: Negative for polydipsia and polyuria. Genitourinary: Negative for difficulty urinating, discharge, frequency and hematuria. Musculoskeletal: Negative for myalgias and neck pain. Skin: Negative for rash. Neurological: Negative for dizziness, seizures, syncope, numbness and headaches. Psychiatric/Behavioral: Positive for confusion. Negative for suicidal ideas. The patient is not nervous/anxious. Prior to Visit Medications    Medication Sig Taking?  Authorizing Provider   metoprolol tartrate (LOPRESSOR) 25 MG tablet Take 0.5 tablets by mouth 2 times daily Yes CESAR Desouza CNP   memantine (NAMENDA) 5 MG tablet take 1 tablet by mouth twice a day Yes CESAR Desouza CNP   aspirin ( ASPIRIN ADULT LOW STRENGTH) 81 MG chewable tablet chew and swallow 1 tablet by mouth once daily Yes CESAR Desouza CNP   Cyanocobalamin ER ( VITAMIN B-12 TR) 1000 MCG TBCR take 1 tablet by mouth once daily Yes CESAR Desouza CNP   atorvastatin (LIPITOR) 40 MG tablet take 1 tablet by mouth at bedtime Yes CESAR Desouza CNP   furosemide (LASIX) 20 MG tablet Take 20 mg by mouth daily Yes Historical Provider, MD   omeprazole (PRILOSEC) 20 MG delayed release capsule Take 20 mg by mouth daily Yes Historical Provider, MD   magnesium oxide (MAG-OX) 400 MG tablet Take 400 mg by mouth daily Yes Historical Provider, MD   Acetaminophen (TYLENOL EXTRA STRENGTH PO) Take by mouth Yes Historical Provider, MD        Social History     Tobacco Use    Smoking status: Current Every Day Smoker     Packs/day: 1.00     Years: 30.00     Pack years: 30.00     Types: Cigarettes    Smokeless tobacco: Never Used   Substance Use Topics    Alcohol use: Not Currently        Vitals:    06/07/21 1533   BP: 105/71   Site: Left Upper Arm   Position: Sitting   Cuff Size: Medium Adult   Pulse: 84   Temp: 97.5 °F (36.4 °C)   TempSrc: Temporal   SpO2: 98%   Weight: 187 lb 3.2 oz (84.9 kg)     Estimated body mass index is 27.64 kg/m² as calculated from the following:    Height as of 7/9/20: 5' 9\" (1.753 m). Weight as of this encounter: 187 lb 3.2 oz (84.9 kg). DIAGNOSTIC FINDINGS:  CBC:  Lab Results   Component Value Date    WBC 7.3 07/12/2020    HGB 11.6 07/12/2020     07/12/2020     09/20/2011       BMP:    Lab Results   Component Value Date     07/22/2020    K 3.5 07/22/2020     07/22/2020    CO2 27 07/22/2020    BUN 10 07/22/2020    CREATININE 1.21 07/22/2020    GLUCOSE 91 07/22/2020    GLUCOSE 116 09/20/2011       HEMOGLOBIN A1C:   Lab Results   Component Value Date    LABA1C 6.2 06/07/2021       FASTING LIPID PANEL:  Lab Results   Component Value Date    CHOL 178 01/26/2020    HDL 30 (L) 01/26/2020    TRIG 82 01/26/2020       Physical Exam  Vitals reviewed. Constitutional:       Appearance: He is well-developed. He is not ill-appearing. HENT:      Head: Normocephalic and atraumatic. Mouth/Throat:      Mouth: Mucous membranes are moist.   Eyes:      General:         Right eye: No discharge. Left eye: No discharge. Pupils: Pupils are equal, round, and reactive to light. Cardiovascular:      Rate and Rhythm: Normal rate and regular rhythm. Heart sounds: No murmur heard. Pulmonary:      Effort: Pulmonary effort is normal.      Breath sounds: Normal breath sounds. Abdominal:      General: Bowel sounds are normal.      Palpations: Abdomen is soft. Musculoskeletal:      Cervical back: Neck supple. Right lower leg: No edema. Left lower leg: No edema. Comments: BLANCA x4   Skin:     General: Skin is warm and dry. Neurological:      Mental Status: He is alert and oriented to person, place, and time. Cranial Nerves: No cranial nerve deficit. Motor: No tremor.       Coordination: Coordination normal.   Psychiatric:         Behavior: Behavior normal.      Comments: Lack of insight into his health         ASSESSMENT     Diagnosis Orders   1. Essential hypertension  Comprehensive Metabolic Panel, Fasting    aspirin (RA ASPIRIN ADULT LOW STRENGTH) 81 MG chewable tablet   2. Screening for colon cancer  POCT FECAL IMMUNOCHEMICAL TEST (FIT)   3. Screening for hyperlipidemia  Lipid, Fasting   4. Prediabetes  POCT glycosylated hemoglobin (Hb A1C)   5. Memory loss  memantine (NAMENDA) 5 MG tablet   6. Chronic systolic CHF (congestive heart failure) (HCC)  metoprolol tartrate (LOPRESSOR) 25 MG tablet   7. Encounter for abdominal aortic aneurysm (AAA) screening   SCREENING FOR AAA   8. Vitamin B12 deficiency (non anemic)  Cyanocobalamin ER (RA VITAMIN B-12 TR) 1000 MCG TBCR   9. Anticardiolipin antibody positive  Cyanocobalamin ER (RA VITAMIN B-12 TR) 1000 MCG TBCR   10. Tobacco use            PLAN:  Orders Placed This Encounter   Medications    metoprolol tartrate (LOPRESSOR) 25 MG tablet     Sig: Take 0.5 tablets by mouth 2 times daily     Dispense:  60 tablet     Refill:  5    memantine (NAMENDA) 5 MG tablet     Sig: take 1 tablet by mouth twice a day     Dispense:  60 tablet     Refill:  3    aspirin (RA ASPIRIN ADULT LOW STRENGTH) 81 MG chewable tablet     Sig: chew and swallow 1 tablet by mouth once daily     Dispense:  30 tablet     Refill:  3    Cyanocobalamin ER (RA VITAMIN B-12 TR) 1000 MCG TBCR     Sig: take 1 tablet by mouth once daily     Dispense:  30 tablet     Refill:  3         1. Medication refills provided today. Blood pressure currently at goal.  2. Lungs clear bilaterally, weight remains stable. 3. Recent history of acute kidney injury, has not followed up with nephrology as advised by PCP. Labs ordered to monitor kidney function today. 4. Contact  for possible resources in regards to compliance with specialty visits. Discussed utilizing a wall calendar refrigerator notes to recall appointments and schedules.   Unfortunately, patient's girlfriend maintains he is unwilling to go unless she schedules and attends appointments. 5. Discussed prediabetes, encouraged monitoring processed food intake. Limit sugary drinks and snacks. FOLLOW UP AND INSTRUCTIONS:  Return in about 4 months (around 10/7/2021) for HTN. · Ricky received counseling on the following healthy behaviors:nutrition, medication adherence and tobacco cessation    · Discussed use, benefit, and side effects of prescribed medications. Barriers to  medication compliance addressed. All patient questions answered. Pt  verbalized understanding of all instructions given. · Patient given educational materials - see patient instructions      · Patient advised to contact scheduling offices for any referrals or imaging orders  placed today if they have not been contacted in 48 hours. Return in about 4 months (around 10/7/2021) for HTN. An electronic signature was used to authenticate this note. --CESAR Keyes CNP on 6/7/2021 at 4:45 PM  Visit Information    Have you changed or started any medications since your last visit including any over-the-counter medicines, vitamins, or herbal medicines? no   Are you having any side effects from any of your medications? -  no  Have you stopped taking any of your medications? Is so, why? -  no    Have you seen any other physician or provider since your last visit? No  Have you had any other diagnostic tests since your last visit? No  Have you been seen in the emergency room and/or had an admission to a hospital since we last saw you? No  Have you had your routine dental cleaning in the past 6 months? no    Have you activated your [employer] account? If not, what are your barriers?  Yes     Patient Care Team:  CESAR Keyes CNP as PCP - General (Family Medicine)  CESAR Keyes CNP as PCP - [facility]  Fran Calixto MD as Consulting Physician (Gastroenterology)  Alysa Hylton MD as Consulting Physician (Hematology and Oncology)    Medical History Review  Past Medical, Family, and Social History reviewed and does not contribute to the patient presenting condition    Health Maintenance   Topic Date Due    AAA screen  Never done    Hepatitis C screen  Never done    COVID-19 Vaccine (1) Never done    Shingles Vaccine (1 of 2) Never done    Low dose CT lung screening  Never done    Colon cancer screen colonoscopy  03/10/2019    Pneumococcal 65+ years Vaccine (1 of 1 - PPSV23) Never done   [employer] Visit (AWV)  Never done    Lipid screen  01/26/2021    Potassium monitoring  07/22/2021    Creatinine monitoring  07/22/2021    Flu vaccine (Season Ended) 09/01/2021    A1C test (Diabetic or Prediabetic)  06/07/2022    DTaP/Tdap/Td vaccine (2 - Td or Tdap) 12/31/2028    Hepatitis A vaccine  Aged Out    Hepatitis B vaccine  Aged Out    Hib vaccine  Aged Out    Meningococcal (ACWY) vaccine  Aged Out

## 2021-06-08 ENCOUNTER — CARE COORDINATION (OUTPATIENT)
Dept: CARE COORDINATION | Age: 66
End: 2021-06-08

## 2021-06-17 ENCOUNTER — CARE COORDINATION (OUTPATIENT)
Dept: CARE COORDINATION | Age: 66
End: 2021-06-17

## 2021-06-17 NOTE — CARE COORDINATION
Eladio Leavitt did not return CM's VM. It is noted his girl friend Alden transports and accompanies his to all his appointments. He apparently does not keep his appointments if Ty does not accompany him. CM discussed care coordination with Ty this morning. She declines care coordination at this time. Ty took CM's name and contact information should she feel care coordination would help Ricky in the future.

## 2022-01-10 ENCOUNTER — TELEPHONE (OUTPATIENT)
Dept: PRIMARY CARE CLINIC | Age: 67
End: 2022-01-10

## 2022-01-10 NOTE — TELEPHONE ENCOUNTER
coricidin hbp is what I would recommend, it is often recommended for adults with high blood pressure if it does not interact or have ingredients that would cause increased blood pressure

## 2022-02-07 ENCOUNTER — OFFICE VISIT (OUTPATIENT)
Dept: PRIMARY CARE CLINIC | Age: 67
End: 2022-02-07
Payer: MEDICARE

## 2022-02-07 VITALS
DIASTOLIC BLOOD PRESSURE: 76 MMHG | BODY MASS INDEX: 28.29 KG/M2 | TEMPERATURE: 98.2 F | WEIGHT: 191 LBS | SYSTOLIC BLOOD PRESSURE: 141 MMHG | HEIGHT: 69 IN | OXYGEN SATURATION: 99 % | HEART RATE: 80 BPM

## 2022-02-07 DIAGNOSIS — I10 ESSENTIAL HYPERTENSION: Primary | ICD-10-CM

## 2022-02-07 DIAGNOSIS — R41.3 MEMORY LOSS: ICD-10-CM

## 2022-02-07 DIAGNOSIS — R73.03 PREDIABETES: ICD-10-CM

## 2022-02-07 DIAGNOSIS — M17.10 ARTHRITIS OF KNEE: ICD-10-CM

## 2022-02-07 DIAGNOSIS — I50.22 CHRONIC SYSTOLIC CHF (CONGESTIVE HEART FAILURE) (HCC): ICD-10-CM

## 2022-02-07 PROCEDURE — 99213 OFFICE O/P EST LOW 20 MIN: CPT | Performed by: NURSE PRACTITIONER

## 2022-02-07 RX ORDER — ACETAMINOPHEN 325 MG/1
650 TABLET ORAL EVERY 6 HOURS PRN
Qty: 120 TABLET | Refills: 3 | Status: SHIPPED | OUTPATIENT
Start: 2022-02-07

## 2022-02-07 RX ORDER — MEMANTINE HYDROCHLORIDE 5 MG/1
TABLET ORAL
Qty: 60 TABLET | Refills: 3 | Status: SHIPPED | OUTPATIENT
Start: 2022-02-07

## 2022-02-07 RX ORDER — ATORVASTATIN CALCIUM 40 MG/1
TABLET, FILM COATED ORAL
Qty: 30 TABLET | Refills: 2 | Status: SHIPPED | OUTPATIENT
Start: 2022-02-07

## 2022-02-07 ASSESSMENT — ENCOUNTER SYMPTOMS
NAUSEA: 0
EYE REDNESS: 0
PHOTOPHOBIA: 0
ABDOMINAL PAIN: 0
APNEA: 0
EYE PAIN: 0
VOMITING: 0
CHEST TIGHTNESS: 0
DIARRHEA: 0
BLOOD IN STOOL: 0
WHEEZING: 0
SHORTNESS OF BREATH: 0
TROUBLE SWALLOWING: 0

## 2022-02-07 ASSESSMENT — PATIENT HEALTH QUESTIONNAIRE - PHQ9
SUM OF ALL RESPONSES TO PHQ QUESTIONS 1-9: 0
SUM OF ALL RESPONSES TO PHQ9 QUESTIONS 1 & 2: 0
2. FEELING DOWN, DEPRESSED OR HOPELESS: 0
1. LITTLE INTEREST OR PLEASURE IN DOING THINGS: 0

## 2022-02-07 NOTE — PROGRESS NOTES
[facility]  [id number] [facility] [address]  Dept: [phone]  Dept Fax: [phone]    Patient Care Team:  CESAR Wan CNP as PCP - General (Family Medicine)  CESAR Wan CNP as PCP - [address] [place] [address] Provider  Grace Loaiza MD as Consulting Physician (Gastroenterology)  Silvina Mehta MD as Consulting Physician (Hematology and Oncology)    2022     Nanci Robles Sr. (:  [phone])KP a 78 y.o. male, here for evaluation of the following medical concerns:   Chief Complaint   Patient presents with   Lealexan Bleak. is a 58-year-old male here today for routine follow-up. Girlfriend, De Mcelroy, is present for today's visit. They maintain he has not scheduled any specialist provider since his last visit. Patient states he forgets. Ty states he will not go to appointments unless she schedules and attends appointments with him, however this is becoming increasingly difficult as she is also the caregiver for her mother. They deny any medication changes. Rufus Sheriff is a current smoker, does not desire to quit    They deny any worsening memory loss or changes. He maintains he is forgetful with appointments, De Mcelroy states he occasionally forgets medication and then will not take it for fear of double dosing. This is not common. Coronary Artery Disease  Presents for follow-up visit. Pertinent negatives include no chest pain, chest tightness, dizziness, leg swelling, palpitations or shortness of breath. The symptoms have been stable. Hypertension  This is a chronic problem. The problem is unchanged. Associated symptoms include peripheral edema. Pertinent negatives include no blurred vision, chest pain, headaches, neck pain, palpitations or shortness of breath. Risk factors for coronary artery disease include smoking/tobacco exposure.      .    Review of Systems Constitutional: Negative for appetite change, chills, diaphoresis, fatigue, fever and unexpected weight change. HENT: Negative for hearing loss and trouble swallowing. Eyes: Negative for blurred vision, photophobia, pain, redness and visual disturbance. Respiratory: Negative for apnea, chest tightness, shortness of breath and wheezing. Snoring   Cardiovascular: Negative for chest pain, palpitations and leg swelling. Gastrointestinal: Negative for abdominal pain, blood in stool, diarrhea, nausea and vomiting. Endocrine: Negative for polydipsia and polyuria. Genitourinary: Negative for difficulty urinating, frequency, hematuria and penile discharge. Musculoskeletal: Negative for myalgias and neck pain. Skin: Negative for rash. Neurological: Negative for dizziness, seizures, syncope, numbness and headaches. Psychiatric/Behavioral: Positive for confusion. Negative for suicidal ideas. The patient is not nervous/anxious. Prior to Visit Medications    Medication Sig Taking?  Authorizing Provider   acetaminophen (AMINOFEN) 325 MG tablet Take 2 tablets by mouth every 6 hours as needed for Pain Yes CESAR Montesinos CNP   atorvastatin (LIPITOR) 40 MG tablet take 1 tablet by mouth at bedtime Yes CESAR Montesinos CNP   memantine (NAMENDA) 5 MG tablet take 1 tablet by mouth twice a day Yes CESAR Montesinos CNP   metoprolol tartrate (LOPRESSOR) 25 MG tablet Take 0.5 tablets by mouth 2 times daily Yes CESAR Montesinos CNP   aspirin (RA ASPIRIN ADULT LOW STRENGTH) 81 MG chewable tablet chew and swallow 1 tablet by mouth once daily Yes CESAR Montesinos CNP   Cyanocobalamin ER (RA VITAMIN B-12 TR) 1000 MCG TBCR take 1 tablet by mouth once daily Yes CESAR Montesinos CNP   furosemide (LASIX) 20 MG tablet Take 20 mg by mouth daily  Historical Provider, MD   omeprazole (PRILOSEC) 20 MG delayed release capsule Take 20 mg by mouth daily  Historical Provider, MD magnesium oxide (MAG-OX) 400 MG tablet Take 400 mg by mouth daily  Historical Provider, MD        Social History     Tobacco Use    Smoking status: Current Every Day Smoker     Packs/day: 1.00     Years: 30.00     Pack years: 30.00     Types: Cigarettes    Smokeless tobacco: Never Used   Substance Use Topics    Alcohol use: Not Currently        Vitals:    02/07/22 1601 02/07/22 1652   BP: (!) 150/85 (!) 141/76   Site: Left Upper Arm Left Upper Arm   Position: Sitting Sitting   Cuff Size:  Large Adult   Pulse: 80    Temp: 98.2 °F (36.8 °C)    TempSrc: Temporal    SpO2: 99%    Weight: 191 lb (86.6 kg)    Height: 5' 9\" (1.753 m)      Estimated body mass index is 28.21 kg/m² as calculated from the following:    Height as of this encounter: 5' 9\" (1.753 m). Weight as of this encounter: 191 lb (86.6 kg). DIAGNOSTIC FINDINGS:  CBC:  Lab Results   Component Value Date    WBC 7.3 07/12/2020    HGB 11.6 07/12/2020     07/12/2020     09/20/2011       BMP:    Lab Results   Component Value Date     07/22/2020    K 3.5 07/22/2020     07/22/2020    CO2 27 07/22/2020    BUN 10 07/22/2020    CREATININE 1.21 07/22/2020    GLUCOSE 91 07/22/2020    GLUCOSE 116 09/20/2011       HEMOGLOBIN A1C:   Lab Results   Component Value Date    LABA1C 6.2 06/07/2021       FASTING LIPID PANEL:  Lab Results   Component Value Date    CHOL 178 01/26/2020    HDL 30 (L) 01/26/2020    TRIG 82 01/26/2020       Physical Exam  Vitals reviewed. Constitutional:       Appearance: He is well-developed. He is not ill-appearing or toxic-appearing. HENT:      Head: Normocephalic and atraumatic. Mouth/Throat:      Mouth: Mucous membranes are moist.   Eyes:      General:         Right eye: No discharge. Left eye: No discharge. Pupils: Pupils are equal, round, and reactive to light. Cardiovascular:      Rate and Rhythm: Normal rate and regular rhythm. Heart sounds: No murmur heard.       Pulmonary: Effort: Pulmonary effort is normal.      Breath sounds: Normal breath sounds. Abdominal:      General: Bowel sounds are normal.      Palpations: Abdomen is soft. There is no mass. Tenderness: There is no guarding. Musculoskeletal:      Cervical back: Neck supple. Right lower leg: No edema. Left lower leg: No edema. Comments: RIANNA x4   Skin:     General: Skin is warm and dry. Neurological:      Mental Status: He is alert and oriented to person, place, and time. Cranial Nerves: No cranial nerve deficit. Motor: No tremor. Coordination: Coordination normal.   Psychiatric:         Behavior: Behavior normal.      Comments: Lack of insight into his health         ASSESSMENT     Diagnosis Orders   1. Essential hypertension     2. Chronic systolic CHF (congestive heart failure) (HCC)  atorvastatin (LIPITOR) 40 MG tablet   3. Prediabetes  atorvastatin (LIPITOR) 40 MG tablet   4. Arthritis of knee  acetaminophen (AMINOFEN) 325 MG tablet   5. Memory loss  memantine (NAMENDA) 5 MG tablet          PLAN:  Orders Placed This Encounter   Medications    acetaminophen (AMINOFEN) 325 MG tablet     Sig: Take 2 tablets by mouth every 6 hours as needed for Pain     Dispense:  120 tablet     Refill:  3    atorvastatin (LIPITOR) 40 MG tablet     Sig: take 1 tablet by mouth at bedtime     Dispense:  30 tablet     Refill:  2    memantine (NAMENDA) 5 MG tablet     Sig: take 1 tablet by mouth twice a day     Dispense:  60 tablet     Refill:  3         1. Medication refills provided today. 2. Overdue for routine labs monitor kidney function, discussed importance of checking labs patient does take medications that can affect liver as well as kidneys. Orders reprinted today. 3. Patient provided with fit test, colon cancer screening order placed last June 4. Blood pressure improved upon recheck today. Would prefer for kidney function results before making any changes to current regimen.   Blood pressure has been well controlled last 3 visits  5. Patient not following with cardiology as advised by PCP with history of CHF and CAD. Continue aspirin, statin and metoprolol at this time    FOLLOW UP AND INSTRUCTIONS:  Return in about 6 months (around 8/7/2022) for HTN. · Ricky received counseling on the following healthy behaviors:nutrition and medication adherence    · Discussed use, benefit, and side effects of prescribed medications. Barriers to  medication compliance addressed. All patient questions answered. Pt  verbalized understanding of all instructions given. · Patient given educational materials - see patient instructions      · Patient advised to contact scheduling offices for any referrals or imaging orders  placed today if they have not been contacted in 48 hours. Return in about 6 months (around 8/7/2022) for HTN. An electronic signature was used to authenticate this note. --Kandis Brunner, APRN - CNP on 2/7/2022 at 5:00 PM  Visit Information    Have you changed or started any medications since your last visit including any over-the-counter medicines, vitamins, or herbal medicines? no   Are you having any side effects from any of your medications? -  no  Have you stopped taking any of your medications? Is so, why? -  no    Have you seen any other physician or provider since your last visit? Yes - Records Obtained  Have you had any other diagnostic tests since your last visit? Yes - Records Obtained  Have you been seen in the emergency room and/or had an admission to a hospital since we last saw you? No  Have you had your routine dental cleaning in the past 6 months? no    Have you activated your [employer] account? If not, what are your barriers?  Yes     Patient Care Team:  Kandis Brunner, APRN - CNP as PCP - General (Family Medicine)  Kandis Brunner, APRN - CNP as PCP - [facility] Empaneled Provider  Cassy Hung MD as Consulting Physician (Gastroenterology)  Cassy Cruz MD [place] as Consulting Physician (Hematology and Oncology)    Medical History Review  Past Medical, Family, and Social History reviewed and does not contribute to the patient presenting condition    Health Maintenance   Topic Date Due    AAA screen  Never done    Hepatitis C screen  Never done    COVID-19 Vaccine (1) Never done    Shingles Vaccine (1 of 2) Never done    Low dose CT lung screening  Never done    Colon cancer screen colonoscopy  03/10/2019    Pneumococcal 65+ years Vaccine (1 of 1 - PPSV23) Never done   [employer] Visit (AWV)  Never done    Lipid screen  01/26/2021    Potassium monitoring  07/22/2021    Creatinine monitoring  07/22/2021    Flu vaccine (1) 02/07/2023 (Originally 9/1/2021)    A1C test (Diabetic or Prediabetic)  06/07/2022    Depression Screen  06/07/2022    DTaP/Tdap/Td vaccine (2 - Td or Tdap) 12/31/2028    Hepatitis A vaccine  Aged Out    Hepatitis B vaccine  Aged Out    Hib vaccine  Aged Out    Meningococcal (ACWY) vaccine  Aged Out

## 2022-02-08 ASSESSMENT — ENCOUNTER SYMPTOMS: BLURRED VISION: 0

## 2022-08-02 DIAGNOSIS — I50.22 CHRONIC SYSTOLIC CHF (CONGESTIVE HEART FAILURE) (HCC): ICD-10-CM

## 2023-02-26 NOTE — CARE COORDINATION
this time. He did go to snf after hospital stay years ago and believes it was [facility]. Patient would benefit from follow up appt prior to dc due to high readmission risk. Await GI input for GI bleed.          Electronically signed by Cosme Rosenbaum RN on 12/21/17 at 1:49 PM
no

## 2023-04-24 ENCOUNTER — OFFICE VISIT (OUTPATIENT)
Dept: PRIMARY CARE CLINIC | Age: 68
End: 2023-04-24

## 2023-04-24 ENCOUNTER — HOSPITAL ENCOUNTER (OUTPATIENT)
Age: 68
Discharge: HOME | End: 2023-04-24
Payer: MEDICARE

## 2023-04-24 VITALS
SYSTOLIC BLOOD PRESSURE: 146 MMHG | DIASTOLIC BLOOD PRESSURE: 87 MMHG | WEIGHT: 188.4 LBS | HEART RATE: 91 BPM | OXYGEN SATURATION: 95 % | BODY MASS INDEX: 27.91 KG/M2 | HEIGHT: 69 IN

## 2023-04-24 DIAGNOSIS — E53.8 VITAMIN B12 DEFICIENCY (NON ANEMIC): ICD-10-CM

## 2023-04-24 DIAGNOSIS — Z12.11 COLON CANCER SCREENING: ICD-10-CM

## 2023-04-24 DIAGNOSIS — R41.3 MEMORY LOSS: ICD-10-CM

## 2023-04-24 DIAGNOSIS — Z87.891 PERSONAL HISTORY OF TOBACCO USE, PRESENTING HAZARDS TO HEALTH: ICD-10-CM

## 2023-04-24 DIAGNOSIS — I50.22 CHRONIC SYSTOLIC CHF (CONGESTIVE HEART FAILURE) (HCC): ICD-10-CM

## 2023-04-24 DIAGNOSIS — Z13.6 ENCOUNTER FOR ABDOMINAL AORTIC ANEURYSM (AAA) SCREENING: ICD-10-CM

## 2023-04-24 DIAGNOSIS — R73.03 PREDIABETES: ICD-10-CM

## 2023-04-24 DIAGNOSIS — M17.10 ARTHRITIS OF KNEE: ICD-10-CM

## 2023-04-24 DIAGNOSIS — I10 ESSENTIAL HYPERTENSION: Primary | ICD-10-CM

## 2023-04-24 DIAGNOSIS — Z87.891 PERSONAL HISTORY OF TOBACCO USE: ICD-10-CM

## 2023-04-24 DIAGNOSIS — R76.0 ANTICARDIOLIPIN ANTIBODY POSITIVE: ICD-10-CM

## 2023-04-24 LAB
ALBUMIN SERPL-MCNC: 3.7 G/DL (ref 3.5–5.2)
ALBUMIN/GLOBULIN RATIO: 1.1 (ref 1–2.5)
ALP SERPL-CCNC: 96 U/L (ref 40–129)
ALT SERPL-CCNC: 15 U/L (ref 5–41)
ANION GAP SERPL CALCULATED.3IONS-SCNC: 10 MMOL/L (ref 9–17)
AST SERPL-CCNC: 16 U/L
BILIRUB SERPL-MCNC: 0.8 MG/DL (ref 0.3–1.2)
BUN SERPL-MCNC: 14 MG/DL (ref 8–23)
CALCIUM SERPL-MCNC: 9.2 MG/DL (ref 8.6–10.4)
CHLORIDE SERPL-SCNC: 104 MMOL/L (ref 98–107)
CHOLEST SERPL-MCNC: 189 MG/DL
CHOLESTEROL/HDL RATIO: 6.5
CO2 SERPL-SCNC: 26 MMOL/L (ref 20–31)
CREAT SERPL-MCNC: 1.38 MG/DL (ref 0.7–1.2)
GFR SERPL CREATININE-BSD FRML MDRD: 55 ML/MIN/1.73M2
GLUCOSE SERPL-MCNC: 85 MG/DL (ref 70–99)
HDLC SERPL-MCNC: 29 MG/DL
LDLC SERPL CALC-MCNC: 130 MG/DL (ref 0–130)
POTASSIUM SERPL-SCNC: 4 MMOL/L (ref 3.7–5.3)
PROT SERPL-MCNC: 7.2 G/DL (ref 6.4–8.3)
SODIUM SERPL-SCNC: 140 MMOL/L (ref 135–144)
TRIGL SERPL-MCNC: 148 MG/DL

## 2023-04-24 PROCEDURE — 83036 HEMOGLOBIN GLYCOSYLATED A1C: CPT

## 2023-04-24 PROCEDURE — 80053 COMPREHEN METABOLIC PANEL: CPT

## 2023-04-24 PROCEDURE — 80061 LIPID PANEL: CPT

## 2023-04-24 PROCEDURE — 36415 COLL VENOUS BLD VENIPUNCTURE: CPT

## 2023-04-24 RX ORDER — AMMONIUM LACTATE 12 G/100G
CREAM TOPICAL
Qty: 385 G | Refills: 4 | Status: SHIPPED | OUTPATIENT
Start: 2023-04-24 | End: 2023-05-24

## 2023-04-24 RX ORDER — MEMANTINE HYDROCHLORIDE 5 MG/1
TABLET ORAL
Qty: 60 TABLET | Refills: 3 | Status: SHIPPED | OUTPATIENT
Start: 2023-04-24

## 2023-04-24 RX ORDER — ACETAMINOPHEN 325 MG/1
650 TABLET ORAL EVERY 6 HOURS PRN
Qty: 120 TABLET | Refills: 3 | Status: SHIPPED | OUTPATIENT
Start: 2023-04-24

## 2023-04-24 RX ORDER — PSYLLIUM HUSK 3.4 G/7G
POWDER ORAL
Qty: 30 TABLET | Refills: 3 | Status: SHIPPED | OUTPATIENT
Start: 2023-04-24

## 2023-04-24 RX ORDER — ATORVASTATIN CALCIUM 40 MG/1
TABLET, FILM COATED ORAL
Qty: 30 TABLET | Refills: 2 | Status: SHIPPED | OUTPATIENT
Start: 2023-04-24 | End: 2023-04-25 | Stop reason: SDUPTHER

## 2023-04-24 RX ORDER — FUROSEMIDE 20 MG/1
20 TABLET ORAL DAILY
Qty: 30 TABLET | Refills: 5 | Status: SHIPPED | OUTPATIENT
Start: 2023-04-24 | End: 2023-05-24

## 2023-04-24 RX ORDER — ASPIRIN 81 MG/1
TABLET, CHEWABLE ORAL
Qty: 30 TABLET | Refills: 3 | Status: SHIPPED | OUTPATIENT
Start: 2023-04-24

## 2023-04-24 SDOH — ECONOMIC STABILITY: INCOME INSECURITY: HOW HARD IS IT FOR YOU TO PAY FOR THE VERY BASICS LIKE FOOD, HOUSING, MEDICAL CARE, AND HEATING?: NOT VERY HARD

## 2023-04-24 SDOH — ECONOMIC STABILITY: HOUSING INSECURITY
IN THE LAST 12 MONTHS, WAS THERE A TIME WHEN YOU DID NOT HAVE A STEADY PLACE TO SLEEP OR SLEPT IN A SHELTER (INCLUDING NOW)?: NO

## 2023-04-24 SDOH — ECONOMIC STABILITY: FOOD INSECURITY: WITHIN THE PAST 12 MONTHS, YOU WORRIED THAT YOUR FOOD WOULD RUN OUT BEFORE YOU GOT MONEY TO BUY MORE.: NEVER TRUE

## 2023-04-24 SDOH — ECONOMIC STABILITY: FOOD INSECURITY: WITHIN THE PAST 12 MONTHS, THE FOOD YOU BOUGHT JUST DIDN'T LAST AND YOU DIDN'T HAVE MONEY TO GET MORE.: NEVER TRUE

## 2023-04-24 ASSESSMENT — ENCOUNTER SYMPTOMS
BLURRED VISION: 0
PHOTOPHOBIA: 0
EYE REDNESS: 0
VOMITING: 0
NAUSEA: 0
BLOOD IN STOOL: 0
ABDOMINAL PAIN: 0
CHEST TIGHTNESS: 0
EYE PAIN: 0
APNEA: 0
SHORTNESS OF BREATH: 0
DIARRHEA: 0
WHEEZING: 0
TROUBLE SWALLOWING: 0

## 2023-04-24 ASSESSMENT — PATIENT HEALTH QUESTIONNAIRE - PHQ9
SUM OF ALL RESPONSES TO PHQ9 QUESTIONS 1 & 2: 0
1. LITTLE INTEREST OR PLEASURE IN DOING THINGS: 0
2. FEELING DOWN, DEPRESSED OR HOPELESS: 0
SUM OF ALL RESPONSES TO PHQ QUESTIONS 1-9: 0

## 2023-04-24 NOTE — PROGRESS NOTES
discharge. Musculoskeletal:  Negative for myalgias and neck pain. Skin:  Negative for rash. Neurological:  Negative for dizziness, seizures, syncope, numbness and headaches. Psychiatric/Behavioral:  Positive for confusion. Negative for suicidal ideas. The patient is not nervous/anxious. Objective     DIAGNOSTIC FINDINGS:  CBC:  Lab Results   Component Value Date/Time    WBC 7.3 2020 06:51 AM    HGB 11.6 2020 06:51 AM     2020 06:51 AM     2011 02:09 AM       BMP:    Lab Results   Component Value Date/Time     2020 01:58 PM    K 3.5 2020 01:58 PM     2020 01:58 PM    CO2 27 2020 01:58 PM    BUN 10 2020 01:58 PM    CREATININE 1.21 2020 01:58 PM    GLUCOSE 91 2020 01:58 PM    GLUCOSE 116 2011 02:09 AM       HEMOGLOBIN A1C:   Lab Results   Component Value Date/Time    LABA1C 6.2 2021 03:42 PM       FASTING LIPID PANEL:  Lab Results   Component Value Date    CHOL 178 2020    HDL 30 (L) 2020    TRIG 82 2020       Physical Exam  Vitals reviewed. Constitutional:       Appearance: He is well-developed. He is not ill-appearing or toxic-appearing. HENT:      Head: Normocephalic and atraumatic. Mouth/Throat:      Mouth: Mucous membranes are moist.   Eyes:      General:         Right eye: No discharge. Left eye: No discharge. Pupils: Pupils are equal, round, and reactive to light. Cardiovascular:      Rate and Rhythm: Normal rate and regular rhythm. Heart sounds: No murmur heard. Pulmonary:      Effort: Pulmonary effort is normal.      Breath sounds: Normal breath sounds. Abdominal:      General: Bowel sounds are normal.      Palpations: Abdomen is soft. There is no mass. Tenderness: There is no guarding. Musculoskeletal:      Cervical back: Neck supple. Right lower le+ Pitting Edema present. Left lower le+ Pitting Edema present.

## 2023-04-25 DIAGNOSIS — I50.22 CHRONIC SYSTOLIC CHF (CONGESTIVE HEART FAILURE) (HCC): ICD-10-CM

## 2023-04-25 DIAGNOSIS — R73.03 PREDIABETES: ICD-10-CM

## 2023-04-25 RX ORDER — ATORVASTATIN CALCIUM 40 MG/1
TABLET, FILM COATED ORAL
Qty: 90 TABLET | Refills: 0 | Status: SHIPPED | OUTPATIENT
Start: 2023-04-25

## 2023-04-26 LAB
EST. AVERAGE GLUCOSE BLD GHB EST-MCNC: 123 MG/DL
HBA1C MFR BLD: 5.9 % (ref 4–6)

## 2023-06-07 NOTE — PROGRESS NOTES
BRONCHOSPASM/BRONCHOCONSTRICTION     [x]         IMPROVE AERATION/BREATH SOUNDS  [x]   ADMINISTER BRONCHODILATOR THERAPY AS APPROPRIATE  [x]   ASSESS BREATH SOUNDS  []   IMPLEMENT AEROSOL/MDI PROTOCOL  [x]   PATIENT EDUCATION AS NEEDED 04-Jun-2023 20:53

## 2023-07-10 ENCOUNTER — TELEPHONE (OUTPATIENT)
Dept: PHARMACY | Facility: CLINIC | Age: 68
End: 2023-07-10

## 2023-07-10 NOTE — TELEPHONE ENCOUNTER
POPULATION HEALTH CLINICAL PHARMACY REVIEW: ADHERENCE  Identified care gap per [employer]: fills at [facility] Aid: Statin adherence    ASSESSMENT  [address] Records claims through 06/26/2023 (Prior Year [address] = not reported; YTD [address] = FIRST FILL; Potential Fail Date: 07/13/2023):   atorvastatin 40mg daily last filled on 04/24/2023 for 30 day supply. Next refill due: 05/24/2023    Last Rx sent on 04/25/2023 for 90ds with 0 refills    Lab Results   Component Value Date    CHOL 189 04/24/2023    TRIG 148 04/24/2023    HDL 29 (L) 04/24/2023    LDLCHOLESTEROL 130 04/24/2023     ALT   Date Value Ref Range Status   04/24/2023 15 5 - 41 U/L Final     AST   Date Value Ref Range Status   04/24/2023 16 <40 U/L Final     The 10-year ASCVD risk score (Renetta BRAGG, et al., 2019) is: 40.9%    Values used to calculate the score:      Age: 70 years      Sex: Male      Is Non- : Yes      Diabetic: No      Tobacco smoker: Yes      Systolic Blood Pressure: 171 mmHg      Is BP treated: Yes      HDL Cholesterol: 29 mg/dL      Total Cholesterol: 189 mg/dL      PLAN  The following are interventions that have been identified:   Patient overdue refilling atorvastatin and active on home medication list.     Per [employer] MyScript via [employer] portal, copay should be $15 for 90ds at [facility] and $5 at [facility]. Attempting to reach patient to review. Left message asking for return call. Outreach to [facility] - requested they refill patient's atorvastatin. Will send letter to patient regarding med pickup.      Last Visit: 04/24/2023  Next Visit: 08/28/2023    Renetta Dunaway, PharmD, [facility], [address] [place] [address], toll free: [phone], option 1    =======================================================    For Pharmacy Admin Tracking Only  Program: Jeana in place:  No  Recommendation Provided To: Pharmacy: 1  Intervention Detail: Adherence

## 2023-08-02 ENCOUNTER — TELEPHONE (OUTPATIENT)
Dept: PRIMARY CARE CLINIC | Age: 68
End: 2023-08-02

## 2023-08-28 ENCOUNTER — TELEPHONE (OUTPATIENT)
Dept: PRIMARY CARE CLINIC | Age: 68
End: 2023-08-28

## 2023-12-16 DIAGNOSIS — R41.3 MEMORY LOSS: ICD-10-CM

## 2023-12-16 RX ORDER — MEMANTINE HYDROCHLORIDE 5 MG/1
TABLET ORAL
Qty: 60 TABLET | Refills: 3 | OUTPATIENT
Start: 2023-12-16

## 2024-03-16 DIAGNOSIS — R41.3 MEMORY LOSS: ICD-10-CM

## 2024-03-18 RX ORDER — MEMANTINE HYDROCHLORIDE 5 MG/1
TABLET ORAL
Qty: 60 TABLET | Refills: 3 | Status: SHIPPED | OUTPATIENT
Start: 2024-03-18

## 2024-04-23 ENCOUNTER — OFFICE VISIT (OUTPATIENT)
Dept: PRIMARY CARE CLINIC | Age: 69
End: 2024-04-23
Payer: MEDICARE

## 2024-04-23 VITALS
SYSTOLIC BLOOD PRESSURE: 154 MMHG | HEIGHT: 69 IN | WEIGHT: 190 LBS | OXYGEN SATURATION: 96 % | DIASTOLIC BLOOD PRESSURE: 84 MMHG | BODY MASS INDEX: 28.14 KG/M2 | HEART RATE: 88 BPM

## 2024-04-23 DIAGNOSIS — Z87.891 PERSONAL HISTORY OF TOBACCO USE: ICD-10-CM

## 2024-04-23 DIAGNOSIS — I50.22 CHRONIC SYSTOLIC CHF (CONGESTIVE HEART FAILURE) (HCC): ICD-10-CM

## 2024-04-23 DIAGNOSIS — Z23 NEED FOR VACCINATION: ICD-10-CM

## 2024-04-23 DIAGNOSIS — R76.0 ANTICARDIOLIPIN ANTIBODY POSITIVE: ICD-10-CM

## 2024-04-23 DIAGNOSIS — Z00.00 INITIAL MEDICARE ANNUAL WELLNESS VISIT: Primary | ICD-10-CM

## 2024-04-23 DIAGNOSIS — Z87.891 PERSONAL HISTORY OF TOBACCO USE, PRESENTING HAZARDS TO HEALTH: ICD-10-CM

## 2024-04-23 DIAGNOSIS — Z13.6 ENCOUNTER FOR SCREENING FOR ABDOMINAL AORTIC ANEURYSM (AAA) IN PATIENT 50 YEARS OF AGE OR OLDER WITHOUT OTHER RISK FACTORS FOR AAA: ICD-10-CM

## 2024-04-23 DIAGNOSIS — I10 ESSENTIAL HYPERTENSION: ICD-10-CM

## 2024-04-23 DIAGNOSIS — R41.3 MEMORY LOSS: ICD-10-CM

## 2024-04-23 DIAGNOSIS — Z71.89 ACP (ADVANCE CARE PLANNING): ICD-10-CM

## 2024-04-23 DIAGNOSIS — R73.03 PREDIABETES: ICD-10-CM

## 2024-04-23 DIAGNOSIS — E53.8 VITAMIN B12 DEFICIENCY (NON ANEMIC): ICD-10-CM

## 2024-04-23 DIAGNOSIS — M17.10 ARTHRITIS OF KNEE: ICD-10-CM

## 2024-04-23 PROBLEM — J44.9 CHRONIC OBSTRUCTIVE PULMONARY DISEASE (HCC): Status: ACTIVE | Noted: 2024-04-23

## 2024-04-23 PROCEDURE — 3079F DIAST BP 80-89 MM HG: CPT | Performed by: NURSE PRACTITIONER

## 2024-04-23 PROCEDURE — 99214 OFFICE O/P EST MOD 30 MIN: CPT | Performed by: NURSE PRACTITIONER

## 2024-04-23 PROCEDURE — 1123F ACP DISCUSS/DSCN MKR DOCD: CPT | Performed by: NURSE PRACTITIONER

## 2024-04-23 PROCEDURE — 99497 ADVNCD CARE PLAN 30 MIN: CPT | Performed by: NURSE PRACTITIONER

## 2024-04-23 PROCEDURE — 3077F SYST BP >= 140 MM HG: CPT | Performed by: NURSE PRACTITIONER

## 2024-04-23 PROCEDURE — G0296 VISIT TO DETERM LDCT ELIG: HCPCS | Performed by: NURSE PRACTITIONER

## 2024-04-23 PROCEDURE — G0009 ADMIN PNEUMOCOCCAL VACCINE: HCPCS | Performed by: NURSE PRACTITIONER

## 2024-04-23 PROCEDURE — 99406 BEHAV CHNG SMOKING 3-10 MIN: CPT | Performed by: NURSE PRACTITIONER

## 2024-04-23 PROCEDURE — G0438 PPPS, INITIAL VISIT: HCPCS | Performed by: NURSE PRACTITIONER

## 2024-04-23 PROCEDURE — 90677 PCV20 VACCINE IM: CPT | Performed by: NURSE PRACTITIONER

## 2024-04-23 RX ORDER — ACETAMINOPHEN 325 MG/1
650 TABLET ORAL EVERY 6 HOURS PRN
Qty: 120 TABLET | Refills: 3 | Status: SHIPPED | OUTPATIENT
Start: 2024-04-23

## 2024-04-23 RX ORDER — FUROSEMIDE 20 MG/1
20 TABLET ORAL DAILY
Qty: 30 TABLET | Refills: 3 | Status: SHIPPED | OUTPATIENT
Start: 2024-04-23 | End: 2024-08-21

## 2024-04-23 RX ORDER — ATORVASTATIN CALCIUM 40 MG/1
TABLET, FILM COATED ORAL
Qty: 90 TABLET | Refills: 0 | Status: SHIPPED | OUTPATIENT
Start: 2024-04-23

## 2024-04-23 RX ORDER — ASPIRIN 81 MG/1
TABLET, CHEWABLE ORAL
Qty: 30 TABLET | Refills: 3 | Status: SHIPPED | OUTPATIENT
Start: 2024-04-23

## 2024-04-23 RX ORDER — PSYLLIUM HUSK 3.4 G/7G
POWDER ORAL
Qty: 30 TABLET | Refills: 3 | Status: SHIPPED | OUTPATIENT
Start: 2024-04-23

## 2024-04-23 RX ORDER — MEMANTINE HYDROCHLORIDE 5 MG/1
5 TABLET ORAL 2 TIMES DAILY
Qty: 60 TABLET | Refills: 3 | Status: SHIPPED | OUTPATIENT
Start: 2024-04-23

## 2024-04-23 ASSESSMENT — LIFESTYLE VARIABLES
HOW OFTEN DO YOU HAVE A DRINK CONTAINING ALCOHOL: NEVER
HOW MANY STANDARD DRINKS CONTAINING ALCOHOL DO YOU HAVE ON A TYPICAL DAY: PATIENT DOES NOT DRINK

## 2024-04-23 ASSESSMENT — PATIENT HEALTH QUESTIONNAIRE - PHQ9
SUM OF ALL RESPONSES TO PHQ QUESTIONS 1-9: 0
1. LITTLE INTEREST OR PLEASURE IN DOING THINGS: NOT AT ALL
2. FEELING DOWN, DEPRESSED OR HOPELESS: NOT AT ALL
SUM OF ALL RESPONSES TO PHQ9 QUESTIONS 1 & 2: 0
SUM OF ALL RESPONSES TO PHQ QUESTIONS 1-9: 0

## 2024-04-23 NOTE — PROGRESS NOTES
care preferences in state-specific advance directives. Also reviewed the process of designating a trusted capable adult as an Agent (or Health Care Power of ) to make health care decisions for the patient if the patient becomes unable due to incapacity. Patient was asked to complete advance directive forms, if not already done, and to provide a dated, signed and witnessed (or notarized) copy, per the forms' requirements, to the practice office.    Time spent (minutes): 16 minutes       Tobacco Use:  Tobacco Use: High Risk (4/24/2023)    Patient History     Smoking Tobacco Use: Every Day     Smokeless Tobacco Use: Never     Passive Exposure: Not on file     E-cigarette/Vaping       Questions Responses    E-cigarette/Vaping Use     Start Date     Passive Exposure     Quit Date     Counseling Given     Comments           Interventions:  Patient declined any further intervention or treatment    Tobacco Use Counseling: Patient was counseled on tobacco cessation. Based upon patient's motivation to change his behavior, the following plan was agreed upon: Patient is not ready to work toward tobacco cessation at this time. Educational materials regarding tobacco cessation were provided. Provider spent 5 minutes counseling patient.                   Objective   Vitals:    04/23/24 1403 04/23/24 1442   BP: (!) 162/84 (!) 154/84   Site: Left Upper Arm    Position: Sitting    Cuff Size: Medium Adult    Pulse: 88    SpO2: 96%    Weight: 86.2 kg (190 lb)    Height: 1.753 m (5' 9\")       Body mass index is 28.06 kg/m².               No Known Allergies  Prior to Visit Medications    Medication Sig Taking? Authorizing Provider   atorvastatin (LIPITOR) 40 MG tablet take 1 tablet by mouth at bedtime Yes Antoinette Velasquez APRN - CNP   Cyanocobalamin ER (RA VITAMIN B-12 TR) 1000 MCG TBCR take 1 tablet by mouth once daily Yes Antoinette Velasquez APRN - CNP   furosemide (LASIX) 20 MG tablet Take 1 tablet by mouth daily Yes Ron

## 2024-04-23 NOTE — PATIENT INSTRUCTIONS
Recommendations:    A preventive eye exam performed by an eye specialist is recommended every 1-2 years to screen for glaucoma; cataracts, macular degeneration, and other eye disorders.  A preventive dental visit is recommended every 6 months.  Try to get at least 150 minutes of exercise per week or 10,000 steps per day on a pedometer .  Order or download the FREE \"Exercise & Physical Activity: Your Everyday Guide\" from [employer]. Call 1-391.779.2808 or search [employer] online.  You need 8363-5772 mg of calcium and 2621-7860 IU of vitamin D per day. It is possible to meet your calcium requirement with diet alone, but a vitamin D supplement is usually necessary to meet this goal.  When exposed to the sun, use a sunscreen that protects against both UVA and UVB radiation with an SPF of 30 or greater. Reapply every 2 to 3 hours or after sweating, drying off with a towel, or swimming.  Always wear a seat belt when traveling in a car. Always wear a helmet when riding a bicycle or motorcycle.

## 2024-07-09 ENCOUNTER — APPOINTMENT (OUTPATIENT)
Dept: GENERAL RADIOLOGY | Age: 69
End: 2024-07-09
Payer: MEDICARE

## 2024-07-09 ENCOUNTER — HOSPITAL ENCOUNTER (EMERGENCY)
Age: 69
Discharge: HOME | End: 2024-07-09
Attending: EMERGENCY MEDICINE
Payer: MEDICARE

## 2024-07-09 VITALS
HEART RATE: 69 BPM | SYSTOLIC BLOOD PRESSURE: 147 MMHG | DIASTOLIC BLOOD PRESSURE: 93 MMHG | RESPIRATION RATE: 16 BRPM | OXYGEN SATURATION: 97 % | TEMPERATURE: 98.2 F

## 2024-07-09 DIAGNOSIS — M25.511 RIGHT SHOULDER PAIN, UNSPECIFIED CHRONICITY: Primary | ICD-10-CM

## 2024-07-09 DIAGNOSIS — M19.011 OSTEOARTHRITIS OF RIGHT SHOULDER, UNSPECIFIED OSTEOARTHRITIS TYPE: ICD-10-CM

## 2024-07-09 PROCEDURE — 6370000000 HC RX 637 (ALT 250 FOR IP)

## 2024-07-09 PROCEDURE — 73030 X-RAY EXAM OF SHOULDER: CPT

## 2024-07-09 PROCEDURE — 99283 EMERGENCY DEPT VISIT LOW MDM: CPT | Performed by: EMERGENCY MEDICINE

## 2024-07-09 RX ORDER — ACETAMINOPHEN 500 MG
1000 TABLET ORAL ONCE
Status: COMPLETED | OUTPATIENT
Start: 2024-07-09 | End: 2024-07-09

## 2024-07-09 RX ORDER — LIDOCAINE 4 G/G
1 PATCH TOPICAL DAILY
Status: DISCONTINUED | OUTPATIENT
Start: 2024-07-09 | End: 2024-07-09 | Stop reason: HOSPADM

## 2024-07-09 RX ORDER — LIDOCAINE 50 MG/G
1 PATCH TOPICAL DAILY
Qty: 10 PATCH | Refills: 0 | Status: SHIPPED | OUTPATIENT
Start: 2024-07-09 | End: 2024-07-19

## 2024-07-09 RX ORDER — CYCLOBENZAPRINE HCL 10 MG
10 TABLET ORAL ONCE
Status: COMPLETED | OUTPATIENT
Start: 2024-07-09 | End: 2024-07-09

## 2024-07-09 RX ORDER — IBUPROFEN 400 MG/1
400 TABLET ORAL ONCE
Status: COMPLETED | OUTPATIENT
Start: 2024-07-09 | End: 2024-07-09

## 2024-07-09 RX ADMIN — ACETAMINOPHEN 1000 MG: 500 TABLET ORAL at 13:01

## 2024-07-09 RX ADMIN — IBUPROFEN 400 MG: 400 TABLET, FILM COATED ORAL at 13:02

## 2024-07-09 RX ADMIN — CYCLOBENZAPRINE HYDROCHLORIDE 10 MG: 10 TABLET, FILM COATED ORAL at 14:31

## 2024-07-09 ASSESSMENT — PAIN - FUNCTIONAL ASSESSMENT: PAIN_FUNCTIONAL_ASSESSMENT: 0-10

## 2024-07-09 ASSESSMENT — PAIN SCALES - GENERAL
PAINLEVEL_OUTOF10: 8
PAINLEVEL_OUTOF10: 9
PAINLEVEL_OUTOF10: 9

## 2024-07-09 ASSESSMENT — LIFESTYLE VARIABLES
HOW MANY STANDARD DRINKS CONTAINING ALCOHOL DO YOU HAVE ON A TYPICAL DAY: PATIENT DOES NOT DRINK
HOW OFTEN DO YOU HAVE A DRINK CONTAINING ALCOHOL: NEVER

## 2024-07-09 ASSESSMENT — PAIN DESCRIPTION - LOCATION: LOCATION: ARM

## 2024-07-09 ASSESSMENT — PAIN DESCRIPTION - DESCRIPTORS: DESCRIPTORS: ACHING

## 2024-07-09 NOTE — DISCHARGE INSTRUCTIONS
-You were seen and evaluated by emergency medicine physicians at [facility].    -Please follow-up with your primary care physician and/or with the referrals to specialist.    -You were diagnosed with: Right shoulder pain, osteoarthritis of right shoulder    -Imaging showed no acute abnormality.  You had full range of motion and full strength of the right upper extremity.  You have chronic degenerative changes of the right shoulder.  Please take Tylenol and ibuprofen as needed for pain control.  -Please utilize the shoulder exercises to help with your arthritis of the right shoulder  -A prescription of lidocaine patches has been sent to your pharmacy.  Please take as prescribed and as needed for comfort  -A referral to orthopedic surgery has been included.  If the right shoulder pain worsens, please call their clinic office to schedule an appointment    -Please return to the Emergency Department if you are experiencing the following symptoms acutely: Worsening right shoulder, Headache, fever, chills, nausea, vomiting, chest pain, shortness of breath, abdominal pain, change with urination, change with bowel movements, change in your skin/hair/nail, weakness, fatigue, altered mental status and/or any change from baseline health.    -Thank you for coming to [facility].\

## 2024-07-09 NOTE — ED PROVIDER NOTES
[facility]     Emergency Department     Faculty Attestation    I performed a history and physical examination of the patient and discussed management with the resident. I reviewed the resident’s note and agree with the documented findings including all diagnostic interpretations and plan of care. Any areas of disagreement are noted on the chart. I was personally present for the key portions of any procedures. I have documented in the chart those procedures where I was not present during the key portions. I have reviewed the emergency nurses triage note. I agree with the chief complaint, past medical history, past surgical history, allergies, medications, social and family history as documented unless otherwise noted below. Documentation of the HPI, Physical Exam and Medical Decision Making performed by brenda is based on my personal performance of the HPI, PE and MDM. For Physician Assistant/ Nurse Practitioner cases/documentation I have personally evaluated this patient and have completed at least one if not all key elements of the E/M (history, physical exam, and MDM). Additional findings are as noted.    Primary Care Physician: Antoinette Velasquez, APRN - CNP    Note Started: 2:33 PM EDT     VITAL SIGNS:   oral temperature is 98.2 °F (36.8 °C). His blood pressure is 147/93 (abnormal) and his pulse is 69. His respiration is 16 and oxygen saturation is 97%.      Medical Decision Making  Posterior right shoulder pain.  Woke up with this pain no numbness no weakness but has significant restriction in range of motion particularly external rotation and abduction beyond 45 degrees.  No chest pain.  Does have some tenderness to palpation over the posterior aspect of the deltoid.  Impression tendinitis versus adhesive capsulitis.  Plan is x-ray, symptomatic treatment, reassess    Amount and/or Complexity of Data Reviewed  Radiology: ordered.    Risk  OTC

## 2024-07-09 NOTE — ED PROVIDER NOTES
[facility] ED  Emergency Department Encounter  Emergency Medicine Resident     Pt Name:Ricky Mobley Sr.  MRN: [id number]  Birthdate 1955  Date of evaluation: 7/9/24  PCP:  Antoinette Velasquez APRN - CNP  Note Started: 12:39 PM EDT      CHIEF COMPLAINT       Chief Complaint   Patient presents with    Arm Pain     right       HISTORY OF PRESENT ILLNESS  (Location/Symptom, Timing/Onset, Context/Setting, Quality, Duration, Modifying Factors, Severity.)      Ricky Mobley Sr. is a 69-year-old male that presents the ED complaining of right shoulder pain.  Patient states he woke up with this pain and is endorsing mild numbness and tingling extending down into his fingers.  Patient has no history of diabetes or neuropathy.  Patient states that the pain is predominately on the back part of his shoulder.  Denies any alcohol use or sleeping heavily on his 1 side.  Patient is a side sleeper per daughter at bedside.    Denies any chest pain or shortness of breath    PAST MEDICAL / SURGICAL / SOCIAL / FAMILY HISTORY      has a past medical history of Adenomatous colon polyp, Arthritis, CVA (cerebral vascular accident) (HCC), Diverticulosis of colon, History of colon polyps, Hyperlipidemia, Hypertension, Internal carotid artery stenosis, right: 50 to 69%, MI (myocardial infarction) (HCC), Poor historian, and Transient ischemic attack (TIA).       has a past surgical history that includes Colonoscopy (07/25/15); Cardiac surgery (2010); Colonoscopy (12/15/15); hemicolectomy (Right, 12/15/15); Colon surgery (Right, 12/15/15); Upper gastrointestinal endoscopy (1/14/16); Upper gastrointestinal endoscopy (11/13/15); Upper gastrointestinal endoscopy (3/8/16); Colonoscopy (03-10-16); Endoscopy, colon, diagnostic (7/24/2015); Endoscopy, colon, diagnostic (01/13/2016); Upper gastrointestinal endoscopy (12/22/2017); and Upper gastrointestinal endoscopy (N/A, 12/22/2017).      Social History     Socioeconomic

## 2024-07-18 DIAGNOSIS — R73.03 PREDIABETES: ICD-10-CM

## 2024-07-18 DIAGNOSIS — I50.22 CHRONIC SYSTOLIC CHF (CONGESTIVE HEART FAILURE) (HCC): ICD-10-CM

## 2024-07-18 RX ORDER — ATORVASTATIN CALCIUM 40 MG/1
TABLET, FILM COATED ORAL
Qty: 90 TABLET | Refills: 0 | Status: SHIPPED | OUTPATIENT
Start: 2024-07-18

## 2024-12-02 ENCOUNTER — TELEPHONE (OUTPATIENT)
Dept: PHARMACY | Facility: CLINIC | Age: 69
End: 2024-12-02

## 2025-01-14 DIAGNOSIS — E53.8 VITAMIN B12 DEFICIENCY (NON ANEMIC): ICD-10-CM

## 2025-01-14 DIAGNOSIS — R76.0 ANTICARDIOLIPIN ANTIBODY POSITIVE: ICD-10-CM

## 2025-01-14 DIAGNOSIS — I10 ESSENTIAL HYPERTENSION: ICD-10-CM

## 2025-01-14 DIAGNOSIS — R73.03 PREDIABETES: ICD-10-CM

## 2025-01-14 DIAGNOSIS — M17.10 ARTHRITIS OF KNEE: ICD-10-CM

## 2025-01-14 DIAGNOSIS — I50.22 CHRONIC SYSTOLIC CHF (CONGESTIVE HEART FAILURE) (HCC): ICD-10-CM

## 2025-01-14 DIAGNOSIS — R41.3 MEMORY LOSS: ICD-10-CM

## 2025-01-15 RX ORDER — MEMANTINE HYDROCHLORIDE 5 MG/1
5 TABLET ORAL 2 TIMES DAILY
Qty: 180 TABLET | Refills: 1 | OUTPATIENT
Start: 2025-01-15 | End: 2026-01-15

## 2025-01-15 RX ORDER — FUROSEMIDE 20 MG/1
20 TABLET ORAL DAILY
Qty: 90 TABLET | Refills: 1 | OUTPATIENT
Start: 2025-01-15 | End: 2026-01-15

## 2025-01-15 RX ORDER — PSYLLIUM HUSK 3.4 G/7G
POWDER ORAL
Qty: 30 TABLET | Refills: 3 | OUTPATIENT
Start: 2025-01-15

## 2025-01-15 RX ORDER — ATORVASTATIN CALCIUM 40 MG/1
40 TABLET, FILM COATED ORAL NIGHTLY
Qty: 90 TABLET | Refills: 1 | OUTPATIENT
Start: 2025-01-15 | End: 2026-01-15

## 2025-01-15 RX ORDER — ASPIRIN 81 MG/1
TABLET, CHEWABLE ORAL
Qty: 30 TABLET | Refills: 3 | OUTPATIENT
Start: 2025-01-15 | End: 2026-01-15

## 2025-01-15 RX ORDER — METOPROLOL TARTRATE 25 MG/1
12.5 TABLET, FILM COATED ORAL 2 TIMES DAILY
Qty: 180 TABLET | Refills: 1 | OUTPATIENT
Start: 2025-01-15 | End: 2026-01-15

## 2025-01-15 RX ORDER — ACETAMINOPHEN 325 MG/1
650 TABLET ORAL EVERY 6 HOURS PRN
Qty: 120 TABLET | Refills: 3 | OUTPATIENT
Start: 2025-01-15

## 2025-01-17 ENCOUNTER — OFFICE VISIT (OUTPATIENT)
Dept: PRIMARY CARE CLINIC | Age: 70
End: 2025-01-17

## 2025-01-17 ENCOUNTER — TELEPHONE (OUTPATIENT)
Dept: PRIMARY CARE CLINIC | Age: 70
End: 2025-01-17

## 2025-01-17 VITALS
BODY MASS INDEX: 12.4 KG/M2 | SYSTOLIC BLOOD PRESSURE: 150 MMHG | HEART RATE: 76 BPM | WEIGHT: 84 LBS | DIASTOLIC BLOOD PRESSURE: 85 MMHG | TEMPERATURE: 98.2 F

## 2025-01-17 DIAGNOSIS — R76.0 ANTICARDIOLIPIN ANTIBODY POSITIVE: ICD-10-CM

## 2025-01-17 DIAGNOSIS — Z13.6 ENCOUNTER FOR ABDOMINAL AORTIC ANEURYSM (AAA) SCREENING: ICD-10-CM

## 2025-01-17 DIAGNOSIS — J44.9 CHRONIC OBSTRUCTIVE PULMONARY DISEASE, UNSPECIFIED COPD TYPE (HCC): ICD-10-CM

## 2025-01-17 DIAGNOSIS — R73.03 PREDIABETES: Primary | ICD-10-CM

## 2025-01-17 DIAGNOSIS — E53.8 VITAMIN B12 DEFICIENCY (NON ANEMIC): ICD-10-CM

## 2025-01-17 DIAGNOSIS — I50.22 CHRONIC SYSTOLIC CHF (CONGESTIVE HEART FAILURE) (HCC): ICD-10-CM

## 2025-01-17 DIAGNOSIS — Z87.891 PERSONAL HISTORY OF TOBACCO USE: ICD-10-CM

## 2025-01-17 DIAGNOSIS — Z23 NEED FOR VACCINATION: ICD-10-CM

## 2025-01-17 DIAGNOSIS — I10 ESSENTIAL HYPERTENSION: ICD-10-CM

## 2025-01-17 DIAGNOSIS — R41.3 MEMORY LOSS: ICD-10-CM

## 2025-01-17 RX ORDER — PSYLLIUM HUSK 3.4 G/7G
POWDER ORAL
Qty: 30 TABLET | Refills: 3 | Status: SHIPPED | OUTPATIENT
Start: 2025-01-17

## 2025-01-17 RX ORDER — ATORVASTATIN CALCIUM 40 MG/1
40 TABLET, FILM COATED ORAL NIGHTLY
Qty: 90 TABLET | Refills: 1 | Status: SHIPPED | OUTPATIENT
Start: 2025-01-17 | End: 2026-01-17

## 2025-01-17 RX ORDER — MEMANTINE HYDROCHLORIDE 5 MG/1
5 TABLET ORAL 2 TIMES DAILY
Qty: 60 TABLET | Refills: 3 | Status: SHIPPED | OUTPATIENT
Start: 2025-01-17 | End: 2026-01-17

## 2025-01-17 RX ORDER — FUROSEMIDE 20 MG/1
20 TABLET ORAL DAILY
Qty: 30 TABLET | Refills: 3 | Status: CANCELLED | OUTPATIENT
Start: 2025-01-17 | End: 2026-01-17

## 2025-01-17 RX ORDER — METOPROLOL TARTRATE 25 MG/1
12.5 TABLET, FILM COATED ORAL 2 TIMES DAILY
Qty: 60 TABLET | Refills: 5 | Status: SHIPPED | OUTPATIENT
Start: 2025-01-17 | End: 2026-01-17

## 2025-01-17 RX ORDER — ASPIRIN 81 MG/1
TABLET, CHEWABLE ORAL
Qty: 30 TABLET | Refills: 3 | Status: SHIPPED | OUTPATIENT
Start: 2025-01-17

## 2025-01-17 RX ORDER — FUROSEMIDE 20 MG/1
20 TABLET ORAL DAILY
Qty: 30 TABLET | Refills: 3 | Status: SHIPPED | OUTPATIENT
Start: 2025-01-17 | End: 2025-05-17

## 2025-01-17 SDOH — ECONOMIC STABILITY: TRANSPORTATION INSECURITY
IN THE PAST 12 MONTHS, HAS THE LACK OF TRANSPORTATION KEPT YOU FROM MEDICAL APPOINTMENTS OR FROM GETTING MEDICATIONS?: YES

## 2025-01-17 SDOH — ECONOMIC STABILITY: FOOD INSECURITY: WITHIN THE PAST 12 MONTHS, YOU WORRIED THAT YOUR FOOD WOULD RUN OUT BEFORE YOU GOT MONEY TO BUY MORE.: SOMETIMES TRUE

## 2025-01-17 SDOH — ECONOMIC STABILITY: INCOME INSECURITY: IN THE LAST 12 MONTHS, WAS THERE A TIME WHEN YOU WERE NOT ABLE TO PAY THE MORTGAGE OR RENT ON TIME?: NO

## 2025-01-17 SDOH — ECONOMIC STABILITY: FOOD INSECURITY: WITHIN THE PAST 12 MONTHS, THE FOOD YOU BOUGHT JUST DIDN'T LAST AND YOU DIDN'T HAVE MONEY TO GET MORE.: SOMETIMES TRUE

## 2025-01-17 SDOH — ECONOMIC STABILITY: TRANSPORTATION INSECURITY
IN THE PAST 12 MONTHS, HAS LACK OF TRANSPORTATION KEPT YOU FROM MEETINGS, WORK, OR FROM GETTING THINGS NEEDED FOR DAILY LIVING?: YES

## 2025-01-17 ASSESSMENT — PATIENT HEALTH QUESTIONNAIRE - PHQ9
SUM OF ALL RESPONSES TO PHQ QUESTIONS 1-9: 0
SUM OF ALL RESPONSES TO PHQ9 QUESTIONS 1 & 2: 0
2. FEELING DOWN, DEPRESSED OR HOPELESS: NOT AT ALL
SUM OF ALL RESPONSES TO PHQ QUESTIONS 1-9: 0
SUM OF ALL RESPONSES TO PHQ QUESTIONS 1-9: 0
1. LITTLE INTEREST OR PLEASURE IN DOING THINGS: NOT AT ALL
SUM OF ALL RESPONSES TO PHQ QUESTIONS 1-9: 0

## 2025-01-17 NOTE — PROGRESS NOTES
Future     Standing Expiration Date:   1/17/2026    TSH reflex to FT4     Standing Status:   Future     Standing Expiration Date:   1/17/2026    [facility]     Referral Priority:   Routine     Referral Type:   Home Health Care     Referral Reason:   Specialty Services Required     Referral Location:   [facility]     Requested Specialty:   Home Health Services     Number of Visits Requested:   1    [facility] Referral for Social Determinants of Health (Primary Care Practices)     Referral Priority:   Routine     Referral Type:   Other     Referral Reason:   Specialty Services Required     Requested Specialty:   Community Health Worker     Number of Visits Requested:   1    TX VISIT TO DISCUSS LUNG CA SCREEN W LDCT     Orders Placed This Encounter   Medications    memantine (NAMENDA) 5 MG tablet     Sig: Take 1 tablet by mouth 2 times daily     Dispense:  60 tablet     Refill:  3    metoprolol tartrate (LOPRESSOR) 25 MG tablet     Sig: Take 0.5 tablets by mouth 2 times daily     Dispense:  60 tablet     Refill:  5    atorvastatin (LIPITOR) 40 MG tablet     Sig: Take 1 tablet by mouth at bedtime take 1 tablet by mouth at bedtime     Dispense:  90 tablet     Refill:  1    aspirin (RA ASPIRIN ADULT LOW STRENGTH) 81 MG chewable tablet     Sig: chew and swallow 1 tablet by mouth once daily     Dispense:  30 tablet     Refill:  3    Cyanocobalamin ER (RA VITAMIN B-12 TR) 1000 MCG TBCR     Sig: take 1 tablet by mouth once daily     Dispense:  30 tablet     Refill:  3    furosemide (LASIX) 20 MG tablet     Sig: Take 1 tablet by mouth daily     Dispense:  30 tablet     Refill:  3       The patient (or guardian, if applicable) and other individuals in attendance with the patient were advised that Artificial Intelligence will be utilized during this visit to record, process the conversation to generate a clinical note, and support improvement of the AI technology. The patient (or

## 2025-01-20 ENCOUNTER — TELEPHONE (OUTPATIENT)
Dept: PRIMARY CARE CLINIC | Age: 70
End: 2025-01-20

## 2025-01-20 NOTE — TELEPHONE ENCOUNTER
Ricky Mobley Sr. was contacted by July Quarles MA, a Community Health Navigator, regarding a Social Determinants of Health referral.     A message was left with the writer's contact information.    Follow-up attempt.

## 2025-01-21 ENCOUNTER — TELEPHONE (OUTPATIENT)
Dept: PRIMARY CARE CLINIC | Age: 70
End: 2025-01-21

## 2025-01-21 NOTE — TELEPHONE ENCOUNTER
Home Health called , they reached out to patient to schedule and patient was not aware of referral or what was going on. She said she will call him later to try again but can you reach out to him to clarify the need for home care. Please advise.

## 2025-01-21 NOTE — TELEPHONE ENCOUNTER
The patient is significant dementia.  He was agreeable to services during her visit but likely does not recall the conversation.  Please have them contact his daughter, Liz Anthony at [phone].   His grandson, Liz's son, lives in town and has been assisting with the patient's daily needs

## 2025-01-24 NOTE — TELEPHONE ENCOUNTER
Ricky Mobley Sr. was contacted by July Quarles MA, a Community Health Navigator, regarding a Social Determinants of Health referral.     A message was left with the writer's contact information.    Final follow-up attempt. Writer called patient on 1/21/25 and 1/24/2025

## 2025-01-31 NOTE — TELEPHONE ENCOUNTER
Ricky Mobley Sr. was contacted by July Quarles MA, a Community Health Navigator, regarding a Social Determinants of Health referral.     Pt has not responded to writer's 3 f/u attempts.    Will close referral.

## 2025-04-15 DIAGNOSIS — I10 ESSENTIAL HYPERTENSION: ICD-10-CM

## 2025-04-15 DIAGNOSIS — R76.0 ANTICARDIOLIPIN ANTIBODY POSITIVE: ICD-10-CM

## 2025-04-15 DIAGNOSIS — R41.3 MEMORY LOSS: ICD-10-CM

## 2025-04-15 DIAGNOSIS — E53.8 VITAMIN B12 DEFICIENCY (NON ANEMIC): ICD-10-CM

## 2025-04-15 DIAGNOSIS — I50.22 CHRONIC SYSTOLIC CHF (CONGESTIVE HEART FAILURE) (HCC): ICD-10-CM

## 2025-04-15 RX ORDER — LANOLIN ALCOHOL/MO/W.PET/CERES
1000 CREAM (GRAM) TOPICAL DAILY
Qty: 28 TABLET | Refills: 2 | Status: SHIPPED | OUTPATIENT
Start: 2025-04-15

## 2025-04-15 RX ORDER — MEMANTINE HYDROCHLORIDE 5 MG/1
5 TABLET ORAL 2 TIMES DAILY
Qty: 56 TABLET | Refills: 2 | Status: SHIPPED | OUTPATIENT
Start: 2025-04-15 | End: 2026-04-15

## 2025-04-15 RX ORDER — FUROSEMIDE 20 MG/1
20 TABLET ORAL DAILY
Qty: 28 TABLET | Refills: 2 | Status: SHIPPED | OUTPATIENT
Start: 2025-04-15 | End: 2025-08-13

## 2025-04-15 RX ORDER — ASPIRIN 81 MG
81 TABLET,CHEWABLE ORAL DAILY
Qty: 28 TABLET | Refills: 2 | Status: SHIPPED | OUTPATIENT
Start: 2025-04-15

## 2025-08-07 DIAGNOSIS — I50.22 CHRONIC SYSTOLIC CHF (CONGESTIVE HEART FAILURE) (HCC): ICD-10-CM

## 2025-08-07 DIAGNOSIS — E53.8 VITAMIN B12 DEFICIENCY (NON ANEMIC): ICD-10-CM

## 2025-08-07 DIAGNOSIS — I10 ESSENTIAL HYPERTENSION: ICD-10-CM

## 2025-08-07 DIAGNOSIS — R76.0 ANTICARDIOLIPIN ANTIBODY POSITIVE: ICD-10-CM

## 2025-08-07 DIAGNOSIS — R41.3 MEMORY LOSS: ICD-10-CM

## 2025-08-08 RX ORDER — LANOLIN ALCOHOL/MO/W.PET/CERES
1000 CREAM (GRAM) TOPICAL DAILY
Qty: 28 TABLET | Refills: 1 | Status: SHIPPED | OUTPATIENT
Start: 2025-08-08

## 2025-08-08 RX ORDER — FUROSEMIDE 20 MG/1
20 TABLET ORAL DAILY
Qty: 28 TABLET | Refills: 1 | Status: SHIPPED | OUTPATIENT
Start: 2025-08-08

## 2025-08-08 RX ORDER — ASPIRIN 81 MG
81 TABLET,CHEWABLE ORAL DAILY
Qty: 28 TABLET | Refills: 1 | Status: SHIPPED | OUTPATIENT
Start: 2025-08-08

## 2025-08-08 RX ORDER — MEMANTINE HYDROCHLORIDE 5 MG/1
5 TABLET ORAL 2 TIMES DAILY
Qty: 56 TABLET | Refills: 1 | Status: SHIPPED | OUTPATIENT
Start: 2025-08-08

## (undated) DEVICE — BLOCK BITE 60FR RUBBER ADLT DENTAL

## (undated) DEVICE — NO USE 18 MONTHS GOWN ISOL XL YEL LF